# Patient Record
Sex: FEMALE | Race: WHITE | NOT HISPANIC OR LATINO | Employment: OTHER | URBAN - METROPOLITAN AREA
[De-identification: names, ages, dates, MRNs, and addresses within clinical notes are randomized per-mention and may not be internally consistent; named-entity substitution may affect disease eponyms.]

---

## 2017-01-27 ENCOUNTER — TELEPHONE (OUTPATIENT)
Dept: PULMONOLOGY | Facility: HOSPICE | Age: 77
End: 2017-01-27

## 2017-01-27 NOTE — TELEPHONE ENCOUNTER
Pt dropped off her financial assistance forms for Nyla to fill out. Paperwork is on your desk. Last Seen: 11/3/16,   Next Visit: 03/14/17/amrit

## 2017-02-01 NOTE — TELEPHONE ENCOUNTER
Called pt, left VM that Nyla Bhatt has filled out her Paperwork, but that we are unable to send it to [facility] until we get her signature. I explained I would leave her paperwork at the .

## 2017-02-13 ENCOUNTER — TELEPHONE (OUTPATIENT)
Dept: CARDIOLOGY | Facility: MEDICAL CENTER | Age: 77
End: 2017-02-13

## 2017-02-13 DIAGNOSIS — I48.0 PAROXYSMAL ATRIAL FIBRILLATION (HCC): Chronic | ICD-10-CM

## 2017-02-13 DIAGNOSIS — J44.9 CHRONIC OBSTRUCTIVE PULMONARY DISEASE, UNSPECIFIED COPD TYPE (HCC): ICD-10-CM

## 2017-02-14 NOTE — TELEPHONE ENCOUNTER
Spoke with patient who states she is no longer getting Eliquis through the [employer] [facility] as she has to spend so much prior to qualifying.  Samples given with Good [facility] and discount cards for her to check out her options.  She does not want to go back on Warfarin.

## 2017-02-14 NOTE — TELEPHONE ENCOUNTER
----- Message from Donna Coto sent at 2/13/2017  2:32 PM PST -----  Regarding: Patient wants to get samples of Eliquis  MARIE/Nicole    Patient wants to find out if she can get samples of Eliquis and can be reached at [phone].

## 2017-02-15 DIAGNOSIS — J44.9 CHRONIC OBSTRUCTIVE PULMONARY DISEASE, UNSPECIFIED COPD TYPE (HCC): ICD-10-CM

## 2017-02-15 NOTE — TELEPHONE ENCOUNTER
Pt came in said that she called and left a voicemail stating that she need a hand written RX for spiriva to send with the paperwork [facility]. Wants to know if we can mail her it or if she needs to come down and pick it up. Please call Pt at [phone]

## 2017-02-16 RX ORDER — TIOTROPIUM BROMIDE 18 UG/1
CAPSULE ORAL; RESPIRATORY (INHALATION)
Qty: 90 CAP | Refills: 3 | Status: SHIPPED
Start: 2017-02-16 | End: 2018-01-26 | Stop reason: SDUPTHER

## 2017-02-16 RX ORDER — TIOTROPIUM BROMIDE 18 UG/1
18 CAPSULE ORAL; RESPIRATORY (INHALATION) DAILY
Qty: 90 CAP | Refills: 3 | Status: SHIPPED
Start: 2017-02-16 | End: 2017-03-14 | Stop reason: CLARIF

## 2017-02-16 NOTE — TELEPHONE ENCOUNTER
It can be mailed.   Have we ever prescribed this med? Yes.  If yes, what date? 04/28/16    Last OV: 11/3/16    Next OV: 03/14/17    DX: COPD     Medications:   Requested Prescriptions     Pending Prescriptions Disp Refills   • tiotropium (SPIRIVA HANDIHALER) 18 MCG Cap 90 Cap 3     Sig: Inhale 1 Cap by mouth every day.     PLEASE MAIL TO THE PATIENT/ap

## 2017-03-14 ENCOUNTER — OFFICE VISIT (OUTPATIENT)
Dept: PULMONOLOGY | Facility: HOSPICE | Age: 77
End: 2017-03-14
Payer: MEDICARE

## 2017-03-14 VITALS
HEIGHT: 61 IN | DIASTOLIC BLOOD PRESSURE: 80 MMHG | TEMPERATURE: 98.4 F | RESPIRATION RATE: 16 BRPM | WEIGHT: 134 LBS | OXYGEN SATURATION: 93 % | SYSTOLIC BLOOD PRESSURE: 144 MMHG | BODY MASS INDEX: 25.3 KG/M2 | HEART RATE: 91 BPM

## 2017-03-14 DIAGNOSIS — J44.9 CHRONIC OBSTRUCTIVE PULMONARY DISEASE, UNSPECIFIED COPD TYPE (HCC): Chronic | ICD-10-CM

## 2017-03-14 PROCEDURE — 1101F PT FALLS ASSESS-DOCD LE1/YR: CPT | Mod: 8P | Performed by: NURSE PRACTITIONER

## 2017-03-14 PROCEDURE — G8432 DEP SCR NOT DOC, RNG: HCPCS | Performed by: NURSE PRACTITIONER

## 2017-03-14 PROCEDURE — G8484 FLU IMMUNIZE NO ADMIN: HCPCS | Performed by: NURSE PRACTITIONER

## 2017-03-14 PROCEDURE — 1036F TOBACCO NON-USER: CPT | Performed by: NURSE PRACTITIONER

## 2017-03-14 PROCEDURE — 99213 OFFICE O/P EST LOW 20 MIN: CPT | Performed by: NURSE PRACTITIONER

## 2017-03-14 PROCEDURE — 4040F PNEUMOC VAC/ADMIN/RCVD: CPT | Mod: 8P | Performed by: NURSE PRACTITIONER

## 2017-03-14 PROCEDURE — G8420 CALC BMI NORM PARAMETERS: HCPCS | Performed by: NURSE PRACTITIONER

## 2017-03-14 NOTE — PATIENT INSTRUCTIONS
1. Change to Breo 100/25 µg one inhalation daily. Rinse mouth thoroughly after use.  2. Incruse one inhalation daily.  3. Daily exercise encouraged.  4. Pulmonary function tests at next visit.

## 2017-03-14 NOTE — MR AVS SNAPSHOT
"        Rox José Miguel   3/14/2017 10:40 AM   Office Visit   MRN: [id number]    Department:  [facility]   Dept Phone:  [phone]    Description:  Female : 1940   Provider:  FARHAT Spencer           Reason for Visit     COPD 4 Mth follow Up      Allergies as of 3/14/2017     No Known Allergies      You were diagnosed with     Chronic obstructive pulmonary disease, unspecified COPD type (CMS-HCC)   [[id number]]         Vital Signs     Blood Pressure Pulse Temperature Respirations Height Weight    144/80 mmHg 91 36.9 °C (98.4 °F) 16 1.549 m (5' 0.98\") 60.782 kg (134 lb)    Body Mass Index Oxygen Saturation Smoking Status             25.33 kg/m2 93% Former Smoker         Basic Information     Date Of Birth Sex Race Ethnicity Preferred Language    1940 Female White Non- English      Problem List              ICD-10-CM Priority Class Noted - Resolved    Atrial fibrillation (CMS-HCC) (Chronic) I48.91   Unknown - Present    Long term current use of anticoagulant therapy (Chronic) Z79.01   Unknown - Present    COPD (chronic obstructive pulmonary disease) (CMS-HCC) (Chronic) J44.9   Unknown - Present    Cor pulmonale (CMS-HCC) (Chronic) I27.81   Unknown - Present    HTN (hypertension) (Chronic) I10   Unknown - Present    Menopausal and postmenopausal disorder (Chronic) N95.9   Unknown - Present    PVC (premature ventricular contraction) (Chronic) I49.3   Unknown - Present    Hypoxemia R09.02   2016 - Present      Health Maintenance        Date Due Completion Dates    IMM DTaP/Tdap/Td Vaccine (1 - Tdap) 1958 ---    PAP SMEAR 1960 ---    MAMMOGRAM 1979 ---    COLONOSCOPY 1989 ---    IMM ZOSTER VACCINE 1999 ---    BONE DENSITY 2004 ---    IMM PNEUMOCOCCAL 65+ (ADULT) LOW/MEDIUM RISK SERIES (1 of 2 - PCV13) 2004 ---    IMM INFLUENZA (1) 2016 ---            Current Immunizations     No immunizations on file.      Below and/or attached are the " medications your provider expects you to take. Review all of your home medications and newly ordered medications with your provider and/or pharmacist. Follow medication instructions as directed by your provider and/or pharmacist. Please keep your medication list with you and share with your provider. Update the information when medications are discontinued, doses are changed, or new medications (including over-the-counter products) are added; and carry medication information at all times in the event of emergency situations     Allergies:  No Known Allergies          Medications  Valid as of: March 14, 2017 - 11:11 AM    Generic Name Brand Name Tablet Size Instructions for use    Acetaminophen (Tab) TYLENOL 325 MG Take 650 mg by mouth every four hours as needed.        Albuterol Sulfate (Aero Soln) albuterol 108 (90 BASE) MCG/ACT Inhale 2 Puffs by mouth every four hours as needed for Shortness of Breath.        Apixaban (Tab) ELIQUIS 5mg Take 1 Tab by mouth 2 Times a Day.        Ascorbic Acid   Take 1 Tab by mouth every day.          Atenolol (Tab) TENORMIN 50 MG Take 50 mg by mouth every day.        Fluticasone Furoate-Vilanterol (AEROSOL POWDER, BREATH ACTIVATED) Fluticasone Furoate-Vilanterol 100-25 MCG/INH Take 1 Inhalation by mouth every day. Rinse mouth after use.        HydroCHLOROthiazide (Tab) HYDRODIURIL 12.5 MG Take 12.5 mg by mouth every day.        Losartan Potassium (Tab) COZAAR 25 MG Take 25 mg by mouth every day.        Omega-3 Fatty Acids   Take 1 Cap by mouth every day. Pt. Taking 2 times a week        Potassium Chloride (Tab CR) KLOR-CON 10 MEQ Take 10 mEq by mouth every day.          Prenatal Vit-Fe Fumarate-FA   Take  by mouth.        Tiotropium Bromide Monohydrate (Cap) SPIRIVA 18 MCG Use 1 capsule inhaled from handihaler every day.        Umeclidinium Bromide (AEROSOL POWDER, BREATH ACTIVATED) Umeclidinium Bromide 62.5 MCG/INH Inhale 1 Puff by mouth every day.        .                    Medicines prescribed today were sent to:     [id number] - [place] - [address] [place] [address]    Phone: [phone] Fax: [phone]    Open 24 Hours?: No      Medication refill instructions:       If your prescription bottle indicates you have medication refills left, it is not necessary to call your provider’s office. Please contact your pharmacy and they will refill your medication.    If your prescription bottle indicates you do not have any refills left, you may request refills at any time through one of the following ways: The online [employer] system (except Urgent Care), by calling your provider’s office, or by asking your pharmacy to contact your provider’s office with a refill request. Medication refills are processed only during regular business hours and may not be available until the next business day. Your provider may request additional information or to have a follow-up visit with you prior to refilling your medication.   *Please Note: Medication refills are assigned a new Rx number when refilled electronically. Your pharmacy may indicate that no refills were authorized even though a new prescription for the same medication is available at the pharmacy. Please request the medicine by name with the pharmacy before contacting your provider for a refill.        Your To Do List     Future Labs/Procedures Complete By Expires    AMB PULMONARY FUNCTION TEST/LAB  As directed 3/14/2018      Instructions    1. Change to Breo 100/25 µg one inhalation daily. Rinse mouth thoroughly after use.  2. Incruse one inhalation daily.  3. Daily exercise encouraged.  4. Pulmonary function tests at next visit.               [employer] Access Code: [email or web address]  Expires: 4/13/2017 11:11 AM    [employer]  A secure, online tool to manage your health information     [employer]’s [employer]® is a secure, online tool that connects you to your personalized health information from the privacy of your  home -- day or night - making it very easy for you to manage your healthcare. Once the activation process is completed, you can even access your medical information using the [employer] pascale, which is available for free in the Apple Pascale store or Google Play store.     [employer] provides the following levels of access (as shown below):   My Chart Features   [facility] Doctor [facility]  Specialists [facility]  Urgent  Care Non-[facility]  Primary Care  Doctor   Email your healthcare team securely and privately 24/7 X X X    Manage appointments: schedule your next appointment; view details of past/upcoming appointments X      Request prescription refills. X      View recent personal medical records, including lab and immunizations X X X X   View health record, including health history, allergies, medications X X X X   Read reports about your outpatient visits, procedures, consult and ER notes X X X X   See your discharge summary, which is a recap of your hospital and/or ER visit that includes your diagnosis, lab results, and care plan. X X       How to register for [employer]:  1. Go to  [email or web address].  2. Click on the Sign Up Now box, which takes you to the New Member Sign Up page. You will need to provide the following information:  a. Enter your [employer] Access Code exactly as it appears at the top of this page. (You will not need to use this code after you’ve completed the sign-up process. If you do not sign up before the expiration date, you must request a new code.)   b. Enter your date of birth.   c. Enter your home email address.   d. Click Submit, and follow the next screen’s instructions.  3. Create a [employer] ID. This will be your [employer] login ID and cannot be changed, so think of one that is secure and easy to remember.  4. Create a [employer] password. You can change your password at any time.  5. Enter your Password Reset Question and Answer. This can be used at a later time if you forget your password.    6. Enter your e-mail address. This allows you to receive e-mail notifications when new information is available in [employer].  7. Click Sign Up. You can now view your health information.    For assistance activating your [employer] account, call [phone]

## 2017-03-14 NOTE — PROGRESS NOTES
Chief Complaint   Patient presents with   • COPD     4 Mth follow Up         HPI:  This is a 76 y.o. female with a history of chronic obstructive pulmonary disease. Pulmonary function tests from April 2016 indicated FEV1 1.11 L, 62% predicted, FEV1/FVC 64%, and DLCO 111% predicted. She is compliant with Symbicort 160/4.5 µg 2 puffs twice daily and Spiriva daily. The patient reports doing quite well. She has no fevers, chills, sweats, weight loss, or hemoptysis. She has shortness of breath with climbing hills and when bending over. Patient reports that her formulary has changed and Symbicort and Spiriva are going to be $800 a month in total. We will change to Breo and Incruse.    Past Medical History   Diagnosis Date   • Atrial fibrillation (CMS-HCC)    • Long term (current) use of anticoagulants    • COPD (chronic obstructive pulmonary disease) (CMS-HCC)    • Cor pulmonale (CMS-HCC)    • HTN (hypertension)    • Unspecified menopausal and postmenopausal disorder    • PVC (premature ventricular contraction)        Past Surgical History   Procedure Laterality Date   • Cataract extraction with iol     • Cholecystectomy       LAPAROSCOPIC.   • Knee arthroscopy     • Retinal detachment repair         Social History   Substance Use Topics   • Smoking status: Former Smoker -- 1.00 packs/day for 28 years     Types: Cigarettes     Start date: 01/01/1953     Quit date: 01/01/1981   • Smokeless tobacco: Never Used   • Alcohol Use: 3.0 - 6.0 oz/week     5-10 Standard drinks or equivalent per week      Comment: WINE       ROS:   Constitutional: Denies fevers, chills, sweats, fatigue, and weight loss.  Eyes: Denies glasses.  Ears/nose/mouth/throat: Denies injury.  Cardiovascular: Denies chest pain, tightness.  Respiratory: See history of present illness.  GI: Denies heartburn, difficulty swallowing, nausea, and vomiting.  Neurological: Denies frequent headaches, dizziness, weakness.    Vitals:  Filed Vitals:    03/14/17 1008  "  Height: 1.549 m (5' 0.98\")   Weight: 60.782 kg (134 lb)   Weight % change since last entry.: 0 %   BP: 144/80   Pulse: 91   BMI (Calculated): 25.33   Resp: 16   Temp: 36.9 °C (98.4 °F)   O2 sat % room air: 93 %       Allergies:  Review of patient's allergies indicates no known allergies.    Medications:  Current Outpatient Prescriptions   Medication Sig Dispense Refill   • Fluticasone Furoate-Vilanterol (BREO ELLIPTA) 100-25 MCG/INH AEROSOL POWDER, BREATH ACTIVATED Take 1 Inhalation by mouth every day. Rinse mouth after use. 1 Each 5   • Umeclidinium Bromide (INCRUSE ELLIPTA) 62.5 MCG/INH AEROSOL POWDER, BREATH ACTIVATED Inhale 1 Puff by mouth every day. 1 Each 5   • tiotropium (SPIRIVA HANDIHALER) 18 MCG Cap Use 1 capsule inhaled from handihaler every day. 90 Cap 3   • apixaban (ELIQUIS) 5mg Tab Take 1 Tab by mouth 2 Times a Day. 84 Tab 0   • albuterol (VENTOLIN OR PROVENTIL) 108 (90 BASE) MCG/ACT Aero Soln inhalation aerosol Inhale 2 Puffs by mouth every four hours as needed for Shortness of Breath.     • hydrochlorothiazide (HYDRODIURIL) 12.5 MG tablet Take 12.5 mg by mouth every day.     • losartan (COZAAR) 25 MG Tab Take 25 mg by mouth every day.     • atenolol (TENORMIN) 50 MG Tab Take 50 mg by mouth every day.     • acetaminophen (TYLENOL) 325 MG TABS Take 650 mg by mouth every four hours as needed.     • Prenatal Vit-Fe Fumarate-FA (PRENATAL ONE DAILY PO) Take  by mouth.     • Omega-3 Fatty Acids (FISH OIL PO) Take 1 Cap by mouth every day. Pt. Taking 2 times a week     • potassium chloride CR (K-DUR) 10 MEQ tablet Take 10 mEq by mouth every day.       • Ascorbic Acid (VITAMIN C PO) Take 1 Tab by mouth every day.         No current facility-administered medications for this visit.       PHYSICAL EXAM:  Appearance: Well-developed, well-nourished, no acute distress.  Eyes. PERRL.  Hearing: Grossly intact.  Oropharynx: Tongue normal, posterior pharynx without erythema or exudate.  Respiratory effort: No " intercostal retractions or use of accessory muscles.  Lung auscultation: No crackles, wheezing.  Heart auscultation: No murmur, gallop, or rub. Regular rate and rhythm.  Extremities: No cyanosis or edema.  Gait and Station: Normal  Orientation: Oriented to time, place, and person.    Assessment:  1. Chronic obstructive pulmonary disease, unspecified COPD type (CMS-HCC)  AMB PULMONARY FUNCTION TEST/LAB    Fluticasone Furoate-Vilanterol (BREO ELLIPTA) 100-25 MCG/INH AEROSOL POWDER, BREATH ACTIVATED    Umeclidinium Bromide (INCRUSE ELLIPTA) 62.5 MCG/INH AEROSOL POWDER, BREATH ACTIVATED         Plan:  1. Change to Breo 100/25 µg one inhalation daily. Rinse mouth thoroughly after use.  2. Incruse one inhalation daily.  3. Daily exercise encouraged.  4. Pulmonary function tests at next visit.     Return in about 3 months (around 6/14/2017) for With results, With AWA Aguilar.

## 2017-04-25 ENCOUNTER — TELEPHONE (OUTPATIENT)
Dept: VASCULAR LAB | Facility: MEDICAL CENTER | Age: 77
End: 2017-04-25

## 2017-04-25 ENCOUNTER — TELEPHONE (OUTPATIENT)
Dept: CARDIOLOGY | Facility: MEDICAL CENTER | Age: 77
End: 2017-04-25

## 2017-04-25 DIAGNOSIS — I48.0 PAROXYSMAL ATRIAL FIBRILLATION (HCC): Chronic | ICD-10-CM

## 2017-04-25 DIAGNOSIS — I48.91 ATRIAL FIBRILLATION, UNSPECIFIED TYPE (HCC): ICD-10-CM

## 2017-04-25 NOTE — TELEPHONE ENCOUNTER
"----- Message from Nicole Pritchett L.P.N. sent at 4/25/2017  4:36 PM PDT -----  Regarding: FW: eliquis samples for   Contact: [phone]  Spoke with patient who still has not decided what she is going to do in regards to Eliquis.  She will again check with her Insurance regarding the cost.  She does not want to \"give up\" on Eliquis.    ----- Message -----     From: Nicole Pritchett L.P.N.     Sent: 4/25/2017   2:59 PM       To: Nicole Pritchett L.P.N.  Subject: FW: eliquis samples for                   Pt left message to call.   ----- Message -----     From: Brooklyn Gallegos     Sent: 4/25/2017  12:33 PM       To: Nicole Pritchett L.P.N.  Subject: eliquis samples for                           MARIE/nicole  Pt calling about eliquis samples you were going to get for her. Please call pt to arrange for her to , [phone].      "

## 2017-05-11 ENCOUNTER — HOSPITAL ENCOUNTER (OUTPATIENT)
Dept: LAB | Facility: MEDICAL CENTER | Age: 77
End: 2017-05-11
Attending: NURSE PRACTITIONER
Payer: MEDICARE

## 2017-05-11 DIAGNOSIS — I48.91 ATRIAL FIBRILLATION, UNSPECIFIED TYPE (HCC): ICD-10-CM

## 2017-05-11 LAB
ANION GAP SERPL CALC-SCNC: 9 MMOL/L (ref 0–11.9)
BUN SERPL-MCNC: 8 MG/DL (ref 8–22)
CALCIUM SERPL-MCNC: 9.5 MG/DL (ref 8.5–10.5)
CHLORIDE SERPL-SCNC: 96 MMOL/L (ref 96–112)
CO2 SERPL-SCNC: 32 MMOL/L (ref 20–33)
CREAT SERPL-MCNC: 0.9 MG/DL (ref 0.5–1.4)
ERYTHROCYTE [DISTWIDTH] IN BLOOD BY AUTOMATED COUNT: 49.1 FL (ref 35.9–50)
GFR SERPL CREATININE-BSD FRML MDRD: >60 ML/MIN/1.73 M 2
GLUCOSE SERPL-MCNC: 119 MG/DL (ref 65–99)
HCT VFR BLD AUTO: 44.2 % (ref 37–47)
HGB BLD-MCNC: 14.7 G/DL (ref 12–16)
MCH RBC QN AUTO: 34.1 PG (ref 27–33)
MCHC RBC AUTO-ENTMCNC: 33.3 G/DL (ref 33.6–35)
MCV RBC AUTO: 102.6 FL (ref 81.4–97.8)
PLATELET # BLD AUTO: 127 K/UL (ref 164–446)
PMV BLD AUTO: 10.6 FL (ref 9–12.9)
POTASSIUM SERPL-SCNC: 3.2 MMOL/L (ref 3.6–5.5)
RBC # BLD AUTO: 4.31 M/UL (ref 4.2–5.4)
SODIUM SERPL-SCNC: 137 MMOL/L (ref 135–145)
WBC # BLD AUTO: 3.8 K/UL (ref 4.8–10.8)

## 2017-05-11 PROCEDURE — 80048 BASIC METABOLIC PNL TOTAL CA: CPT

## 2017-05-11 PROCEDURE — 36415 COLL VENOUS BLD VENIPUNCTURE: CPT

## 2017-05-11 PROCEDURE — 85027 COMPLETE CBC AUTOMATED: CPT

## 2017-06-30 ENCOUNTER — TELEPHONE (OUTPATIENT)
Dept: CARDIOLOGY | Facility: MEDICAL CENTER | Age: 77
End: 2017-06-30

## 2017-06-30 NOTE — TELEPHONE ENCOUNTER
returning your call  Received: Today       JOHN Fortune       Patient is returning your call. She can be reached at [phone].              Returned patient call. Pt states she has company at this time and is leaving town next week and doesn't want to run out. We discuss patient assistance and the company co-pay card. Will place on in mail for patient today and patient will attempt to fill at pharmacy next week and call if she has any troubles.

## 2017-06-30 NOTE — TELEPHONE ENCOUNTER
out of Eliquis samples  Received: Today       JOHN Fortune/Lin       Patient is out of her Eliquis samples. She would like a call back at [phone].         Returned patient call. No answer, LVM with request for call back

## 2017-07-26 DIAGNOSIS — J44.9 CHRONIC OBSTRUCTIVE PULMONARY DISEASE, UNSPECIFIED COPD TYPE (HCC): ICD-10-CM

## 2017-07-26 RX ORDER — BUDESONIDE AND FORMOTEROL FUMARATE DIHYDRATE 160; 4.5 UG/1; UG/1
2 AEROSOL RESPIRATORY (INHALATION) 2 TIMES DAILY
Qty: 1 INHALER | Refills: 11 | Status: SHIPPED
Start: 2017-07-26 | End: 2017-08-08 | Stop reason: SDUPTHER

## 2017-07-26 RX ORDER — BUDESONIDE AND FORMOTEROL FUMARATE DIHYDRATE 160; 4.5 UG/1; UG/1
2 AEROSOL RESPIRATORY (INHALATION) 2 TIMES DAILY
COMMUNITY
End: 2017-07-26 | Stop reason: SDUPTHER

## 2017-07-26 NOTE — TELEPHONE ENCOUNTER
Pt called requesting an rx for Symbicort 160, states that she has a free 1 yr Symbicort card that she would like to use, states that she would prefer to hand carry the to a pharmacy just incase they don't honor the free 1 yr card. Pt also states that her  just got out of hospital and is unable to  the rx from office.    Last OV: 3/14/17  Next OV: 3 month return- transferred pt to scheduling to make pft appt and next ov.  COPD

## 2017-07-28 ENCOUNTER — HOSPITAL ENCOUNTER (OUTPATIENT)
Dept: LAB | Facility: MEDICAL CENTER | Age: 77
End: 2017-07-28
Attending: FAMILY MEDICINE
Payer: MEDICARE

## 2017-07-28 LAB
ALBUMIN SERPL BCP-MCNC: 3.5 G/DL (ref 3.2–4.9)
ALBUMIN/GLOB SERPL: 1 G/DL
ALP SERPL-CCNC: 88 U/L (ref 30–99)
ALT SERPL-CCNC: 25 U/L (ref 2–50)
ANION GAP SERPL CALC-SCNC: 14 MMOL/L (ref 0–11.9)
AST SERPL-CCNC: 58 U/L (ref 12–45)
BASOPHILS # BLD AUTO: 0.5 % (ref 0–1.8)
BASOPHILS # BLD: 0.03 K/UL (ref 0–0.12)
BILIRUB CONJ SERPL-MCNC: 0.5 MG/DL (ref 0.1–0.5)
BILIRUB INDIRECT SERPL-MCNC: 1.7 MG/DL (ref 0–1)
BILIRUB SERPL-MCNC: 2.2 MG/DL (ref 0.1–1.5)
BUN SERPL-MCNC: 7 MG/DL (ref 8–22)
CALCIUM SERPL-MCNC: 9.6 MG/DL (ref 8.5–10.5)
CHLORIDE SERPL-SCNC: 88 MMOL/L (ref 96–112)
CHOLEST SERPL-MCNC: 189 MG/DL (ref 100–199)
CO2 SERPL-SCNC: 31 MMOL/L (ref 20–33)
CREAT SERPL-MCNC: 0.64 MG/DL (ref 0.5–1.4)
EOSINOPHIL # BLD AUTO: 0.02 K/UL (ref 0–0.51)
EOSINOPHIL NFR BLD: 0.3 % (ref 0–6.9)
ERYTHROCYTE [DISTWIDTH] IN BLOOD BY AUTOMATED COUNT: 56.8 FL (ref 35.9–50)
EST. AVERAGE GLUCOSE BLD GHB EST-MCNC: 97 MG/DL
FERRITIN SERPL-MCNC: 142.4 NG/ML (ref 10–291)
FOLATE SERPL-MCNC: 4.5 NG/ML
GFR SERPL CREATININE-BSD FRML MDRD: >60 ML/MIN/1.73 M 2
GLOBULIN SER CALC-MCNC: 3.5 G/DL (ref 1.9–3.5)
GLUCOSE SERPL-MCNC: 84 MG/DL (ref 65–99)
HBA1C MFR BLD: 5 % (ref 0–5.6)
HCT VFR BLD AUTO: 41.6 % (ref 37–47)
HDLC SERPL-MCNC: 67 MG/DL
HGB BLD-MCNC: 14.2 G/DL (ref 12–16)
IMM GRANULOCYTES # BLD AUTO: 0.02 K/UL (ref 0–0.11)
IMM GRANULOCYTES NFR BLD AUTO: 0.3 % (ref 0–0.9)
IRON SATN MFR SERPL: 30 % (ref 15–55)
IRON SERPL-MCNC: 93 UG/DL (ref 40–170)
LDLC SERPL CALC-MCNC: 107 MG/DL
LYMPHOCYTES # BLD AUTO: 0.76 K/UL (ref 1–4.8)
LYMPHOCYTES NFR BLD: 13.1 % (ref 22–41)
MAGNESIUM SERPL-MCNC: 1.1 MG/DL (ref 1.5–2.5)
MCH RBC QN AUTO: 34.3 PG (ref 27–33)
MCHC RBC AUTO-ENTMCNC: 34.1 G/DL (ref 33.6–35)
MCV RBC AUTO: 100.5 FL (ref 81.4–97.8)
MONOCYTES # BLD AUTO: 0.53 K/UL (ref 0–0.85)
MONOCYTES NFR BLD AUTO: 9.1 % (ref 0–13.4)
NEUTROPHILS # BLD AUTO: 4.46 K/UL (ref 2–7.15)
NEUTROPHILS NFR BLD: 76.7 % (ref 44–72)
NRBC # BLD AUTO: 0 K/UL
NRBC BLD AUTO-RTO: 0 /100 WBC
PLATELET # BLD AUTO: 143 K/UL (ref 164–446)
PMV BLD AUTO: 11.3 FL (ref 9–12.9)
POTASSIUM SERPL-SCNC: 2.8 MMOL/L (ref 3.6–5.5)
PROT SERPL-MCNC: 7 G/DL (ref 6–8.2)
RBC # BLD AUTO: 4.14 M/UL (ref 4.2–5.4)
SODIUM SERPL-SCNC: 133 MMOL/L (ref 135–145)
TIBC SERPL-MCNC: 307 UG/DL (ref 250–450)
TRIGL SERPL-MCNC: 75 MG/DL (ref 0–149)
VIT B12 SERPL-MCNC: 733 PG/ML (ref 211–911)
WBC # BLD AUTO: 5.8 K/UL (ref 4.8–10.8)

## 2017-07-28 PROCEDURE — 80061 LIPID PANEL: CPT

## 2017-07-28 PROCEDURE — 83540 ASSAY OF IRON: CPT

## 2017-07-28 PROCEDURE — 36415 COLL VENOUS BLD VENIPUNCTURE: CPT

## 2017-07-28 PROCEDURE — 85025 COMPLETE CBC W/AUTO DIFF WBC: CPT

## 2017-07-28 PROCEDURE — 82248 BILIRUBIN DIRECT: CPT

## 2017-07-28 PROCEDURE — 82728 ASSAY OF FERRITIN: CPT

## 2017-07-28 PROCEDURE — 83550 IRON BINDING TEST: CPT

## 2017-07-28 PROCEDURE — 80053 COMPREHEN METABOLIC PANEL: CPT

## 2017-07-28 PROCEDURE — 83735 ASSAY OF MAGNESIUM: CPT

## 2017-07-28 PROCEDURE — 82746 ASSAY OF FOLIC ACID SERUM: CPT

## 2017-07-28 PROCEDURE — 83036 HEMOGLOBIN GLYCOSYLATED A1C: CPT

## 2017-07-28 PROCEDURE — 82607 VITAMIN B-12: CPT

## 2017-08-01 ENCOUNTER — HOSPITAL ENCOUNTER (EMERGENCY)
Facility: MEDICAL CENTER | Age: 77
End: 2017-08-01
Attending: EMERGENCY MEDICINE
Payer: MEDICARE

## 2017-08-01 VITALS
SYSTOLIC BLOOD PRESSURE: 159 MMHG | DIASTOLIC BLOOD PRESSURE: 96 MMHG | WEIGHT: 131.39 LBS | HEART RATE: 89 BPM | RESPIRATION RATE: 16 BRPM | OXYGEN SATURATION: 90 % | HEIGHT: 62 IN | TEMPERATURE: 97.6 F | BODY MASS INDEX: 24.18 KG/M2

## 2017-08-01 DIAGNOSIS — E87.1 HYPONATREMIA: ICD-10-CM

## 2017-08-01 DIAGNOSIS — E87.6 HYPOKALEMIA: ICD-10-CM

## 2017-08-01 LAB
ALBUMIN SERPL BCP-MCNC: 3.1 G/DL (ref 3.2–4.9)
ALBUMIN/GLOB SERPL: 1.1 G/DL
ALP SERPL-CCNC: 73 U/L (ref 30–99)
ALT SERPL-CCNC: 24 U/L (ref 2–50)
ANION GAP SERPL CALC-SCNC: 10 MMOL/L (ref 0–11.9)
AST SERPL-CCNC: 56 U/L (ref 12–45)
BASOPHILS # BLD AUTO: 0.8 % (ref 0–1.8)
BASOPHILS # BLD: 0.03 K/UL (ref 0–0.12)
BILIRUB SERPL-MCNC: 2.7 MG/DL (ref 0.1–1.5)
BUN SERPL-MCNC: 12 MG/DL (ref 8–22)
CALCIUM SERPL-MCNC: 9.3 MG/DL (ref 8.5–10.5)
CHLORIDE SERPL-SCNC: 92 MMOL/L (ref 96–112)
CO2 SERPL-SCNC: 31 MMOL/L (ref 20–33)
CREAT SERPL-MCNC: 0.83 MG/DL (ref 0.5–1.4)
EOSINOPHIL # BLD AUTO: 0.06 K/UL (ref 0–0.51)
EOSINOPHIL NFR BLD: 1.6 % (ref 0–6.9)
ERYTHROCYTE [DISTWIDTH] IN BLOOD BY AUTOMATED COUNT: 57.3 FL (ref 35.9–50)
GFR SERPL CREATININE-BSD FRML MDRD: >60 ML/MIN/1.73 M 2
GLOBULIN SER CALC-MCNC: 2.9 G/DL (ref 1.9–3.5)
GLUCOSE SERPL-MCNC: 95 MG/DL (ref 65–99)
HCT VFR BLD AUTO: 41.1 % (ref 37–47)
HGB BLD-MCNC: 14.4 G/DL (ref 12–16)
IMM GRANULOCYTES # BLD AUTO: 0.04 K/UL (ref 0–0.11)
IMM GRANULOCYTES NFR BLD AUTO: 1 % (ref 0–0.9)
INR PPP: 2.02 (ref 0.87–1.13)
LIPASE SERPL-CCNC: 29 U/L (ref 11–82)
LYMPHOCYTES # BLD AUTO: 0.66 K/UL (ref 1–4.8)
LYMPHOCYTES NFR BLD: 17.1 % (ref 22–41)
MCH RBC QN AUTO: 35.3 PG (ref 27–33)
MCHC RBC AUTO-ENTMCNC: 35 G/DL (ref 33.6–35)
MCV RBC AUTO: 100.7 FL (ref 81.4–97.8)
MONOCYTES # BLD AUTO: 0.75 K/UL (ref 0–0.85)
MONOCYTES NFR BLD AUTO: 19.4 % (ref 0–13.4)
NEUTROPHILS # BLD AUTO: 2.32 K/UL (ref 2–7.15)
NEUTROPHILS NFR BLD: 60.1 % (ref 44–72)
NRBC # BLD AUTO: 0 K/UL
NRBC BLD AUTO-RTO: 0 /100 WBC
PLATELET # BLD AUTO: 105 K/UL (ref 164–446)
PMV BLD AUTO: 9.8 FL (ref 9–12.9)
POTASSIUM SERPL-SCNC: 2.8 MMOL/L (ref 3.6–5.5)
PROT SERPL-MCNC: 6 G/DL (ref 6–8.2)
PROTHROMBIN TIME: 23.5 SEC (ref 12–14.6)
RBC # BLD AUTO: 4.08 M/UL (ref 4.2–5.4)
SODIUM SERPL-SCNC: 133 MMOL/L (ref 135–145)
WBC # BLD AUTO: 3.9 K/UL (ref 4.8–10.8)

## 2017-08-01 PROCEDURE — 700105 HCHG RX REV CODE 258: Performed by: EMERGENCY MEDICINE

## 2017-08-01 PROCEDURE — 99284 EMERGENCY DEPT VISIT MOD MDM: CPT

## 2017-08-01 PROCEDURE — 85025 COMPLETE CBC W/AUTO DIFF WBC: CPT

## 2017-08-01 PROCEDURE — 83690 ASSAY OF LIPASE: CPT

## 2017-08-01 PROCEDURE — 700102 HCHG RX REV CODE 250 W/ 637 OVERRIDE(OP): Performed by: EMERGENCY MEDICINE

## 2017-08-01 PROCEDURE — A9270 NON-COVERED ITEM OR SERVICE: HCPCS | Performed by: EMERGENCY MEDICINE

## 2017-08-01 PROCEDURE — 85610 PROTHROMBIN TIME: CPT

## 2017-08-01 PROCEDURE — 80053 COMPREHEN METABOLIC PANEL: CPT

## 2017-08-01 RX ORDER — POTASSIUM CHLORIDE 20 MEQ/1
20 TABLET, EXTENDED RELEASE ORAL 2 TIMES DAILY
Qty: 30 TAB | Refills: 0 | Status: SHIPPED | OUTPATIENT
Start: 2017-08-01 | End: 2017-09-11

## 2017-08-01 RX ORDER — SODIUM CHLORIDE 9 MG/ML
INJECTION, SOLUTION INTRAVENOUS CONTINUOUS
Status: DISCONTINUED | OUTPATIENT
Start: 2017-08-01 | End: 2017-08-01 | Stop reason: HOSPADM

## 2017-08-01 RX ORDER — POTASSIUM CHLORIDE 20 MEQ/1
20 TABLET, EXTENDED RELEASE ORAL 2 TIMES DAILY
Qty: 30 TAB | Refills: 0 | Status: SHIPPED | OUTPATIENT
Start: 2017-08-01 | End: 2017-08-01

## 2017-08-01 RX ORDER — POTASSIUM CHLORIDE 20 MEQ/1
40 TABLET, EXTENDED RELEASE ORAL ONCE
Status: COMPLETED | OUTPATIENT
Start: 2017-08-01 | End: 2017-08-01

## 2017-08-01 RX ADMIN — POTASSIUM CHLORIDE 40 MEQ: 1500 TABLET, EXTENDED RELEASE ORAL at 13:43

## 2017-08-01 RX ADMIN — POTASSIUM CHLORIDE 40 MEQ: 1500 TABLET, EXTENDED RELEASE ORAL at 11:33

## 2017-08-01 RX ADMIN — SODIUM CHLORIDE: 9 INJECTION, SOLUTION INTRAVENOUS at 11:42

## 2017-08-01 ASSESSMENT — PAIN SCALES - GENERAL
PAINLEVEL_OUTOF10: 0

## 2017-08-01 NOTE — ED PROVIDER NOTES
ED Provider Note    CHIEF COMPLAINT  Chief Complaint   Patient presents with   • Abnormal Labs       HPI  Rox Valdez is a 76 y.o. female who presents from home for evaluation of a low potassium and low sodium. That she feels fine. She is asymptomatic. No dizziness no weakness. No vomiting or diarrhea. No headache, no chest pain, no difficulty breathing. No paresthesias. Says she feels great that she saw her family physician 3 days ago they did some lab on her and she was told to come into the ED. Her medications include Symbicort, Spiriva, hydrochlorothiazide, Cozaar, Tenormin.    REVIEW OF SYSTEMS  No headache, no jaw pain, no chest pain, no difficulty breathing.  ALL OTHER SYSTEMS NEGATIVE    ALLERGIES  No Known Allergies    CURRENT MEDICATIONS  Symbicort, Spiriva, Ventolin, Cozaar, atenolol, eliquis, hydrochlorothiazide.    PAST MEDICAL HISTORY  Past Medical History   Diagnosis Date   • Atrial fibrillation (CMS-HCC)    • Long term (current) use of anticoagulants    • COPD (chronic obstructive pulmonary disease) (CMS-HCC)    • Cor pulmonale (CMS-HCC)    • HTN (hypertension)    • Unspecified menopausal and postmenopausal disorder    • PVC (premature ventricular contraction)        SURGICAL HISTORY  Past Surgical History   Procedure Laterality Date   • Cataract extraction with iol     • Cholecystectomy       LAPAROSCOPIC.   • Knee arthroscopy     • Retinal detachment repair         FAMILY HISTORY  Family History   Problem Relation Age of Onset   • Hypertension Mother    • Heart Disease Brother      Atrial Fibrillation.       SOCIAL HISTORY  Social History     Social History   • Marital Status:      Spouse Name: N/A   • Number of Children: N/A   • Years of Education: N/A     Social History Main Topics   • Smoking status: Former Smoker -- 1.00 packs/day for 28 years     Types: Cigarettes     Start date: 01/01/1953     Quit date: 01/01/1981   • Smokeless tobacco: Never Used   • Alcohol Use: 3.0 - 6.0 oz/week  "    5-10 Standard drinks or equivalent per week      Comment: WINE   • Drug Use: Not on file   • Sexual Activity: Not on file     Other Topics Concern   • Not on file     Social History Narrative       PHYSICAL EXAM  GENERAL: Alert female adult  VITAL SIGNS: /96 mmHg  Pulse 85  Temp(Src) 36.4 °C (97.6 °F)  Resp 16  Ht 1.575 m (5' 2\")  Wt 59.6 kg (131 lb 6.3 oz)  BMI 24.03 kg/m2  SpO2 92%  Constitutional: Alert healthy-appearing adult   HENT: Scalp is normal size and nontender. Ears are clear. Nose is clear. Throat is clear with no stridor no drooling no trismus. Teeth are all intact.  Eyes: Pupils equal round and reactive to light, extraocular motor fall. There is no scleral icterus.  Neck: Neck is supple and nontender. There is no meningismus. No adenitis. No thyromegaly.  Lymphatic: No adenopathy.   Cardiovascular: Heart regular rhythm without murmurs or gallops   Thorax & Lungs: No chest wall tenderness. Lungs are clear. Patient has good breath sounds bilateral. No rales, no rhonchi, no wheezes.  Abdomen: Abdomen is soft, nontender, not rigid, no guarding, and no organomegaly. There is no palpable hernia   Skin: Warm, pink, and dry with no erythema and no rash.   Back: Nontender, no midline bony tenderness, no flank tenderness.                                              Extremities: Full range of motion  No tenderness to palpation and no deformities noted. No calf or thigh swelling. No calf or thigh tenderness. No clinical DVT.  Neurologic: Alert & oriented . Cranial nerves are grossly intact as tested. Patient moves all 4 extremities well. Patient has good strong flexion and extension of the ankle joints knee joints hip joints and elbow joints. Sensation is normal and symmetrical in the upper and lower extremities.   Psychiatric: Patient is alert oriented coherent and rational.       COURSE & MEDICAL DECISION MAKING  Patient was sent in by her family physician because she has a low serum sodium " and potassium that is picked up on lab a couple days ago. She is otherwise asymptomatic. No weakness, no paresthesias, no vomiting or diarrhea. She is asymptomatic. Hypokalemia may be related to her hydrochlorothiazide without potassium replacement.    Plan: #1. IV normal saline. Number 2K Dur 40 mEq by mouth #3 for repeat lab. In the ED number for review previous medical records    Review previous medical records: A. fib, COPD, medications include hydrochlorothiazide, Cozaar, Tenormin, Symbicort.    Laboratory and reexamination: INR is 2.0. Calcium was minimally low at 2.8. Lipase 29. Sodium 133. SGOT is 56. She certainly stable for discharge. Been given a 2nd dose of K dur   Results for orders placed or performed during the hospital encounter of 08/01/17   CBC WITH DIFFERENTIAL   Result Value Ref Range    WBC 3.9 (L) 4.8 - 10.8 K/uL    RBC 4.08 (L) 4.20 - 5.40 M/uL    Hemoglobin 14.4 12.0 - 16.0 g/dL    Hematocrit 41.1 37.0 - 47.0 %    .7 (H) 81.4 - 97.8 fL    MCH 35.3 (H) 27.0 - 33.0 pg    MCHC 35.0 33.6 - 35.0 g/dL    RDW 57.3 (H) 35.9 - 50.0 fL    Platelet Count 105 (L) 164 - 446 K/uL    MPV 9.8 9.0 - 12.9 fL    Neutrophils-Polys 60.10 44.00 - 72.00 %    Lymphocytes 17.10 (L) 22.00 - 41.00 %    Monocytes 19.40 (H) 0.00 - 13.40 %    Eosinophils 1.60 0.00 - 6.90 %    Basophils 0.80 0.00 - 1.80 %    Immature Granulocytes 1.00 (H) 0.00 - 0.90 %    Nucleated RBC 0.00 /100 WBC    Neutrophils (Absolute) 2.32 2.00 - 7.15 K/uL    Lymphs (Absolute) 0.66 (L) 1.00 - 4.80 K/uL    Monos (Absolute) 0.75 0.00 - 0.85 K/uL    Eos (Absolute) 0.06 0.00 - 0.51 K/uL    Baso (Absolute) 0.03 0.00 - 0.12 K/uL    Immature Granulocytes (abs) 0.04 0.00 - 0.11 K/uL    NRBC (Absolute) 0.00 K/uL   COMP METABOLIC PANEL   Result Value Ref Range    Sodium 133 (L) 135 - 145 mmol/L    Potassium 2.8 (L) 3.6 - 5.5 mmol/L    Chloride 92 (L) 96 - 112 mmol/L    Co2 31 20 - 33 mmol/L    Anion Gap 10.0 0.0 - 11.9    Glucose 95 65 - 99 mg/dL     Bun 12 8 - 22 mg/dL    Creatinine 0.83 0.50 - 1.40 mg/dL    Calcium 9.3 8.5 - 10.5 mg/dL    AST(SGOT) 56 (H) 12 - 45 U/L    ALT(SGPT) 24 2 - 50 U/L    Alkaline Phosphatase 73 30 - 99 U/L    Total Bilirubin 2.7 (H) 0.1 - 1.5 mg/dL    Albumin 3.1 (L) 3.2 - 4.9 g/dL    Total Protein 6.0 6.0 - 8.2 g/dL    Globulin 2.9 1.9 - 3.5 g/dL    A-G Ratio 1.1 g/dL   LIPASE   Result Value Ref Range    Lipase 29 11 - 82 U/L   PROTHROMBIN TIME   Result Value Ref Range    PT 23.5 (H) 12.0 - 14.6 sec    INR 2.02 (H) 0.87 - 1.13   ESTIMATED GFR   Result Value Ref Range    GFR If African American >60 >60 mL/min/1.73 m 2    GFR If Non African American >60 >60 mL/min/1.73 m 2      Home treatment: #1 prescription in for K Dur. #2 should be given a copy of all of her reports #3 she can follow-up with her family physician in the office. Stable on discharge. She is asymptomatic at this time. Vital signs normal.  FINAL IMPRESSION  1. Hypokalemia mild  2. Hyponatremia mild         Electronically signed by: Gary Gansert, 8/1/2017 /1:30 PM

## 2017-08-01 NOTE — ED NOTES
RN Break note - All discharge instructions given to pt as well as Rx for KCL. Pt verbalized understanding of all discharge instructions. All lines removed prior to discharge. All questions answered.

## 2017-08-01 NOTE — ED AVS SNAPSHOT
8/1/2017    Rox Valdez  [address] [place]  [address]   [place] [address]    Dear Rox:    [facility] wants to ensure your discharge home is safe and you or your loved ones have had all of your questions answered regarding your care after you leave the hospital.    Below is a list of resources and contact information should you have any questions regarding your hospital stay, follow-up instructions, or active medical symptoms.    Questions or Concerns Regarding… Contact   Medical Questions Related to Your Discharge  (7 days a week, 8am-5pm) Contact a Nurse Care Coordinator   [phone]   Medical Questions Not Related to Your Discharge  (24 hours a day / 7 days a week)  Contact the Nurse Health Line   [phone]    Medications or Discharge Instructions Refer to your discharge packet   or contact your [facility] Primary Care Provider   [phone]   Follow-up Appointment(s) Schedule your appointment via [employer]   or contact Scheduling [phone]   Billing Review your statement via [employer]  or contact Billing [phone]   Medical Records Review your records via [employer]   or contact Medical Records [phone]     You may receive a telephone call within two days of discharge. This call is to make certain you understand your discharge instructions and have the opportunity to have any questions answered. You can also easily access your medical information, test results and upcoming appointments via the [employer] free online health management tool. You can learn more and sign up at [email or web address]. For assistance setting up your [employer] account, please call [phone].    Once again, we want to ensure your discharge home is safe and that you have a clear understanding of any next steps in your care. If you have any questions or concerns, please do not hesitate to contact us, we are here for you. Thank you for choosing [facility] for your healthcare needs.    Sincerely,    Your [facility] Healthcare Team

## 2017-08-01 NOTE — ED AVS SNAPSHOT
Home Care Instructions                                                                                                                Rox Valdez   MRN: [id number]    Department:  [facility], Emergency Dept   Date of Visit:  8/1/2017            [facility], Emergency Dept    [address]    [place] [address]    Phone:  [phone]      You were seen by     Gary Gansert, M.D.      Your Diagnosis Was     Hypokalemia     E87.6       These are the medications you received during your hospitalization from 08/01/2017 1015 to 08/01/2017 1347     Date/Time Order Dose Route Action    08/01/2017 1142 NS infusion   Intravenous New Bag    08/01/2017 1133 potassium chloride SA (Kdur) tablet 40 mEq 40 mEq Oral Given    08/01/2017 1343 potassium chloride SA (Kdur) tablet 40 mEq 40 mEq Oral Given      Follow-up Information     1. Follow up with Pastor Lopez M.D.. Schedule an appointment as soon as possible for a visit in 1 day.    Specialty:  Family Medicine    Contact information    [address]  [address]  [place] [address]  [phone]        Medication Information     Review all of your home medications and newly ordered medications with your primary doctor and/or pharmacist as soon as possible. Follow medication instructions as directed by your doctor and/or pharmacist.     Please keep your complete medication list with you and share with your physician. Update the information when medications are discontinued, doses are changed, or new medications (including over-the-counter products) are added; and carry medication information at all times in the event of emergency situations.               Medication List      START taking these medications        Instructions    Morning Afternoon Evening Bedtime    potassium chloride SA 20 MEQ Tbcr   Last time this was given:  40 mEq on 8/1/2017  1:43 PM   Commonly known as:  Kdur        Take 1 Tab by mouth 2 times a day.   Dose:  20 mEq                           ASK your doctor about these medications        Instructions    Morning Afternoon Evening Bedtime    acetaminophen 325 MG Tabs   Commonly known as:  TYLENOL        Take 650 mg by mouth every four hours as needed.   Dose:  650 mg                        albuterol 108 (90 BASE) MCG/ACT Aers inhalation aerosol        Inhale 2 Puffs by mouth every four hours as needed for Shortness of Breath.   Dose:  2 Puff                        apixaban 5mg Tabs   Commonly known as:  ELIQUIS        Doctor's comments:  Pt no longer getting through Pt assistance Program   Take 1 Tab by mouth 2 Times a Day.   Dose:  5 mg                        atenolol 50 MG Tabs   Commonly known as:  TENORMIN        Take 50 mg by mouth every day.   Dose:  50 mg                        budesonide-formoterol 160-4.5 MCG/ACT Aero   Commonly known as:  SYMBICORT        Inhale 2 Puffs by mouth 2 Times a Day.   Dose:  2 Puff                        FISH OIL PO        Take 1 Cap by mouth every day. Pt. Taking 2 times a week   Dose:  1 Cap                        hydrochlorothiazide 12.5 MG tablet   Commonly known as:  HYDRODIURIL        Take 12.5 mg by mouth every day.   Dose:  12.5 mg                        losartan 25 MG Tabs   Commonly known as:  COZAAR        Take 25 mg by mouth every day.   Dose:  25 mg                        potassium chloride ER 10 MEQ tablet   Commonly known as:  KLOR-CON        Take 10 mEq by mouth every day.   Dose:  10 mEq                        PRENATAL ONE DAILY PO        Take  by mouth.                        tiotropium 18 MCG Caps   Commonly known as:  SPIRIVA HANDIHALER        Use 1 capsule inhaled from handihaler every day.                        Umeclidinium Bromide 62.5 MCG/INH Aepb   Commonly known as:  INCRUSE ELLIPTA        Inhale 1 Puff by mouth every day.   Dose:  1 Puff                        VITAMIN C PO        Take 1 Tab by mouth every day.   Dose:  1 Tab                             Where to Get  Your Medications      You can get these medications from any pharmacy     Bring a paper prescription for each of these medications    - potassium chloride SA 20 MEQ Tbcr            Procedures and tests performed during your visit     CBC WITH DIFFERENTIAL    COMP METABOLIC PANEL    ESTIMATED GFR    IV Saline Lock    LIPASE    PROTHROMBIN TIME        Discharge Instructions       Hypokalemia  Hypokalemia means that the amount of potassium in the blood is lower than normal. Potassium is a chemical, called an electrolyte, that helps regulate the amount of fluid in the body. It also stimulates muscle contraction and helps nerves function properly. Most of the body's potassium is inside of cells, and only a very small amount is in the blood. Because the amount in the blood is so small, minor changes can be life-threatening.  CAUSES  · Antibiotics.  · Diarrhea or vomiting.  · Using laxatives too much, which can cause diarrhea.  · Chronic kidney disease.  · Water pills (diuretics).  · Eating disorders (bulimia).  · Low magnesium level.  · Sweating a lot.  SIGNS AND SYMPTOMS  · Weakness.  · Constipation.  · Fatigue.  · Muscle cramps.  · Mental confusion.  · Skipped heartbeats or irregular heartbeat (palpitations).  · Tingling or numbness.  DIAGNOSIS   Your health care provider can diagnose hypokalemia with blood tests. In addition to checking your potassium level, your health care provider may also check other lab tests.  TREATMENT  Hypokalemia can be treated with potassium supplements taken by mouth or adjustments in your current medicines. If your potassium level is very low, you may need to get potassium through a vein (IV) and be monitored in the hospital. A diet high in potassium is also helpful. Foods high in potassium are:  · Nuts, such as peanuts and pistachios.  · Seeds, such as sunflower seeds and pumpkin seeds.  · Peas, lentils, and lima beans.  · Whole grain and bran cereals and breads.  · Fresh fruit and  vegetables, such as apricots, avocado, bananas, cantaloupe, kiwi, oranges, tomatoes, asparagus, and potatoes.  · Orange and tomato juices.  · Red meats.  · Fruit yogurt.  HOME CARE INSTRUCTIONS  · Take all medicines as prescribed by your health care provider.  · Maintain a healthy diet by including nutritious food, such as fruits, vegetables, nuts, whole grains, and lean meats.  · If you are taking a laxative, be sure to follow the directions on the label.  SEEK MEDICAL CARE IF:  · Your weakness gets worse.  · You feel your heart pounding or racing.  · You are vomiting or having diarrhea.  · You are diabetic and having trouble keeping your blood glucose in the normal range.  SEEK IMMEDIATE MEDICAL CARE IF:  · You have chest pain, shortness of breath, or dizziness.  · You are vomiting or having diarrhea for more than 2 days.  · You faint.  MAKE SURE YOU:   · Understand these instructions.  · Will watch your condition.  · Will get help right away if you are not doing well or get worse.     This information is not intended to replace advice given to you by your health care provider. Make sure you discuss any questions you have with your health care provider.     Document Released: 12/18/2006 Document Revised: 01/08/2016 Document Reviewed: 06/20/2014  [employer] Interactive Patient Education ©2016 [employer]    Hyponatremia  Hyponatremia is when the amount of salt (sodium) in your blood is too low. When sodium levels are low, your cells absorb extra water and they swell. The swelling happens throughout the body, but it mostly affects the brain.  CAUSES  This condition may be caused by:  · Heart, kidney, or liver problems.  · Thyroid problems.  · Adrenal gland problems.  · Metabolic conditions, such as syndrome of inappropriate antidiuretic hormone (SIADH).  · Severe vomiting and diarrhea.  · Certain medicines or illegal drugs.  · Dehydration.  · Drinking too much water.  · Eating a diet that is low in sodium.  · Large  burns on your body.  · Sweating.  RISK FACTORS  This condition is more likely to develop in people who:  · Have long-term (chronic) kidney disease.  · Have heart failure.  · Have a medical condition that causes frequent or excessive diarrhea.  · Have metabolic conditions, such as Benzie disease or SIADH.  · Take certain medicines that affect the sodium and fluid balance in the blood. Some of these medicine types include:  ¨ Diuretics.  ¨ NSAIDs.  ¨ Some opioid pain medicines.  ¨ Some antidepressants.  ¨ Some seizure prevention medicines.  SYMPTOMS   Symptoms of this condition include:  · Nausea and vomiting.  · Confusion.  · Lethargy.  · Agitation.  · Headache.  · Seizures.  · Unconsciousness.  · Appetite loss.  · Muscle weakness and cramping.  · Feeling weak or light-headed.  · Having a rapid heart rate.  · Fainting, in severe cases.  DIAGNOSIS  This condition is diagnosed with a medical history and physical exam. You will also have other tests, including:  · Blood tests.  · Urine tests.  TREATMENT  Treatment for this condition depends on the cause. Treatment may include:  · Fluids given through an IV tube that is inserted into one of your veins.  · Medicines to correct the sodium imbalance. If medicines are causing the condition, the medicines will need to be adjusted.  · Limiting water or fluid intake to get the correct sodium balance.  HOME CARE INSTRUCTIONS  · Take medicines only as directed by your health care provider. Many medicines can make this condition worse. Talk with your health care provider about any medicines that you are currently taking.  · Carefully follow a recommended diet as directed by your health care provider.  · Carefully follow instructions from your health care provider about fluid restrictions.  · Keep all follow-up visits as directed by your health care provider. This is important.  · Do not drink alcohol.  SEEK MEDICAL CARE IF:  · You develop worsening nausea, fatigue, headache,  confusion, or weakness.  · Your symptoms go away and then return.  · You have problems following the recommended diet.  SEEK IMMEDIATE MEDICAL CARE IF:  · You have a seizure.  · You faint.  · You have ongoing diarrhea or vomiting.     This information is not intended to replace advice given to you by your health care provider. Make sure you discuss any questions you have with your health care provider.     Document Released: 12/08/2003 Document Revised: 05/03/2016 Document Reviewed: 01/07/2016  Elsevier Interactive Patient Education ©2016 [employer]            Patient Information     Patient Information    Following emergency treatment: all patient requiring follow-up care must return either to a private physician or a clinic if your condition worsens before you are able to obtain further medical attention, please return to the emergency room.     Billing Information    At [facility], we work to make the billing process streamlined for our patients.  Our Representatives are here to answer any questions you may have regarding your hospital bill.  If you have insurance coverage and have supplied your insurance information to us, we will submit a claim to your insurer on your behalf.  Should you have any questions regarding your bill, we can be reached online or by phone as follows:  Online: You are able pay your bills online or live chat with our representatives about any billing questions you may have. We are here to help Monday - Friday from 8:00am to 7:30pm and 9:00am - 12:00pm on Saturdays.  Please visit [email or web address]  for more information.   Phone:  [phone] or [phone]    Please note that your emergency physician, surgeon, pathologist, radiologist, anesthesiologist, and other specialists are not employed by [facility] and will therefore bill separately for their services.  Please contact them directly for any questions concerning their bills at the numbers  below:     Emergency Physician Services:  [phone]  [facility]:  [phone]  [facility]:  [phone]  [facility]:  [phone]    1. Your final bill may vary from the amount quoted upon discharge if all procedures are not complete at that time, or if your doctor has additional procedures of which we are not aware. You will receive an additional bill if you return to the Emergency Department at [facility] for suture removal regardless of the facility of which the sutures were placed.     2. Please arrange for settlement of this account at the emergency registration.    3. All self-pay accounts are due in full at the time of treatment.  If you are unable to meet this obligation then payment is expected within 4-5 days.     4. If you have had radiology studies (CT, X-ray, Ultrasound, MRI), you have received a preliminary result during your emergency department visit. Please contact the radiology department [phone] to receive a copy of your final result. Please discuss the Final result with your primary physician or with the follow up physician provided.     Crisis Hotline:  [place] Crisis Hotline:  9-608-TZYNDMO or 1-164.264.7408  [place] Crisis Hotline:    1-152.247.9592 or 177-269-7835         ED Discharge Follow Up Questions    1. In order to provide you with very good care, we would like to follow up with a phone call in the next few days.  May we have your permission to contact you?     YES /  NO    2. What is the best phone number to call you? (       )_____-__________    3. What is the best time to call you?      Morning  /  Afternoon  /  Evening                   Patient Signature:  ____________________________________________________________    Date:  ____________________________________________________________      Your appointments     Aug 16, 2017  1:00 PM   Pulmonary Function Test with PFT-RM2   [facility]  (--)    [address]  [address]  [place] [address]   [phone]            Aug 16, 2017  2:40 PM   Established Patient Pul with FARHAT Spencer   [facility] (--)    [address]  [address]  [place] [address]   [phone]            Sep 11, 2017  1:00 PM   FOLLOW UP with Kin Shelley M.D.   [facility] (--)    [address], [address]  [place] [address]   [phone]

## 2017-08-01 NOTE — DISCHARGE INSTRUCTIONS
Hypokalemia  Hypokalemia means that the amount of potassium in the blood is lower than normal. Potassium is a chemical, called an electrolyte, that helps regulate the amount of fluid in the body. It also stimulates muscle contraction and helps nerves function properly. Most of the body's potassium is inside of cells, and only a very small amount is in the blood. Because the amount in the blood is so small, minor changes can be life-threatening.  CAUSES  · Antibiotics.  · Diarrhea or vomiting.  · Using laxatives too much, which can cause diarrhea.  · Chronic kidney disease.  · Water pills (diuretics).  · Eating disorders (bulimia).  · Low magnesium level.  · Sweating a lot.  SIGNS AND SYMPTOMS  · Weakness.  · Constipation.  · Fatigue.  · Muscle cramps.  · Mental confusion.  · Skipped heartbeats or irregular heartbeat (palpitations).  · Tingling or numbness.  DIAGNOSIS   Your health care provider can diagnose hypokalemia with blood tests. In addition to checking your potassium level, your health care provider may also check other lab tests.  TREATMENT  Hypokalemia can be treated with potassium supplements taken by mouth or adjustments in your current medicines. If your potassium level is very low, you may need to get potassium through a vein (IV) and be monitored in the hospital. A diet high in potassium is also helpful. Foods high in potassium are:  · Nuts, such as peanuts and pistachios.  · Seeds, such as sunflower seeds and pumpkin seeds.  · Peas, lentils, and lima beans.  · Whole grain and bran cereals and breads.  · Fresh fruit and vegetables, such as apricots, avocado, bananas, cantaloupe, kiwi, oranges, tomatoes, asparagus, and potatoes.  · Orange and tomato juices.  · Red meats.  · Fruit yogurt.  HOME CARE INSTRUCTIONS  · Take all medicines as prescribed by your health care provider.  · Maintain a healthy diet by including nutritious food, such as fruits, vegetables, nuts, whole grains, and lean meats.  · If  you are taking a laxative, be sure to follow the directions on the label.  SEEK MEDICAL CARE IF:  · Your weakness gets worse.  · You feel your heart pounding or racing.  · You are vomiting or having diarrhea.  · You are diabetic and having trouble keeping your blood glucose in the normal range.  SEEK IMMEDIATE MEDICAL CARE IF:  · You have chest pain, shortness of breath, or dizziness.  · You are vomiting or having diarrhea for more than 2 days.  · You faint.  MAKE SURE YOU:   · Understand these instructions.  · Will watch your condition.  · Will get help right away if you are not doing well or get worse.     This information is not intended to replace advice given to you by your health care provider. Make sure you discuss any questions you have with your health care provider.     Document Released: 12/18/2006 Document Revised: 01/08/2016 Document Reviewed: 06/20/2014  [employer] Interactive Patient Education ©2016 [employer]    Hyponatremia  Hyponatremia is when the amount of salt (sodium) in your blood is too low. When sodium levels are low, your cells absorb extra water and they swell. The swelling happens throughout the body, but it mostly affects the brain.  CAUSES  This condition may be caused by:  · Heart, kidney, or liver problems.  · Thyroid problems.  · Adrenal gland problems.  · Metabolic conditions, such as syndrome of inappropriate antidiuretic hormone (SIADH).  · Severe vomiting and diarrhea.  · Certain medicines or illegal drugs.  · Dehydration.  · Drinking too much water.  · Eating a diet that is low in sodium.  · Large burns on your body.  · Sweating.  RISK FACTORS  This condition is more likely to develop in people who:  · Have long-term (chronic) kidney disease.  · Have heart failure.  · Have a medical condition that causes frequent or excessive diarrhea.  · Have metabolic conditions, such as Christiano disease or SIADH.  · Take certain medicines that affect the sodium and fluid balance in the blood.  Some of these medicine types include:  ¨ Diuretics.  ¨ NSAIDs.  ¨ Some opioid pain medicines.  ¨ Some antidepressants.  ¨ Some seizure prevention medicines.  SYMPTOMS   Symptoms of this condition include:  · Nausea and vomiting.  · Confusion.  · Lethargy.  · Agitation.  · Headache.  · Seizures.  · Unconsciousness.  · Appetite loss.  · Muscle weakness and cramping.  · Feeling weak or light-headed.  · Having a rapid heart rate.  · Fainting, in severe cases.  DIAGNOSIS  This condition is diagnosed with a medical history and physical exam. You will also have other tests, including:  · Blood tests.  · Urine tests.  TREATMENT  Treatment for this condition depends on the cause. Treatment may include:  · Fluids given through an IV tube that is inserted into one of your veins.  · Medicines to correct the sodium imbalance. If medicines are causing the condition, the medicines will need to be adjusted.  · Limiting water or fluid intake to get the correct sodium balance.  HOME CARE INSTRUCTIONS  · Take medicines only as directed by your health care provider. Many medicines can make this condition worse. Talk with your health care provider about any medicines that you are currently taking.  · Carefully follow a recommended diet as directed by your health care provider.  · Carefully follow instructions from your health care provider about fluid restrictions.  · Keep all follow-up visits as directed by your health care provider. This is important.  · Do not drink alcohol.  SEEK MEDICAL CARE IF:  · You develop worsening nausea, fatigue, headache, confusion, or weakness.  · Your symptoms go away and then return.  · You have problems following the recommended diet.  SEEK IMMEDIATE MEDICAL CARE IF:  · You have a seizure.  · You faint.  · You have ongoing diarrhea or vomiting.     This information is not intended to replace advice given to you by your health care provider. Make sure you discuss any questions you have with your health  care provider.     Document Released: 12/08/2003 Document Revised: 05/03/2016 Document Reviewed: 01/07/2016  Elsevier Interactive Patient Education ©2016 Elsevier Inc.

## 2017-08-01 NOTE — ED NOTES
PIV placed to R fa, unable to draw blood.  Pt medicated per MAR.  NS infusing.   called for blood draw.

## 2017-08-01 NOTE — ED AVS SNAPSHOT
SmartKem Access Code: [email or web address]  Expires: 8/27/2017  9:30 AM    SmartKem  A secure, online tool to manage your health information     [employer]’s SmartKem® is a secure, online tool that connects you to your personalized health information from the privacy of your home -- day or night - making it very easy for you to manage your healthcare. Once the activation process is completed, you can even access your medical information using the SmartKem pascale, which is available for free in the Apple Pascale store or Google Play store.     SmartKem provides the following levels of access (as shown below):   My Chart Features   [facility] Primary Care Doctor [facility]  Specialists [facility]  Urgent  Care Non-[facility]  Primary Care  Doctor   Email your healthcare team securely and privately 24/7 X X X X   Manage appointments: schedule your next appointment; view details of past/upcoming appointments X      Request prescription refills. X      View recent personal medical records, including lab and immunizations X X X X   View health record, including health history, allergies, medications X X X X   Read reports about your outpatient visits, procedures, consult and ER notes X X X X   See your discharge summary, which is a recap of your hospital and/or ER visit that includes your diagnosis, lab results, and care plan. X X       How to register for SmartKem:  1. Go to  [email or web address].  2. Click on the Sign Up Now box, which takes you to the New Member Sign Up page. You will need to provide the following information:  a. Enter your SmartKem Access Code exactly as it appears at the top of this page. (You will not need to use this code after you’ve completed the sign-up process. If you do not sign up before the expiration date, you must request a new code.)   b. Enter your date of birth.   c. Enter your home email address.   d. Click Submit, and follow the next screen’s instructions.  3. Create a SmartKem ID. This will be your SmartKem  login ID and cannot be changed, so think of one that is secure and easy to remember.  4. Create a [employer] password. You can change your password at any time.  5. Enter your Password Reset Question and Answer. This can be used at a later time if you forget your password.   6. Enter your e-mail address. This allows you to receive e-mail notifications when new information is available in [employer].  7. Click Sign Up. You can now view your health information.    For assistance activating your [employer] account, call [phone]

## 2017-08-08 ENCOUNTER — TELEPHONE (OUTPATIENT)
Dept: PULMONOLOGY | Facility: HOSPICE | Age: 77
End: 2017-08-08

## 2017-08-08 DIAGNOSIS — J44.9 CHRONIC OBSTRUCTIVE PULMONARY DISEASE, UNSPECIFIED COPD TYPE (HCC): ICD-10-CM

## 2017-08-08 RX ORDER — BUDESONIDE AND FORMOTEROL FUMARATE DIHYDRATE 160; 4.5 UG/1; UG/1
2 AEROSOL RESPIRATORY (INHALATION) 2 TIMES DAILY
Qty: 1 INHALER | Refills: 11 | Status: SHIPPED
Start: 2017-08-08 | End: 2017-08-10 | Stop reason: SDUPTHER

## 2017-08-08 NOTE — TELEPHONE ENCOUNTER
Please re print prescription on plain paper so I can mail it to the patient.  Confirmed address with [facility].

## 2017-08-10 RX ORDER — BUDESONIDE AND FORMOTEROL FUMARATE DIHYDRATE 160; 4.5 UG/1; UG/1
2 AEROSOL RESPIRATORY (INHALATION) 2 TIMES DAILY
Qty: 1 INHALER | Refills: 11 | Status: SHIPPED
Start: 2017-08-10 | End: 2017-08-16 | Stop reason: SDUPTHER

## 2017-08-16 ENCOUNTER — OFFICE VISIT (OUTPATIENT)
Dept: PULMONOLOGY | Facility: HOSPICE | Age: 77
End: 2017-08-16
Payer: MEDICARE

## 2017-08-16 ENCOUNTER — NON-PROVIDER VISIT (OUTPATIENT)
Dept: PULMONOLOGY | Facility: HOSPICE | Age: 77
End: 2017-08-16
Payer: MEDICARE

## 2017-08-16 VITALS
HEIGHT: 62 IN | OXYGEN SATURATION: 90 % | HEART RATE: 84 BPM | SYSTOLIC BLOOD PRESSURE: 121 MMHG | BODY MASS INDEX: 24.11 KG/M2 | TEMPERATURE: 98.1 F | WEIGHT: 131 LBS | RESPIRATION RATE: 15 BRPM | DIASTOLIC BLOOD PRESSURE: 70 MMHG

## 2017-08-16 DIAGNOSIS — J44.9 CHRONIC OBSTRUCTIVE PULMONARY DISEASE, UNSPECIFIED COPD TYPE (HCC): Chronic | ICD-10-CM

## 2017-08-16 PROCEDURE — 94726 PLETHYSMOGRAPHY LUNG VOLUMES: CPT | Performed by: INTERNAL MEDICINE

## 2017-08-16 PROCEDURE — 94729 DIFFUSING CAPACITY: CPT | Performed by: INTERNAL MEDICINE

## 2017-08-16 PROCEDURE — 99213 OFFICE O/P EST LOW 20 MIN: CPT | Performed by: NURSE PRACTITIONER

## 2017-08-16 PROCEDURE — 94060 EVALUATION OF WHEEZING: CPT | Performed by: INTERNAL MEDICINE

## 2017-08-16 RX ORDER — BUDESONIDE AND FORMOTEROL FUMARATE DIHYDRATE 160; 4.5 UG/1; UG/1
2 AEROSOL RESPIRATORY (INHALATION) 2 TIMES DAILY
Qty: 1 INHALER | Refills: 11 | Status: SHIPPED | OUTPATIENT
Start: 2017-08-16

## 2017-08-16 ASSESSMENT — PULMONARY FUNCTION TESTS
FEV1_PREDICTED: 1.74
FEV1/FVC_PERCENT_PREDICTED: 74
FVC: 1.84
FEV1/FVC_PERCENT_PREDICTED: 81
FVC_PERCENT_PREDICTED: 78
FVC_PREDICTED: 2.35
FEV1_PERCENT_PREDICTED: 58
FEV1: 1.11
FEV1_PERCENT_CHANGE: -8
FVC: 1.67
FEV1/FVC: 60
FEV1: 1.02
FVC_PERCENT_PREDICTED: 71
FEV1_PERCENT_CHANGE: -8
FEV1/FVC: 61.08
FEV1_PERCENT_PREDICTED: 63
FEV1/FVC_PERCENT_PREDICTED: 82
FEV1/FVC_PERCENT_CHANGE: 100

## 2017-08-16 NOTE — MR AVS SNAPSHOT
"[facility] José Miguel   2017 2:40 PM   Office Visit   MRN: [id number]    Department:  [facility]   Dept Phone:  [phone]    Description:  Female : 1940   Provider:  FARHAT Spencer           Reason for Visit     Results PFT      Allergies as of 2017     No Known Allergies      You were diagnosed with     Chronic obstructive pulmonary disease, unspecified COPD type (CMS-[facility])   [[id number]]         Vital Signs     Blood Pressure Pulse Temperature Respirations Height Weight    121/70 mmHg 84 36.7 °C (98.1 °F) 15 1.575 m (5' 2.01\") 59.421 kg (131 lb)    Body Mass Index Oxygen Saturation Smoking Status             23.95 kg/m2 90% Former Smoker         Basic Information     Date Of Birth Sex Race Ethnicity Preferred Language    1940 Female White Non- English      Your appointments     Aug 16, 2017  2:40 PM   Established Patient Pul with FARHAT Spencer   [facility] (--)    [address]  [address]  [place] [address] [phone]            Sep 11, 2017  1:15 PM   FOLLOW UP with Kin Shelley M.D.   [facility] (--)    [address], [address]  [place] [id number] [phone]              Problem List              ICD-10-CM Priority Class Noted - Resolved    Atrial fibrillation (CMS-HCC) (Chronic) I48.91   Unknown - Present    Long term current use of anticoagulant therapy (Chronic) Z79.01   Unknown - Present    COPD (chronic obstructive pulmonary disease) (CMS-HCC) (Chronic) J44.9   Unknown - Present    Cor pulmonale (CMS-HCC) (Chronic) I27.81   Unknown - Present    HTN (hypertension) (Chronic) I10   Unknown - Present    Menopausal and postmenopausal disorder (Chronic) N95.9   Unknown - Present    PVC (premature ventricular contraction) (Chronic) I49.3   Unknown - Present    Hypoxemia R09.02   2016 - Present      Health Maintenance        Date Due Completion Dates    IMM DTaP/Tdap/Td " Vaccine (1 - Tdap) 12/13/1958 ---    PAP SMEAR 12/13/1960 ---    MAMMOGRAM 12/13/1979 ---    COLONOSCOPY 12/13/1989 ---    IMM ZOSTER VACCINE 12/13/1999 ---    BONE DENSITY 12/13/2004 ---    IMM PNEUMOCOCCAL 65+ (ADULT) LOW/MEDIUM RISK SERIES (1 of 2 - PCV13) 12/13/2004 ---    IMM INFLUENZA (1) 9/1/2017 ---            Current Immunizations     13-VALENT PCV PREVNAR 10/26/2016    Influenza TIV (IM) 10/26/2016      Below and/or attached are the medications your provider expects you to take. Review all of your home medications and newly ordered medications with your provider and/or pharmacist. Follow medication instructions as directed by your provider and/or pharmacist. Please keep your medication list with you and share with your provider. Update the information when medications are discontinued, doses are changed, or new medications (including over-the-counter products) are added; and carry medication information at all times in the event of emergency situations     Allergies:  No Known Allergies          Medications  Valid as of: August 16, 2017 -  1:54 PM    Generic Name Brand Name Tablet Size Instructions for use    Acetaminophen (Tab) TYLENOL 325 MG Take 650 mg by mouth every four hours as needed.        Albuterol Sulfate (Aero Soln) albuterol 108 (90 BASE) MCG/ACT Inhale 2 Puffs by mouth every four hours as needed for Shortness of Breath.        Apixaban (Tab) ELIQUIS 5mg Take 1 Tab by mouth 2 Times a Day.        Ascorbic Acid   Take 1 Tab by mouth every day.          Atenolol (Tab) TENORMIN 50 MG Take 50 mg by mouth every day.        Budesonide-Formoterol Fumarate (Aerosol) SYMBICORT 160-4.5 MCG/ACT Inhale 2 Puffs by mouth 2 Times a Day.        HydroCHLOROthiazide (Tab) HYDRODIURIL 12.5 MG Take 12.5 mg by mouth every day.        Losartan Potassium (Tab) COZAAR 25 MG Take 25 mg by mouth every day.        Omega-3 Fatty Acids   Take 1 Cap by mouth every day. Pt. Taking 2 times a week        Potassium Chloride  (Tab CR) KLOR-CON 10 MEQ Take 10 mEq by mouth every day.          Potassium Chloride Sharona CR (Tab CR) Kdur 20 MEQ Take 1 Tab by mouth 2 times a day.        Prenatal Vit-Fe Fumarate-FA   Take  by mouth.        Tiotropium Bromide Monohydrate (Cap) SPIRIVA 18 MCG Use 1 capsule inhaled from handihaler every day.        Umeclidinium Bromide (AEROSOL POWDER, BREATH ACTIVATED) Umeclidinium Bromide 62.5 MCG/INH Inhale 1 Puff by mouth every day.        .                 Medicines prescribed today were sent to:     [id number] - [place], [place] - [address] [place] [address]    Phone: [phone] Fax: [phone]    Open 24 Hours?: No      Medication refill instructions:       If your prescription bottle indicates you have medication refills left, it is not necessary to call your provider’s office. Please contact your pharmacy and they will refill your medication.    If your prescription bottle indicates you do not have any refills left, you may request refills at any time through one of the following ways: The online Neverfail system (except Urgent Care), by calling your provider’s office, or by asking your pharmacy to contact your provider’s office with a refill request. Medication refills are processed only during regular business hours and may not be available until the next business day. Your provider may request additional information or to have a follow-up visit with you prior to refilling your medication.   *Please Note: Medication refills are assigned a new Rx number when refilled electronically. Your pharmacy may indicate that no refills were authorized even though a new prescription for the same medication is available at the pharmacy. Please request the medicine by name with the pharmacy before contacting your provider for a refill.        Instructions    1. continue Symbicort 160/4.5 µg 2 inhalations twice daily and Spiriva daily.            Neverfail Access Code: [email or web address]  Expires:  8/27/2017  9:30 AM    [employer]  A secure, online tool to manage your health information     [employer] BTCJam® is a secure, online tool that connects you to your personalized health information from the privacy of your home -- day or night - making it very easy for you to manage your healthcare. Once the activation process is completed, you can even access your medical information using the BTCJam pascale, which is available for free in the Apple Pascale store or Google Play store.     BTCJam provides the following levels of access (as shown below):   My Chart Features   [facility] Primary Care Doctor [facility]  Specialists [facility]  Urgent  Care Non-[facility]  Primary Care  Doctor   Email your healthcare team securely and privately 24/7 X X X    Manage appointments: schedule your next appointment; view details of past/upcoming appointments X      Request prescription refills. X      View recent personal medical records, including lab and immunizations X X X X   View health record, including health history, allergies, medications X X X X   Read reports about your outpatient visits, procedures, consult and ER notes X X X X   See your discharge summary, which is a recap of your hospital and/or ER visit that includes your diagnosis, lab results, and care plan. X X       How to register for BTCJam:  1. Go to  [email or web address].  2. Click on the Sign Up Now box, which takes you to the New Member Sign Up page. You will need to provide the following information:  a. Enter your BTCJam Access Code exactly as it appears at the top of this page. (You will not need to use this code after you’ve completed the sign-up process. If you do not sign up before the expiration date, you must request a new code.)   b. Enter your date of birth.   c. Enter your home email address.   d. Click Submit, and follow the next screen’s instructions.  3. Create a BTCJam ID. This will be your BTCJam login ID and cannot be changed, so think of one  that is secure and easy to remember.  4. Create a [employer] password. You can change your password at any time.  5. Enter your Password Reset Question and Answer. This can be used at a later time if you forget your password.   6. Enter your e-mail address. This allows you to receive e-mail notifications when new information is available in [employer].  7. Click Sign Up. You can now view your health information.    For assistance activating your [employer] account, call [phone]

## 2017-08-16 NOTE — PROGRESS NOTES
"Chief Complaint   Patient presents with   • Results     PFT         HPI:  This is a 76 y.o. female with a history of chronic obstructive pulmonary disease. Pulmonary function tests from April 2016 indicated FEV1 1.11 L, 62% predicted, FEV1/FVC 64%, and DLCO 111% predicted. She is compliant with Symbicort 160/4.5 µg 2 puffs twice daily and Spiriva daily. Patient reports doing quite well. She has no wheezing, fever, chills, sweats, or hemoptysis. She has shortness of breath occasionally with exertion. She has no other complaints.    Past Medical History   Diagnosis Date   • Atrial fibrillation (CMS-[facility])    • Long term (current) use of anticoagulants    • COPD (chronic obstructive pulmonary disease) (CMS-[facility])    • Cor pulmonale (CMS-[facility])    • HTN (hypertension)    • Unspecified menopausal and postmenopausal disorder    • PVC (premature ventricular contraction)        Past Surgical History   Procedure Laterality Date   • Cataract extraction with iol     • Cholecystectomy       LAPAROSCOPIC.   • Knee arthroscopy     • Retinal detachment repair         Social History   Substance Use Topics   • Smoking status: Former Smoker -- 1.00 packs/day for 28 years     Types: Cigarettes     Start date: 01/01/1953     Quit date: 01/01/1981   • Smokeless tobacco: Never Used   • Alcohol Use: 3.0 - 6.0 oz/week     5-10 Standard drinks or equivalent per week      Comment: WINE       ROS:   Constitutional: Denies fevers, chills, sweats, fatigue, and weight loss.  Eyes: Denies glasses.  Ears/nose/mouth/throat: Denies injury.  Cardiovascular: Denies chest pain, tightness.  Respiratory: See history of present illness.  GI: Denies heartburn, difficulty swallowing, nausea, and vomiting.  Neurological: Denies frequent headaches, dizziness, weakness.    Vitals:  Filed Vitals:    08/16/17 1324   Height: 1.575 m (5' 2.01\")   Weight: 59.421 kg (131 lb)   Weight % change since last entry.: 0 %   BP: 121/70   Pulse: 84   BMI (Calculated): 23.95 "   Resp: 15   Temp: 36.7 °C (98.1 °F)   O2 sat % room air: 90 %       Allergies:  Review of patient's allergies indicates no known allergies.    Medications:  Current Outpatient Prescriptions   Medication Sig Dispense Refill   • budesonide-formoterol (SYMBICORT) 160-4.5 MCG/ACT Aerosol Inhale 2 Puffs by mouth 2 Times a Day. 1 Inhaler 11   • potassium chloride SA (KDUR) 20 MEQ Tab CR Take 1 Tab by mouth 2 times a day. 30 Tab 0   • apixaban (ELIQUIS) 5mg Tab Take 1 Tab by mouth 2 Times a Day. 56 Tab 0   • Umeclidinium Bromide (INCRUSE ELLIPTA) 62.5 MCG/INH AEROSOL POWDER, BREATH ACTIVATED Inhale 1 Puff by mouth every day. 1 Each 5   • tiotropium (SPIRIVA HANDIHALER) 18 MCG Cap Use 1 capsule inhaled from handihaler every day. 90 Cap 3   • hydrochlorothiazide (HYDRODIURIL) 12.5 MG tablet Take 12.5 mg by mouth every day.     • losartan (COZAAR) 25 MG Tab Take 25 mg by mouth every day.     • atenolol (TENORMIN) 50 MG Tab Take 50 mg by mouth every day.     • acetaminophen (TYLENOL) 325 MG TABS Take 650 mg by mouth every four hours as needed.     • Prenatal Vit-Fe Fumarate-FA (PRENATAL ONE DAILY PO) Take  by mouth.     • Omega-3 Fatty Acids (FISH OIL PO) Take 1 Cap by mouth every day. Pt. Taking 2 times a week     • potassium chloride CR (K-DUR) 10 MEQ tablet Take 10 mEq by mouth every day.       • Ascorbic Acid (VITAMIN C PO) Take 1 Tab by mouth every day.       • albuterol (VENTOLIN OR PROVENTIL) 108 (90 BASE) MCG/ACT Aero Soln inhalation aerosol Inhale 2 Puffs by mouth every four hours as needed for Shortness of Breath.       No current facility-administered medications for this visit.       PHYSICAL EXAM:  Appearance: Well-developed, well-nourished, no acute distress.  Eyes. PERRL.  Hearing: Grossly intact.  Oropharynx: Tongue normal, posterior pharynx without erythema or exudate.  Respiratory effort: No intercostal retractions or use of accessory muscles.  Lung auscultation: No crackles, wheezing.  Heart auscultation:  No murmur, gallop, or rub. Regular rate and rhythm.  Extremities: No cyanosis or edema.  Gait and Station: Normal  Orientation: Oriented to time, place, and person.    Assessment:  1. Chronic obstructive pulmonary disease, unspecified COPD type (CMS-HCC)  budesonide-formoterol (SYMBICORT) 160-4.5 MCG/ACT Aerosol         Plan:  1. Continue Symbicort 160/4.5 µg 2 inhalations twice daily and Spiriva daily.     Return in about 6 months (around 2/16/2018) for With AWA Aguilar.

## 2017-08-16 NOTE — PROCEDURES
Good patient effort & cooperation.  The results of this test meet the ATS/ERS standards for acceptability and repeatability.  A bronchodilator of Ventolin HFA- 2puffs via spacer were administered.  The DLCO was uncorrected for Hgb.    SPIROMETRY:  1. FVC was 1.84 L, 78 % of predicted  2. FEV1 was 1.11 L, 63 % of predicted   3. FEV1/FVC ratio was 61 %  4. There was no significant response to bronchodilators   5. Flow volume loop scooped up consistent with obstruction     LUNG VOLUMES:  1. TLC was 108 % of predicted   2. RV was  142 % of predicted     DIFFUSION CAPACITY:  1.Defusion capacity was  95 % of predicted       IMPRESSION:  Patient has moderate obstructive ventilatory defect. These findings are consistent with the patient listed diagnosis of COPD. Clinical correlation is required.

## 2017-08-16 NOTE — MR AVS SNAPSHOT
Rox José Miguel   2017 1:00 PM   Non-Provider Visit   MRN: [id number]    Department:  [facility]   Dept Phone:  [phone]    Description:  Female : 1940   Provider:  Bonita Lockhart M.D.           Reason for Visit     COPD           Allergies as of 2017     No Known Allergies      You were diagnosed with     Chronic obstructive pulmonary disease, unspecified COPD type (CMS-[facility])   [[id number]]         Vital Signs     Smoking Status                   Former Smoker           Basic Information     Date Of Birth Sex Race Ethnicity Preferred Language    1940 Female White Non- English      Your appointments     Aug 16, 2017  2:40 PM   Established Patient Pul with FARHAT Spencer   [facility] (--)    [address]  [address]  [place] [id number] [phone]            Sep 11, 2017  1:15 PM   FOLLOW UP with Kin Shelley M.D.   [facility] (--)    [address], [address]  [place] [id number] [phone]              Problem List              ICD-10-CM Priority Class Noted - Resolved    Atrial fibrillation (CMS-HCC) (Chronic) I48.91   Unknown - Present    Long term current use of anticoagulant therapy (Chronic) Z79.01   Unknown - Present    COPD (chronic obstructive pulmonary disease) (CMS-HCC) (Chronic) J44.9   Unknown - Present    Cor pulmonale (CMS-[facility]) (Chronic) I27.81   Unknown - Present    HTN (hypertension) (Chronic) I10   Unknown - Present    Menopausal and postmenopausal disorder (Chronic) N95.9   Unknown - Present    PVC (premature ventricular contraction) (Chronic) I49.3   Unknown - Present    Hypoxemia R09.02   2016 - Present      Health Maintenance        Date Due Completion Dates    IMM DTaP/Tdap/Td Vaccine (1 - Tdap) 1958 ---    PAP SMEAR 1960 ---    MAMMOGRAM 1979 ---    COLONOSCOPY 1989 ---    IMM ZOSTER VACCINE 1999 ---    BONE DENSITY 2004  ---    IMM PNEUMOCOCCAL 65+ (ADULT) LOW/MEDIUM RISK SERIES (1 of 2 - PCV13) 12/13/2004 ---    IMM INFLUENZA (1) 9/1/2017 ---            Current Immunizations     13-VALENT PCV PREVNAR 10/26/2016    Influenza TIV (IM) 10/26/2016      Below and/or attached are the medications your provider expects you to take. Review all of your home medications and newly ordered medications with your provider and/or pharmacist. Follow medication instructions as directed by your provider and/or pharmacist. Please keep your medication list with you and share with your provider. Update the information when medications are discontinued, doses are changed, or new medications (including over-the-counter products) are added; and carry medication information at all times in the event of emergency situations     Allergies:  No Known Allergies          Medications  Valid as of: August 16, 2017 -  1:32 PM    Generic Name Brand Name Tablet Size Instructions for use    Acetaminophen (Tab) TYLENOL 325 MG Take 650 mg by mouth every four hours as needed.        Albuterol Sulfate (Aero Soln) albuterol 108 (90 BASE) MCG/ACT Inhale 2 Puffs by mouth every four hours as needed for Shortness of Breath.        Apixaban (Tab) ELIQUIS 5mg Take 1 Tab by mouth 2 Times a Day.        Ascorbic Acid   Take 1 Tab by mouth every day.          Atenolol (Tab) TENORMIN 50 MG Take 50 mg by mouth every day.        Budesonide-Formoterol Fumarate (Aerosol) SYMBICORT 160-4.5 MCG/ACT Inhale 2 Puffs by mouth 2 Times a Day.        HydroCHLOROthiazide (Tab) HYDRODIURIL 12.5 MG Take 12.5 mg by mouth every day.        Losartan Potassium (Tab) COZAAR 25 MG Take 25 mg by mouth every day.        Omega-3 Fatty Acids   Take 1 Cap by mouth every day. Pt. Taking 2 times a week        Potassium Chloride (Tab CR) KLOR-CON 10 MEQ Take 10 mEq by mouth every day.          Potassium Chloride Sharona CR (Tab CR) Kdur 20 MEQ Take 1 Tab by mouth 2 times a day.        Prenatal Vit-Fe Fumarate-FA    Take  by mouth.        Tiotropium Bromide Monohydrate (Cap) SPIRIVA 18 MCG Use 1 capsule inhaled from handihaler every day.        Umeclidinium Bromide (AEROSOL POWDER, BREATH ACTIVATED) Umeclidinium Bromide 62.5 MCG/INH Inhale 1 Puff by mouth every day.        .                 Medicines prescribed today were sent to:     [id number] - [place] - [address] [place] [address]    Phone: [phone] Fax: [phone]    Open 24 Hours?: No      Medication refill instructions:       If your prescription bottle indicates you have medication refills left, it is not necessary to call your provider’s office. Please contact your pharmacy and they will refill your medication.    If your prescription bottle indicates you do not have any refills left, you may request refills at any time through one of the following ways: The online [email or web address] system (except Urgent Care), by calling your provider’s office, or by asking your pharmacy to contact your provider’s office with a refill request. Medication refills are processed only during regular business hours and may not be available until the next business day. Your provider may request additional information or to have a follow-up visit with you prior to refilling your medication.   *Please Note: Medication refills are assigned a new Rx number when refilled electronically. Your pharmacy may indicate that no refills were authorized even though a new prescription for the same medication is available at the pharmacy. Please request the medicine by name with the pharmacy before contacting your provider for a refill.           [email or web address] Access Code: [email or web address]  Expires: 8/27/2017  9:30 AM    [email or web address]  A secure, online tool to manage your health information     [employer]’s [email or web address]® is a secure, online tool that connects you to your personalized health information from the privacy of your home -- day or night - making it very easy for you to manage your  healthcare. Once the activation process is completed, you can even access your medical information using the Ulmart pascale, which is available for free in the Apple Pascale store or Google Play store.     Ulmart provides the following levels of access (as shown below):   My Chart Features   [facility] Doctor [facility]  Specialists [facility]  Urgent  Care Non-[facility]  Primary Care  Doctor   Email your healthcare team securely and privately 24/7 X X X    Manage appointments: schedule your next appointment; view details of past/upcoming appointments X      Request prescription refills. X      View recent personal medical records, including lab and immunizations X X X X   View health record, including health history, allergies, medications X X X X   Read reports about your outpatient visits, procedures, consult and ER notes X X X X   See your discharge summary, which is a recap of your hospital and/or ER visit that includes your diagnosis, lab results, and care plan. X X       How to register for Ulmart:  1. Go to  [email or web address].  2. Click on the Sign Up Now box, which takes you to the New Member Sign Up page. You will need to provide the following information:  a. Enter your Ulmart Access Code exactly as it appears at the top of this page. (You will not need to use this code after you’ve completed the sign-up process. If you do not sign up before the expiration date, you must request a new code.)   b. Enter your date of birth.   c. Enter your home email address.   d. Click Submit, and follow the next screen’s instructions.  3. Create a Ulmart ID. This will be your Ulmart login ID and cannot be changed, so think of one that is secure and easy to remember.  4. Create a Ulmart password. You can change your password at any time.  5. Enter your Password Reset Question and Answer. This can be used at a later time if you forget your password.   6. Enter your e-mail address. This allows you to receive e-mail  notifications when new information is available in [employer].  7. Click Sign Up. You can now view your health information.    For assistance activating your [employer] account, call [phone]

## 2017-08-18 ENCOUNTER — HOSPITAL ENCOUNTER (OUTPATIENT)
Dept: LAB | Facility: MEDICAL CENTER | Age: 77
End: 2017-08-18
Attending: FAMILY MEDICINE
Payer: MEDICARE

## 2017-08-18 LAB
ALBUMIN SERPL BCP-MCNC: 3.1 G/DL (ref 3.2–4.9)
ALBUMIN/GLOB SERPL: 1 G/DL
ALP SERPL-CCNC: 80 U/L (ref 30–99)
ALT SERPL-CCNC: 19 U/L (ref 2–50)
ANION GAP SERPL CALC-SCNC: 10 MMOL/L (ref 0–11.9)
AST SERPL-CCNC: 55 U/L (ref 12–45)
BILIRUB SERPL-MCNC: 1.6 MG/DL (ref 0.1–1.5)
BUN SERPL-MCNC: 8 MG/DL (ref 8–22)
CALCIUM SERPL-MCNC: 9.4 MG/DL (ref 8.5–10.5)
CHLORIDE SERPL-SCNC: 99 MMOL/L (ref 96–112)
CO2 SERPL-SCNC: 26 MMOL/L (ref 20–33)
CREAT SERPL-MCNC: 0.66 MG/DL (ref 0.5–1.4)
GFR SERPL CREATININE-BSD FRML MDRD: >60 ML/MIN/1.73 M 2
GLOBULIN SER CALC-MCNC: 3 G/DL (ref 1.9–3.5)
GLUCOSE SERPL-MCNC: 105 MG/DL (ref 65–99)
POTASSIUM SERPL-SCNC: 3.2 MMOL/L (ref 3.6–5.5)
PROT SERPL-MCNC: 6.1 G/DL (ref 6–8.2)
SODIUM SERPL-SCNC: 135 MMOL/L (ref 135–145)

## 2017-08-18 PROCEDURE — 36415 COLL VENOUS BLD VENIPUNCTURE: CPT

## 2017-08-18 PROCEDURE — 80053 COMPREHEN METABOLIC PANEL: CPT

## 2017-08-22 DIAGNOSIS — I48.0 PAROXYSMAL ATRIAL FIBRILLATION (HCC): Chronic | ICD-10-CM

## 2017-09-11 ENCOUNTER — OFFICE VISIT (OUTPATIENT)
Dept: CARDIOLOGY | Facility: MEDICAL CENTER | Age: 77
End: 2017-09-11
Payer: MEDICARE

## 2017-09-11 VITALS
DIASTOLIC BLOOD PRESSURE: 74 MMHG | SYSTOLIC BLOOD PRESSURE: 124 MMHG | HEART RATE: 98 BPM | OXYGEN SATURATION: 93 % | HEIGHT: 62 IN | WEIGHT: 131 LBS | BODY MASS INDEX: 24.11 KG/M2

## 2017-09-11 DIAGNOSIS — I27.81 COR PULMONALE (HCC): Chronic | ICD-10-CM

## 2017-09-11 DIAGNOSIS — J44.9 CHRONIC OBSTRUCTIVE PULMONARY DISEASE, UNSPECIFIED COPD TYPE (HCC): Chronic | ICD-10-CM

## 2017-09-11 DIAGNOSIS — I10 ESSENTIAL HYPERTENSION: Chronic | ICD-10-CM

## 2017-09-11 DIAGNOSIS — I48.0 PAROXYSMAL ATRIAL FIBRILLATION (HCC): Chronic | ICD-10-CM

## 2017-09-11 PROCEDURE — 99214 OFFICE O/P EST MOD 30 MIN: CPT | Performed by: INTERNAL MEDICINE

## 2017-09-11 ASSESSMENT — ENCOUNTER SYMPTOMS
DIZZINESS: 0
WEAKNESS: 0
HEMOPTYSIS: 0
PALPITATIONS: 0
WHEEZING: 0
CONSTITUTIONAL NEGATIVE: 1
EYES NEGATIVE: 1
CHILLS: 0
MUSCULOSKELETAL NEGATIVE: 1
FEVER: 0
SHORTNESS OF BREATH: 0
BRUISES/BLEEDS EASILY: 0
SORE THROAT: 0
PND: 0
CARDIOVASCULAR NEGATIVE: 1
RESPIRATORY NEGATIVE: 1
ORTHOPNEA: 0
STRIDOR: 0
NEUROLOGICAL NEGATIVE: 1
LOSS OF CONSCIOUSNESS: 0
COUGH: 0
SPUTUM PRODUCTION: 0
CLAUDICATION: 0
GASTROINTESTINAL NEGATIVE: 1

## 2017-09-11 NOTE — LETTER
[facility]   [address]  [place] [address]  Phone: [phone]  Fax: [phone]              Rox Valdez  1940    Encounter Date: 9/11/2017    Kin Shelley M.D.          PROGRESS NOTE:  Subjective:   Rox Valdez is a 76 y.o. female who presents today as a follow-up for  Her chronic diastolic heart failure and cor pulmonale. Since she was last seen she stopped the Lasix. She's also been having issues with low potassium. She was seen in the ER for potassium of 2.8 receive IV therapy. She is also on oral replacement and thinks that her labs were rechecked but doesn't know the results of these. She thinks she had this done about a week to 2 weeks ago.    Past Medical History:   Diagnosis Date   • Atrial fibrillation (CMS-HCC)    • COPD (chronic obstructive pulmonary disease) (CMS-HCC)    • Cor pulmonale (CMS-HCC)    • HTN (hypertension)    • Long term (current) use of anticoagulants    • PVC (premature ventricular contraction)    • Unspecified menopausal and postmenopausal disorder      Past Surgical History:   Procedure Laterality Date   • CATARACT EXTRACTION WITH IOL     • CHOLECYSTECTOMY      LAPAROSCOPIC.   • KNEE ARTHROSCOPY     • RETINAL DETACHMENT REPAIR       Family History   Problem Relation Age of Onset   • Hypertension Mother    • Heart Disease Brother      Atrial Fibrillation.     History   Smoking Status   • Former Smoker   • Packs/day: 1.00   • Years: 28.00   • Types: Cigarettes   • Start date: 1/1/1953   • Quit date: 1/1/1981   Smokeless Tobacco   • Never Used     No Known Allergies  Outpatient Encounter Prescriptions as of 9/11/2017   Medication Sig Dispense Refill   • apixaban (ELIQUIS) 5mg Tab Take 1 Tab by mouth 2 Times a Day. 180 Tab 0   • budesonide-formoterol (SYMBICORT) 160-4.5 MCG/ACT Aerosol Inhale 2 Puffs by mouth 2 Times a Day. 1 Inhaler 11   • Umeclidinium Bromide (INCRUSE ELLIPTA) 62.5 MCG/INH AEROSOL POWDER,  BREATH ACTIVATED Inhale 1 Puff by mouth every day. 1 Each 5   • tiotropium (SPIRIVA HANDIHALER) 18 MCG Cap Use 1 capsule inhaled from handihaler every day. 90 Cap 3   • hydrochlorothiazide (HYDRODIURIL) 12.5 MG tablet Take 12.5 mg by mouth every day.     • losartan (COZAAR) 25 MG Tab Take 25 mg by mouth every day.     • atenolol (TENORMIN) 50 MG Tab Take 50 mg by mouth every day.     • acetaminophen (TYLENOL) 325 MG TABS Take 650 mg by mouth every four hours as needed.     • Omega-3 Fatty Acids (FISH OIL PO) Take 1 Cap by mouth every day. Pt. Taking 2 times a week     • potassium chloride CR (K-DUR) 10 MEQ tablet Take 10 mEq by mouth every day.       • Ascorbic Acid (VITAMIN C PO) Take 1 Tab by mouth every day.       • [DISCONTINUED] potassium chloride SA (KDUR) 20 MEQ Tab CR Take 1 Tab by mouth 2 times a day. (Patient not taking: Reported on 9/11/2017) 30 Tab 0   • [DISCONTINUED] albuterol (VENTOLIN OR PROVENTIL) 108 (90 BASE) MCG/ACT Aero Soln inhalation aerosol Inhale 2 Puffs by mouth every four hours as needed for Shortness of Breath.     • [DISCONTINUED] Prenatal Vit-Fe Fumarate-FA (PRENATAL ONE DAILY PO) Take  by mouth.       No facility-administered encounter medications on file as of 9/11/2017.      Review of Systems   Constitutional: Negative.  Negative for chills, fever and malaise/fatigue.   HENT: Negative.  Negative for sore throat.    Eyes: Negative.    Respiratory: Negative.  Negative for cough, hemoptysis, sputum production, shortness of breath, wheezing and stridor.    Cardiovascular: Negative.  Negative for chest pain, palpitations, orthopnea, claudication, leg swelling and PND.   Gastrointestinal: Negative.    Genitourinary: Negative.    Musculoskeletal: Negative.    Skin: Negative.    Neurological: Negative.  Negative for dizziness, loss of consciousness and weakness.   Endo/Heme/Allergies: Negative.  Does not bruise/bleed easily.   All other systems reviewed and are negative.       Objective:    "  /74   Pulse 98   Ht 1.575 m (5' 2\")   Wt 59.4 kg (131 lb)   SpO2 93%   BMI 23.96 kg/m²      Physical Exam   Constitutional: She is oriented to person, place, and time. She appears well-developed and well-nourished. No distress.   HENT:   Head: Normocephalic.   Mouth/Throat: Oropharynx is clear and moist.   Eyes: EOM are normal. Pupils are equal, round, and reactive to light. Right eye exhibits no discharge. Left eye exhibits no discharge. No scleral icterus.   Neck: Normal range of motion. Neck supple. No JVD present. No tracheal deviation present.   Cardiovascular: Normal rate, regular rhythm, S1 normal, S2 normal, normal heart sounds, intact distal pulses and normal pulses.  Exam reveals no gallop, no S3, no S4 and no friction rub.    No murmur heard.   No systolic murmur is present    No diastolic murmur is present   Pulses:       Carotid pulses are 2+ on the right side, and 2+ on the left side.       Radial pulses are 2+ on the right side, and 2+ on the left side.        Dorsalis pedis pulses are 2+ on the right side, and 2+ on the left side.        Posterior tibial pulses are 2+ on the right side, and 2+ on the left side.   Pulmonary/Chest: Effort normal and breath sounds normal. No respiratory distress. She has no wheezes. She has no rales.   Abdominal: Soft. Bowel sounds are normal. She exhibits no distension and no mass. There is no tenderness. There is no rebound and no guarding.   Musculoskeletal: She exhibits no edema.   Neurological: She is alert and oriented to person, place, and time. No cranial nerve deficit.   Skin: Skin is warm and dry. She is not diaphoretic. No pallor.   Psychiatric: She has a normal mood and affect. Her behavior is normal. Judgment and thought content normal.   Nursing note and vitals reviewed.      Assessment:     1. Paroxysmal atrial fibrillation (CMS-HCC)     2. Chronic obstructive pulmonary disease, unspecified COPD type (CMS-HCC)     3. Cor pulmonale (CMS-HCC)   "   4. Essential hypertension         Medical Decision Making:  Today's Assessment / Status / Plan:     76-year-old female with cor pulmonale and diastolic dysfunction and permanent atrial fibrillation. No changes to her medical therapy today. I will see her back in one year. If she cannot get her labs from her primary care I did offer to put an order in the computer but she will let me know.    Thank for you allowing me to take part in your patient's care, please call should you have any questions or would like to discuss this patient.      Pastor Lopez M.D.  [address] [place] [address] [address]  [place] [address]  VIA Facsimile: [phone]

## 2017-09-15 ENCOUNTER — TELEPHONE (OUTPATIENT)
Dept: CARDIOLOGY | Facility: MEDICAL CENTER | Age: 77
End: 2017-09-15

## 2017-09-15 NOTE — TELEPHONE ENCOUNTER
potassium   Received: Today   Message Contents   Brooklyn Awad R.N.   Phone Number: [phone]             MARIE/shyla     Pt calling to let RO know she had CMP done by order of Dr Lopez on 8/18. RO wanted to know this.  She would like him to review the lab results.  She also wants MARIE to know she is taking a 90 day script of potassium 20 MEQ tablet, 1 per day, as prescribed by Dr Lopez on 8/18.     Please call if questions, [phone].

## 2017-10-18 ENCOUNTER — ANTICOAGULATION MONITORING (OUTPATIENT)
Dept: VASCULAR LAB | Facility: MEDICAL CENTER | Age: 77
End: 2017-10-18

## 2017-10-18 DIAGNOSIS — I48.0 PAROXYSMAL ATRIAL FIBRILLATION (HCC): ICD-10-CM

## 2017-10-18 NOTE — PROGRESS NOTES
Left voicemail message for [facility] f/u  Drug: Eliquis 5mg  Indication: atrial fibrillation    ClCr: >60ml/min  Hgb: 14.4    Continue same dose and recheck labs in 6 months.  Tatum Avalos, PharmD

## 2017-11-13 ENCOUNTER — HOSPITAL ENCOUNTER (OUTPATIENT)
Dept: LAB | Facility: MEDICAL CENTER | Age: 77
End: 2017-11-13
Attending: FAMILY MEDICINE
Payer: MEDICARE

## 2017-11-13 LAB
ALBUMIN SERPL BCP-MCNC: 2.9 G/DL (ref 3.2–4.9)
ALBUMIN/GLOB SERPL: 0.9 G/DL
ALP SERPL-CCNC: 67 U/L (ref 30–99)
ALT SERPL-CCNC: 21 U/L (ref 2–50)
ANION GAP SERPL CALC-SCNC: 11 MMOL/L (ref 0–11.9)
AST SERPL-CCNC: 56 U/L (ref 12–45)
BASOPHILS # BLD AUTO: 0.5 % (ref 0–1.8)
BASOPHILS # BLD: 0.02 K/UL (ref 0–0.12)
BILIRUB SERPL-MCNC: 2 MG/DL (ref 0.1–1.5)
BUN SERPL-MCNC: 11 MG/DL (ref 8–22)
CALCIUM SERPL-MCNC: 9.2 MG/DL (ref 8.5–10.5)
CHLORIDE SERPL-SCNC: 91 MMOL/L (ref 96–112)
CO2 SERPL-SCNC: 27 MMOL/L (ref 20–33)
CREAT SERPL-MCNC: 0.73 MG/DL (ref 0.5–1.4)
EOSINOPHIL # BLD AUTO: 0.14 K/UL (ref 0–0.51)
EOSINOPHIL NFR BLD: 3.5 % (ref 0–6.9)
ERYTHROCYTE [DISTWIDTH] IN BLOOD BY AUTOMATED COUNT: 46.7 FL (ref 35.9–50)
GFR SERPL CREATININE-BSD FRML MDRD: >60 ML/MIN/1.73 M 2
GLOBULIN SER CALC-MCNC: 3.1 G/DL (ref 1.9–3.5)
GLUCOSE SERPL-MCNC: 163 MG/DL (ref 65–99)
HCT VFR BLD AUTO: 24.7 % (ref 37–47)
HGB BLD-MCNC: 7.8 G/DL (ref 12–16)
IMM GRANULOCYTES # BLD AUTO: 0.03 K/UL (ref 0–0.11)
IMM GRANULOCYTES NFR BLD AUTO: 0.8 % (ref 0–0.9)
LYMPHOCYTES # BLD AUTO: 0.54 K/UL (ref 1–4.8)
LYMPHOCYTES NFR BLD: 13.7 % (ref 22–41)
MCH RBC QN AUTO: 29.3 PG (ref 27–33)
MCHC RBC AUTO-ENTMCNC: 31.6 G/DL (ref 33.6–35)
MCV RBC AUTO: 92.9 FL (ref 81.4–97.8)
MONOCYTES # BLD AUTO: 0.99 K/UL (ref 0–0.85)
MONOCYTES NFR BLD AUTO: 25.1 % (ref 0–13.4)
NEUTROPHILS # BLD AUTO: 2.23 K/UL (ref 2–7.15)
NEUTROPHILS NFR BLD: 56.4 % (ref 44–72)
NRBC # BLD AUTO: 0 K/UL
NRBC BLD AUTO-RTO: 0 /100 WBC
PLATELET # BLD AUTO: 178 K/UL (ref 164–446)
PMV BLD AUTO: 10.3 FL (ref 9–12.9)
POTASSIUM SERPL-SCNC: 3 MMOL/L (ref 3.6–5.5)
PROT SERPL-MCNC: 6 G/DL (ref 6–8.2)
RBC # BLD AUTO: 2.66 M/UL (ref 4.2–5.4)
SODIUM SERPL-SCNC: 129 MMOL/L (ref 135–145)
WBC # BLD AUTO: 4 K/UL (ref 4.8–10.8)

## 2017-11-13 PROCEDURE — 36415 COLL VENOUS BLD VENIPUNCTURE: CPT

## 2017-11-13 PROCEDURE — 85025 COMPLETE CBC W/AUTO DIFF WBC: CPT

## 2017-11-13 PROCEDURE — 82785 ASSAY OF IGE: CPT

## 2017-11-13 PROCEDURE — 80053 COMPREHEN METABOLIC PANEL: CPT

## 2017-11-14 LAB — IGE SERPL-ACNC: 1202 KU/L

## 2017-11-17 ENCOUNTER — RESOLUTE PROFESSIONAL BILLING HOSPITAL PROF FEE (OUTPATIENT)
Dept: HOSPITALIST | Facility: MEDICAL CENTER | Age: 77
End: 2017-11-17
Payer: MEDICARE

## 2017-11-17 ENCOUNTER — HOSPITAL ENCOUNTER (INPATIENT)
Facility: MEDICAL CENTER | Age: 77
LOS: 1 days | DRG: 812 | End: 2017-11-18
Attending: EMERGENCY MEDICINE | Admitting: INTERNAL MEDICINE
Payer: MEDICARE

## 2017-11-17 DIAGNOSIS — K92.2 GASTROINTESTINAL HEMORRHAGE, UNSPECIFIED GASTROINTESTINAL HEMORRHAGE TYPE: ICD-10-CM

## 2017-11-17 DIAGNOSIS — D64.9 ANEMIA, UNSPECIFIED TYPE: ICD-10-CM

## 2017-11-17 PROBLEM — D68.32 HEMORRHAGIC DISORDER DUE TO EXTRINSIC CIRCULATING ANTICOAGULANTS (HCC): Status: ACTIVE | Noted: 2017-11-17

## 2017-11-17 PROBLEM — R19.5 OCCULT GI BLEEDING: Status: ACTIVE | Noted: 2017-11-17

## 2017-11-17 LAB
ABO GROUP BLD: NORMAL
ABO GROUP BLD: NORMAL
ALBUMIN SERPL BCP-MCNC: 2.8 G/DL (ref 3.2–4.9)
ALBUMIN/GLOB SERPL: 0.9 G/DL
ALP SERPL-CCNC: 68 U/L (ref 30–99)
ALT SERPL-CCNC: 22 U/L (ref 2–50)
ANION GAP SERPL CALC-SCNC: 7 MMOL/L (ref 0–11.9)
APTT PPP: 37.4 SEC (ref 24.7–36)
AST SERPL-CCNC: 62 U/L (ref 12–45)
BARCODED ABORH UBTYP: 5100
BARCODED ABORH UBTYP: 9500
BARCODED PRD CODE UBPRD: NORMAL
BARCODED PRD CODE UBPRD: NORMAL
BARCODED UNIT NUM UBUNT: NORMAL
BARCODED UNIT NUM UBUNT: NORMAL
BASOPHILS # BLD AUTO: 1 % (ref 0–1.8)
BASOPHILS # BLD: 0.04 K/UL (ref 0–0.12)
BILIRUB SERPL-MCNC: 1.2 MG/DL (ref 0.1–1.5)
BLD GP AB SCN SERPL QL: NORMAL
BUN SERPL-MCNC: 9 MG/DL (ref 8–22)
CALCIUM SERPL-MCNC: 8.9 MG/DL (ref 8.5–10.5)
CHLORIDE SERPL-SCNC: 90 MMOL/L (ref 96–112)
CO2 SERPL-SCNC: 27 MMOL/L (ref 20–33)
COMPONENT R 8504R: NORMAL
COMPONENT R 8504R: NORMAL
CREAT SERPL-MCNC: 0.67 MG/DL (ref 0.5–1.4)
EOSINOPHIL # BLD AUTO: 0.1 K/UL (ref 0–0.51)
EOSINOPHIL NFR BLD: 2.6 % (ref 0–6.9)
ERYTHROCYTE [DISTWIDTH] IN BLOOD BY AUTOMATED COUNT: 44.7 FL (ref 35.9–50)
GFR SERPL CREATININE-BSD FRML MDRD: >60 ML/MIN/1.73 M 2
GLOBULIN SER CALC-MCNC: 3 G/DL (ref 1.9–3.5)
GLUCOSE SERPL-MCNC: 96 MG/DL (ref 65–99)
HCT VFR BLD AUTO: 23.4 % (ref 37–47)
HGB BLD-MCNC: 7.7 G/DL (ref 12–16)
IMM GRANULOCYTES # BLD AUTO: 0.02 K/UL (ref 0–0.11)
IMM GRANULOCYTES NFR BLD AUTO: 0.5 % (ref 0–0.9)
INR PPP: 1.88 (ref 0.87–1.13)
LYMPHOCYTES # BLD AUTO: 0.87 K/UL (ref 1–4.8)
LYMPHOCYTES NFR BLD: 22.3 % (ref 22–41)
MCH RBC QN AUTO: 29.2 PG (ref 27–33)
MCHC RBC AUTO-ENTMCNC: 32.9 G/DL (ref 33.6–35)
MCV RBC AUTO: 88.6 FL (ref 81.4–97.8)
MONOCYTES # BLD AUTO: 0.93 K/UL (ref 0–0.85)
MONOCYTES NFR BLD AUTO: 23.8 % (ref 0–13.4)
NEUTROPHILS # BLD AUTO: 1.94 K/UL (ref 2–7.15)
NEUTROPHILS NFR BLD: 49.8 % (ref 44–72)
NRBC # BLD AUTO: 0 K/UL
NRBC BLD AUTO-RTO: 0 /100 WBC
PLATELET # BLD AUTO: 190 K/UL (ref 164–446)
PMV BLD AUTO: 9.6 FL (ref 9–12.9)
POTASSIUM SERPL-SCNC: 3.6 MMOL/L (ref 3.6–5.5)
PRODUCT TYPE UPROD: NORMAL
PRODUCT TYPE UPROD: NORMAL
PROT SERPL-MCNC: 5.8 G/DL (ref 6–8.2)
PROTHROMBIN TIME: 21.3 SEC (ref 12–14.6)
RBC # BLD AUTO: 2.64 M/UL (ref 4.2–5.4)
RH BLD: NORMAL
SODIUM SERPL-SCNC: 124 MMOL/L (ref 135–145)
UNIT STATUS USTAT: NORMAL
UNIT STATUS USTAT: NORMAL
WBC # BLD AUTO: 3.9 K/UL (ref 4.8–10.8)

## 2017-11-17 PROCEDURE — 86850 RBC ANTIBODY SCREEN: CPT

## 2017-11-17 PROCEDURE — 99285 EMERGENCY DEPT VISIT HI MDM: CPT

## 2017-11-17 PROCEDURE — 86900 BLOOD TYPING SEROLOGIC ABO: CPT

## 2017-11-17 PROCEDURE — 700102 HCHG RX REV CODE 250 W/ 637 OVERRIDE(OP): Performed by: INTERNAL MEDICINE

## 2017-11-17 PROCEDURE — 86923 COMPATIBILITY TEST ELECTRIC: CPT | Mod: 91

## 2017-11-17 PROCEDURE — 99223 1ST HOSP IP/OBS HIGH 75: CPT | Performed by: INTERNAL MEDICINE

## 2017-11-17 PROCEDURE — 85610 PROTHROMBIN TIME: CPT

## 2017-11-17 PROCEDURE — P9016 RBC LEUKOCYTES REDUCED: HCPCS | Mod: 91

## 2017-11-17 PROCEDURE — 86901 BLOOD TYPING SEROLOGIC RH(D): CPT

## 2017-11-17 PROCEDURE — 85025 COMPLETE CBC W/AUTO DIFF WBC: CPT

## 2017-11-17 PROCEDURE — 30233N1 TRANSFUSION OF NONAUTOLOGOUS RED BLOOD CELLS INTO PERIPHERAL VEIN, PERCUTANEOUS APPROACH: ICD-10-PCS | Performed by: EMERGENCY MEDICINE

## 2017-11-17 PROCEDURE — 36430 TRANSFUSION BLD/BLD COMPNT: CPT

## 2017-11-17 PROCEDURE — A9270 NON-COVERED ITEM OR SERVICE: HCPCS | Performed by: INTERNAL MEDICINE

## 2017-11-17 PROCEDURE — 80053 COMPREHEN METABOLIC PANEL: CPT

## 2017-11-17 PROCEDURE — 770020 HCHG ROOM/CARE - TELE (206)

## 2017-11-17 PROCEDURE — 85730 THROMBOPLASTIN TIME PARTIAL: CPT

## 2017-11-17 PROCEDURE — 36415 COLL VENOUS BLD VENIPUNCTURE: CPT

## 2017-11-17 RX ORDER — ATENOLOL 50 MG/1
50 TABLET ORAL EVERY MORNING
Status: DISCONTINUED | OUTPATIENT
Start: 2017-11-18 | End: 2017-11-18 | Stop reason: HOSPADM

## 2017-11-17 RX ORDER — ONDANSETRON 4 MG/1
4 TABLET, ORALLY DISINTEGRATING ORAL EVERY 4 HOURS PRN
Status: DISCONTINUED | OUTPATIENT
Start: 2017-11-17 | End: 2017-11-18 | Stop reason: HOSPADM

## 2017-11-17 RX ORDER — ZOLPIDEM TARTRATE 5 MG/1
5 TABLET ORAL
Status: DISCONTINUED | OUTPATIENT
Start: 2017-11-17 | End: 2017-11-18 | Stop reason: HOSPADM

## 2017-11-17 RX ORDER — ACETAMINOPHEN 500 MG
1000 TABLET ORAL EVERY MORNING
COMMUNITY
End: 2018-04-22

## 2017-11-17 RX ORDER — ACETAMINOPHEN 325 MG/1
650 TABLET ORAL EVERY 6 HOURS PRN
Status: DISCONTINUED | OUTPATIENT
Start: 2017-11-17 | End: 2017-11-18 | Stop reason: HOSPADM

## 2017-11-17 RX ORDER — POLYETHYLENE GLYCOL 3350 17 G/17G
1 POWDER, FOR SOLUTION ORAL
Status: DISCONTINUED | OUTPATIENT
Start: 2017-11-17 | End: 2017-11-17

## 2017-11-17 RX ORDER — LOSARTAN POTASSIUM 25 MG/1
25 TABLET ORAL EVERY MORNING
Status: DISCONTINUED | OUTPATIENT
Start: 2017-11-18 | End: 2017-11-18 | Stop reason: HOSPADM

## 2017-11-17 RX ORDER — ACETAMINOPHEN 500 MG
1000 TABLET ORAL EVERY MORNING
Status: DISCONTINUED | OUTPATIENT
Start: 2017-11-18 | End: 2017-11-18 | Stop reason: HOSPADM

## 2017-11-17 RX ORDER — POTASSIUM CHLORIDE 750 MG/1
10 TABLET, FILM COATED, EXTENDED RELEASE ORAL EVERY MORNING
Status: DISCONTINUED | OUTPATIENT
Start: 2017-11-18 | End: 2017-11-18 | Stop reason: HOSPADM

## 2017-11-17 RX ORDER — TIOTROPIUM BROMIDE 18 UG/1
1 CAPSULE ORAL; RESPIRATORY (INHALATION) DAILY
Status: DISCONTINUED | OUTPATIENT
Start: 2017-11-18 | End: 2017-11-18 | Stop reason: HOSPADM

## 2017-11-17 RX ORDER — HYDROCHLOROTHIAZIDE 25 MG/1
12.5 TABLET ORAL EVERY MORNING
Status: DISCONTINUED | OUTPATIENT
Start: 2017-11-18 | End: 2017-11-18

## 2017-11-17 RX ORDER — ONDANSETRON 2 MG/ML
4 INJECTION INTRAMUSCULAR; INTRAVENOUS EVERY 4 HOURS PRN
Status: DISCONTINUED | OUTPATIENT
Start: 2017-11-17 | End: 2017-11-18 | Stop reason: HOSPADM

## 2017-11-17 RX ORDER — BUDESONIDE AND FORMOTEROL FUMARATE DIHYDRATE 160; 4.5 UG/1; UG/1
2 AEROSOL RESPIRATORY (INHALATION) 2 TIMES DAILY
Status: DISCONTINUED | OUTPATIENT
Start: 2017-11-17 | End: 2017-11-18 | Stop reason: HOSPADM

## 2017-11-17 RX ORDER — AMOXICILLIN 250 MG
2 CAPSULE ORAL 2 TIMES DAILY
Status: DISCONTINUED | OUTPATIENT
Start: 2017-11-17 | End: 2017-11-17

## 2017-11-17 RX ORDER — PHENOL 1.4 %
10 AEROSOL, SPRAY (ML) MUCOUS MEMBRANE
Status: DISCONTINUED | OUTPATIENT
Start: 2017-11-17 | End: 2017-11-17

## 2017-11-17 RX ORDER — BISACODYL 10 MG
10 SUPPOSITORY, RECTAL RECTAL
Status: DISCONTINUED | OUTPATIENT
Start: 2017-11-17 | End: 2017-11-17

## 2017-11-17 RX ORDER — OMEPRAZOLE 20 MG/1
20 CAPSULE, DELAYED RELEASE ORAL EVERY 12 HOURS
Status: DISCONTINUED | OUTPATIENT
Start: 2017-11-17 | End: 2017-11-18 | Stop reason: HOSPADM

## 2017-11-17 RX ADMIN — BUDESONIDE AND FORMOTEROL FUMARATE DIHYDRATE 2 PUFF: 160; 4.5 AEROSOL RESPIRATORY (INHALATION) at 23:58

## 2017-11-17 ASSESSMENT — PAIN SCALES - GENERAL: PAINLEVEL_OUTOF10: 0

## 2017-11-17 ASSESSMENT — COPD QUESTIONNAIRES
DO YOU EVER COUGH UP ANY MUCUS OR PHLEGM?: NO/ONLY WITH OCCASIONAL COLDS OR INFECTIONS
DURING THE PAST 4 WEEKS HOW MUCH DID YOU FEEL SHORT OF BREATH: SOME OF THE TIME
HAVE YOU SMOKED AT LEAST 100 CIGARETTES IN YOUR ENTIRE LIFE: YES
COPD SCREENING SCORE: 6

## 2017-11-17 ASSESSMENT — LIFESTYLE VARIABLES
TOTAL SCORE: 0
TOTAL SCORE: 0
CONSUMPTION TOTAL: POSITIVE
HOW MANY TIMES IN THE PAST YEAR HAVE YOU HAD 5 OR MORE DRINKS IN A DAY: 0
AVERAGE NUMBER OF DAYS PER WEEK YOU HAVE A DRINK CONTAINING ALCOHOL: 7
EVER FELT BAD OR GUILTY ABOUT YOUR DRINKING: NO
EVER_SMOKED: YES
EVER_SMOKED: YES
EVER HAD A DRINK FIRST THING IN THE MORNING TO STEADY YOUR NERVES TO GET RID OF A HANGOVER: NO
ALCOHOL_USE: YES
HAVE YOU EVER FELT YOU SHOULD CUT DOWN ON YOUR DRINKING: NO
ON A TYPICAL DAY WHEN YOU DRINK ALCOHOL HOW MANY DRINKS DO YOU HAVE: 2
HAVE PEOPLE ANNOYED YOU BY CRITICIZING YOUR DRINKING: NO
TOTAL SCORE: 0

## 2017-11-17 ASSESSMENT — PATIENT HEALTH QUESTIONNAIRE - PHQ9
SUM OF ALL RESPONSES TO PHQ QUESTIONS 1-9: 0
2. FEELING DOWN, DEPRESSED, IRRITABLE, OR HOPELESS: NOT AT ALL
SUM OF ALL RESPONSES TO PHQ9 QUESTIONS 1 AND 2: 0
1. LITTLE INTEREST OR PLEASURE IN DOING THINGS: NOT AT ALL

## 2017-11-17 NOTE — ED PROVIDER NOTES
ED Provider Note    Scribed for Dionicio Jenkins M.D. by Elyse Zavala. 11/17/2017  3:37 PM    Primary care provider: Pastor Lopez M.D.  Means of arrival: walk in   History obtained from: patient   History limited by: none       CHIEF COMPLAINT  Chief Complaint   Patient presents with   • Sent by MD     Sent by PCP for tranfusion   Anemia    HPI  Rox Valdez is a 76 y.o. female who presents to the Emergency Department for evaluation of abnormal labs including a hemoglobin of 7.8 from labs drawn 4 days ago with her primary care office. Patient complains of constant associated fatigue for the past week but she denies any weakness or black or bloody stool.  Patient reports no exacerbating or alleviating factors. She states she is normally active with her grandchildren. The patient is on Elliquis for her history of atrial fibrillation. Patient reports no other symptoms. She denies fever, chills, headache, lightheadedness, syncope, weight loss, palpitations, chest pain, nausea, vomiting, diarrhea, pain with urination and cough. Patient admits to having her last colonoscopy 30 years ago which indicated external hemorrhoids only.         REVIEW OF SYSTEMS  Pertinent positives include fatigue.   Pertinent negatives include no weakness, hematochezia, melena,  fever, chills, headache, lightheadedness, syncope, weight loss, palpitations, chest pain, nausea, vomiting, diarrhea, dysuria and cough.    All other systems reviewed and negative. C.       PAST MEDICAL HISTORY   has a past medical history of Atrial fibrillation (CMS-[facility]); COPD (chronic obstructive pulmonary disease) (CMS-[facility]); Cor pulmonale (CMS-[facility]); HTN (hypertension); Long term (current) use of anticoagulants; PVC (premature ventricular contraction); and Unspecified menopausal and postmenopausal disorder.      SURGICAL HISTORY   has a past surgical history that includes cataract extraction with iol; cholecystectomy; knee arthroscopy; and retinal  "detachment repair.      SOCIAL HISTORY  Social History   Substance Use Topics   • Smoking status: Former Smoker     Packs/day: 1.00     Years: 28.00     Types: Cigarettes     Start date: 1/1/1953     Quit date: 1/1/1981   • Smokeless tobacco: Never Used   • Alcohol use 3.0 - 6.0 oz/week     5 - 10 Standard drinks or equivalent per week      Comment: WINE      History   Drug use: Unknown       FAMILY HISTORY  Family History   Problem Relation Age of Onset   • Hypertension Mother    • Heart Disease Brother      Atrial Fibrillation.       CURRENT MEDICATIONS  Home Medications     Reviewed by Dorothy Prescott (Pharmacy Tech) on 11/17/17 at 1855  Med List Status: Complete   Medication Last Dose Status   acetaminophen (TYLENOL) 500 MG Tab 11/17/2017 Active   apixaban (ELIQUIS) 5mg Tab 11/17/2017 Active   atenolol (TENORMIN) 50 MG Tab 11/17/2017 Active   budesonide-formoterol (SYMBICORT) 160-4.5 MCG/ACT Aerosol 11/17/2017 Active   Cholecalciferol (VITAMIN D PO) 11/17/2017 Active   coenzyme Q-10 30 MG capsule 11/17/2017 Active   hydrochlorothiazide (HYDRODIURIL) 12.5 MG tablet 11/17/2017 Active   losartan (COZAAR) 25 MG Tab 11/17/2017 Active   MELATONIN PO 11/16/2017 Active   Omega-3 Fatty Acids (FISH OIL PO) 11/16/2017 Active   potassium chloride CR (K-DUR) 10 MEQ tablet 11/17/2017 Active   tiotropium (SPIRIVA HANDIHALER) 18 MCG Cap 11/17/2017 Active                ALLERGIES  No Known Allergies        PHYSICAL EXAM  VITAL SIGNS: /40   Pulse 80   Temp 36.2 °C (97.2 °F)   Resp 16   Ht 1.575 m (5' 2\")   Wt 62.3 kg (137 lb 5.6 oz)   SpO2 97%   BMI 25.12 kg/m²   Vital signs reviewed.  Constitutional:  Appears well-developed and well-nourished. No distress.   Head: Normocephalic.   Mouth/Throat: Oropharynx is clear and moist.   Eyes: EOM are normal. Pupils are equal, round, and reactive to light.   Cardiovascular: Normal rate, regular rhythm   Pulmonary/Chest: Effort normal and breath sounds normal. "   Abdominal: Soft. There is no tenderness.   Rectal: Scant stool that that is trace heme positive.   Musculoskeletal: Exhibits no edema.   Lymphadenopathy: No cervical adenopathy.   Neurological: Patient is alert and oriented to person, place, and time. CNs II - XII intact. DTRs intact. Normal sensation and strength.  Skin: Skin is warm and dry.   Psychiatric: Patient has a normal mood and affect. Behavior is normal.         LABS  Results for orders placed or performed during the hospital encounter of 11/17/17   CBC WITH DIFFERENTIAL   Result Value Ref Range    WBC 3.9 (L) 4.8 - 10.8 K/uL    RBC 2.64 (L) 4.20 - 5.40 M/uL    Hemoglobin 7.7 (L) 12.0 - 16.0 g/dL    Hematocrit 23.4 (L) 37.0 - 47.0 %    MCV 88.6 81.4 - 97.8 fL    MCH 29.2 27.0 - 33.0 pg    MCHC 32.9 (L) 33.6 - 35.0 g/dL    RDW 44.7 35.9 - 50.0 fL    Platelet Count 190 164 - 446 K/uL    MPV 9.6 9.0 - 12.9 fL    Neutrophils-Polys 49.80 44.00 - 72.00 %    Lymphocytes 22.30 22.00 - 41.00 %    Monocytes 23.80 (H) 0.00 - 13.40 %    Eosinophils 2.60 0.00 - 6.90 %    Basophils 1.00 0.00 - 1.80 %    Immature Granulocytes 0.50 0.00 - 0.90 %    Nucleated RBC 0.00 /100 WBC    Neutrophils (Absolute) 1.94 (L) 2.00 - 7.15 K/uL    Lymphs (Absolute) 0.87 (L) 1.00 - 4.80 K/uL    Monos (Absolute) 0.93 (H) 0.00 - 0.85 K/uL    Eos (Absolute) 0.10 0.00 - 0.51 K/uL    Baso (Absolute) 0.04 0.00 - 0.12 K/uL    Immature Granulocytes (abs) 0.02 0.00 - 0.11 K/uL    NRBC (Absolute) 0.00 K/uL   COMP METABOLIC PANEL   Result Value Ref Range    Sodium 124 (L) 135 - 145 mmol/L    Potassium 3.6 3.6 - 5.5 mmol/L    Chloride 90 (L) 96 - 112 mmol/L    Co2 27 20 - 33 mmol/L    Anion Gap 7.0 0.0 - 11.9    Glucose 96 65 - 99 mg/dL    Bun 9 8 - 22 mg/dL    Creatinine 0.67 0.50 - 1.40 mg/dL    Calcium 8.9 8.5 - 10.5 mg/dL    AST(SGOT) 62 (H) 12 - 45 U/L    ALT(SGPT) 22 2 - 50 U/L    Alkaline Phosphatase 68 30 - 99 U/L    Total Bilirubin 1.2 0.1 - 1.5 mg/dL    Albumin 2.8 (L) 3.2 - 4.9 g/dL     Total Protein 5.8 (L) 6.0 - 8.2 g/dL    Globulin 3.0 1.9 - 3.5 g/dL    A-G Ratio 0.9 g/dL   PROTHROMBIN TIME (INR)   Result Value Ref Range    PT 21.3 (H) 12.0 - 14.6 sec    INR 1.88 (H) 0.87 - 1.13   APTT   Result Value Ref Range    APTT 37.4 (H) 24.7 - 36.0 sec   COD (ADULT)   Result Value Ref Range    ABO Grouping Only O     Rh Grouping Only POS     Antibody Screen-Cod NEG     Component R       R3                  Red Blood Cells3    [id number]   transfused   11/17/17   17:52      Product Type Red Blood Cells LR Pheresis     Dispense Status Transfused     Unit Number (Barcoded) [id number]     Product Code (Barcoded) [id number]     Blood Type (Barcoded) 5100    ESTIMATED GFR   Result Value Ref Range    GFR If African American >60 >60 mL/min/1.73 m 2    GFR If Non African American >60 >60 mL/min/1.73 m 2   ABO CONFIRMATION   Result Value Ref Range    Second ABO Typing With Cod O    All labs reviewed by me.        COURSE & MEDICAL DECISION MAKING  Pertinent Labs & Imaging studies reviewed. (See chart for details) The patient's [facility] and past medical  records were reviewed    3:37 PM - Patient seen and examined at bedside. Ordered CBC, CMP, PTT, APTT to evaluate her symptoms.     4:50 PM Obtained and reviewed past medical records which indicated the patient was seen on 8/1/17 for hypokalemia. At that time her labs negative for anemia.     5:00 PM Performed rectal exam which indicated trace heme.     I Dionicio Jenkins M.D. certify that I have explained to the patient or the patient’s legal representative, the following:  (i)     Explained the Blood Transfusion procedure to be undertaken;  (ii)    Explained alternative treatments and their general nature and risks; and  (iii)   Explained the risks, and extent of risk, to the Blood Transfusion procedure.  Risks included, but are not limited to the following:  mild allergic reactions, hemolytic reaction, and possible exposure to infectious agents such as  hepatitis, cytomegalovirus, infectious mononucleosis, and acquired immune deficiency syndrome (AIDS)  I have explained all of the above and have answered all patient questions arising regarding the procedure to be taken, and I believe that the patient understands what I have explained.  Date 11/17/2017  Time of Consent 4:18 PM   This form is electronically signed by Dionicio Jenkins M.D.    5:56 PM Consult with [facility] family who agree to admit the patient.     7:10 PM Patient reevaluated at bedside. She is resting comfortably and is requesting to go home. After long discussion about the risks and benefits associated with admission the patient and her  agreed to admission to the hospitalist.     7:22 PM Paged hospitalist.     7:30 PM Consult with Dr. Howell, hospitalist, who agrees to admit the patient.       DISPOSITION:  Patient will be admitted to Dr. Howell in guarded condition.      FINAL IMPRESSION  1. Anemia, unspecified type    2. Gastrointestinal hemorrhage, unspecified gastrointestinal hemorrhage type         I, Elyse Zavala (Darren), am scribing for, and in the presence of, Dionicio Jenkins M.D..  Electronically signed by: Elyse Zavala (Darren), 11/17/2017  I, Dionicio Jenkins M.D. personally performed the services described in this documentation, as scribed by Elyse Zavala in my presence, and it is both accurate and complete.    The note accurately reflects work and decisions made by me.  Dionicio Jenkins  11/17/2017  8:55 PM

## 2017-11-17 NOTE — ED NOTES
".  Chief Complaint   Patient presents with   • Sent by MD     Sent by PCP for tranfusion     ./53   Pulse 89   Temp 36.2 °C (97.2 °F)   Resp 16   Ht 1.575 m (5' 2\")   Wt 62.3 kg (137 lb 5.6 oz)   SpO2 95%   BMI 25.12 kg/m²     Ambulatory to triage with family member, sent by PCP for \"low blood count\", Hgb 7.8, Hct 24.7, patient reports being \"tired all the time\" otherwise asymptomatic, educated on triage process, placed in lobby, told to inform staff of any changes in condition.    "

## 2017-11-18 ENCOUNTER — PATIENT OUTREACH (OUTPATIENT)
Dept: HEALTH INFORMATION MANAGEMENT | Facility: OTHER | Age: 77
End: 2017-11-18

## 2017-11-18 VITALS
OXYGEN SATURATION: 91 % | BODY MASS INDEX: 25.56 KG/M2 | SYSTOLIC BLOOD PRESSURE: 115 MMHG | RESPIRATION RATE: 18 BRPM | WEIGHT: 138.89 LBS | HEART RATE: 88 BPM | HEIGHT: 62 IN | TEMPERATURE: 97.6 F | DIASTOLIC BLOOD PRESSURE: 97 MMHG

## 2017-11-18 LAB
ANION GAP SERPL CALC-SCNC: 8 MMOL/L (ref 0–11.9)
BASOPHILS # BLD AUTO: 0.5 % (ref 0–1.8)
BASOPHILS # BLD: 0.02 K/UL (ref 0–0.12)
BUN SERPL-MCNC: 9 MG/DL (ref 8–22)
CALCIUM SERPL-MCNC: 8.3 MG/DL (ref 8.5–10.5)
CHLORIDE SERPL-SCNC: 95 MMOL/L (ref 96–112)
CO2 SERPL-SCNC: 26 MMOL/L (ref 20–33)
CREAT SERPL-MCNC: 0.69 MG/DL (ref 0.5–1.4)
EOSINOPHIL # BLD AUTO: 0.1 K/UL (ref 0–0.51)
EOSINOPHIL NFR BLD: 2.6 % (ref 0–6.9)
ERYTHROCYTE [DISTWIDTH] IN BLOOD BY AUTOMATED COUNT: 47.7 FL (ref 35.9–50)
GFR SERPL CREATININE-BSD FRML MDRD: >60 ML/MIN/1.73 M 2
GLUCOSE SERPL-MCNC: 93 MG/DL (ref 65–99)
HCT VFR BLD AUTO: 27.9 % (ref 37–47)
HGB BLD-MCNC: 9 G/DL (ref 12–16)
IMM GRANULOCYTES # BLD AUTO: 0.01 K/UL (ref 0–0.11)
IMM GRANULOCYTES NFR BLD AUTO: 0.3 % (ref 0–0.9)
INR PPP: 1.62 (ref 0.87–1.13)
LYMPHOCYTES # BLD AUTO: 0.72 K/UL (ref 1–4.8)
LYMPHOCYTES NFR BLD: 19 % (ref 22–41)
MCH RBC QN AUTO: 28.2 PG (ref 27–33)
MCHC RBC AUTO-ENTMCNC: 32.3 G/DL (ref 33.6–35)
MCV RBC AUTO: 87.5 FL (ref 81.4–97.8)
MONOCYTES # BLD AUTO: 0.74 K/UL (ref 0–0.85)
MONOCYTES NFR BLD AUTO: 19.6 % (ref 0–13.4)
NEUTROPHILS # BLD AUTO: 2.19 K/UL (ref 2–7.15)
NEUTROPHILS NFR BLD: 58 % (ref 44–72)
NRBC # BLD AUTO: 0 K/UL
NRBC BLD AUTO-RTO: 0 /100 WBC
PLATELET # BLD AUTO: 149 K/UL (ref 164–446)
PMV BLD AUTO: 9.3 FL (ref 9–12.9)
POTASSIUM SERPL-SCNC: 3.3 MMOL/L (ref 3.6–5.5)
PROTHROMBIN TIME: 18.9 SEC (ref 12–14.6)
RBC # BLD AUTO: 3.19 M/UL (ref 4.2–5.4)
SODIUM SERPL-SCNC: 129 MMOL/L (ref 135–145)
WBC # BLD AUTO: 3.8 K/UL (ref 4.8–10.8)

## 2017-11-18 PROCEDURE — A9270 NON-COVERED ITEM OR SERVICE: HCPCS | Performed by: INTERNAL MEDICINE

## 2017-11-18 PROCEDURE — 90732 PPSV23 VACC 2 YRS+ SUBQ/IM: CPT | Performed by: INTERNAL MEDICINE

## 2017-11-18 PROCEDURE — 99239 HOSP IP/OBS DSCHRG MGMT >30: CPT | Performed by: HOSPITALIST

## 2017-11-18 PROCEDURE — 90471 IMMUNIZATION ADMIN: CPT

## 2017-11-18 PROCEDURE — 700102 HCHG RX REV CODE 250 W/ 637 OVERRIDE(OP): Performed by: INTERNAL MEDICINE

## 2017-11-18 PROCEDURE — 3E0234Z INTRODUCTION OF SERUM, TOXOID AND VACCINE INTO MUSCLE, PERCUTANEOUS APPROACH: ICD-10-PCS | Performed by: INTERNAL MEDICINE

## 2017-11-18 PROCEDURE — 85610 PROTHROMBIN TIME: CPT

## 2017-11-18 PROCEDURE — 85025 COMPLETE CBC W/AUTO DIFF WBC: CPT

## 2017-11-18 PROCEDURE — 36415 COLL VENOUS BLD VENIPUNCTURE: CPT

## 2017-11-18 PROCEDURE — 700111 HCHG RX REV CODE 636 W/ 250 OVERRIDE (IP): Performed by: INTERNAL MEDICINE

## 2017-11-18 PROCEDURE — 80048 BASIC METABOLIC PNL TOTAL CA: CPT

## 2017-11-18 RX ORDER — SODIUM CHLORIDE 1 G/1
1 TABLET ORAL
Status: DISCONTINUED | OUTPATIENT
Start: 2017-11-18 | End: 2017-11-18 | Stop reason: HOSPADM

## 2017-11-18 RX ADMIN — ATENOLOL 50 MG: 50 TABLET ORAL at 08:37

## 2017-11-18 RX ADMIN — TIOTROPIUM BROMIDE 1 CAPSULE: 18 CAPSULE ORAL; RESPIRATORY (INHALATION) at 08:36

## 2017-11-18 RX ADMIN — Medication 30 MG: at 08:37

## 2017-11-18 RX ADMIN — BUDESONIDE AND FORMOTEROL FUMARATE DIHYDRATE 2 PUFF: 160; 4.5 AEROSOL RESPIRATORY (INHALATION) at 08:35

## 2017-11-18 RX ADMIN — VITAMIN D, TAB 1000IU (100/BT) 1000 UNITS: 25 TAB at 08:37

## 2017-11-18 RX ADMIN — ACETAMINOPHEN 1000 MG: 500 TABLET ORAL at 08:37

## 2017-11-18 RX ADMIN — HYDROCHLOROTHIAZIDE 12.5 MG: 25 TABLET ORAL at 08:37

## 2017-11-18 RX ADMIN — PNEUMOCOCCAL VACCINE POLYVALENT 25 MCG
25; 25; 25; 25; 25; 25; 25; 25; 25; 25; 25; 25; 25; 25; 25; 25; 25; 25; 25; 25; 25; 25; 25 INJECTION, SOLUTION INTRAMUSCULAR; SUBCUTANEOUS at 06:36

## 2017-11-18 RX ADMIN — OMEPRAZOLE 20 MG: 20 CAPSULE, DELAYED RELEASE ORAL at 08:37

## 2017-11-18 RX ADMIN — LOSARTAN POTASSIUM 25 MG: 25 TABLET, FILM COATED ORAL at 08:37

## 2017-11-18 RX ADMIN — POTASSIUM CHLORIDE 10 MEQ: 750 TABLET, FILM COATED, EXTENDED RELEASE ORAL at 08:37

## 2017-11-18 RX ADMIN — ZOLPIDEM TARTRATE 5 MG: 5 TABLET, FILM COATED ORAL at 00:22

## 2017-11-18 ASSESSMENT — PAIN SCALES - GENERAL
PAINLEVEL_OUTOF10: 0

## 2017-11-18 ASSESSMENT — ENCOUNTER SYMPTOMS
NAUSEA: 0
DIARRHEA: 0
DIZZINESS: 0
HEARTBURN: 0
ABDOMINAL PAIN: 0
SHORTNESS OF BREATH: 1
PALPITATIONS: 0
BLOOD IN STOOL: 1
VOMITING: 0

## 2017-11-18 ASSESSMENT — LIFESTYLE VARIABLES: EVER_SMOKED: YES

## 2017-11-18 NOTE — ED NOTES
RBC infusing now, dual RN sign- off obtained. Verified blood transfusion consent signed and placed in chart.

## 2017-11-18 NOTE — PROGRESS NOTES
POC updated with MD and patient. Patient able to DC. Dr. Bañuelos's office will call her Monday to establish new patient appointment and outpatient colonoscopy and EGD. Patient's  to arrive to pick her up.

## 2017-11-18 NOTE — PROGRESS NOTES
Report received from nightshift RN. Patient resting in bed, ambulated to bathroom with standby assistance. Mild bleeding from hemorrhoids per patient. Denies dizziness, pain, or shortness of breath. Updated patient to POC, no needs at this time.

## 2017-11-18 NOTE — PROGRESS NOTES
Discharge instructions reviewed with patient and . IV removed, catheter tip intact. Tele box discontinued. Patient instructed to follow up with PCP and GI, [facility] will call patient Monday 11/20. All questions answered at this time. Patient taken to private vehicle in wheelchair with hospital staff.

## 2017-11-18 NOTE — ED NOTES
Pt requesting something to eat and drink. Dr Jenkins notified and verified pt okay to eat. Pt given box meal and water at bedside.

## 2017-11-18 NOTE — CARE PLAN
Problem: Knowledge Deficit  Goal: Knowledge of disease process/condition, treatment plan, diagnostic tests, and medications will improve  Outcome: PROGRESSING AS EXPECTED  Discuss importance of monitoring hemoglobin, reporting S/S of shortness of breath, lightheadedness, etc/     Problem: Discharge Barriers/Planning  Goal: Patient's continuum of care needs will be met    Intervention: Assess potential discharge barriers on admission and throughout hospital stay  Assess discharge planning needs, ensure patient has safe means for arriving home

## 2017-11-18 NOTE — ASSESSMENT & PLAN NOTE
Patient's hemoglobin was not at a level which required transfusion upon presentation- however ER physician did order blood work at the request of the primary care physician and it was already being given at the time I saw the patient  She does not appear to be acutely bleeding however, we will get a follow-up CBC in the morning  GI will be consulted- the question is the safety of continuing her on her anticoagulant at this time  For the time being will be held

## 2017-11-18 NOTE — DISCHARGE INSTRUCTIONS
Discharge Instructions    Discharged to home by car with relative. Discharged via wheelchair, hospital escort: Yes.  Special equipment needed: Not Applicable    Be sure to schedule a follow-up appointment with your primary care doctor or any specialists as instructed.     Discharge Plan:   Diet Plan: Discussed (cardiac)  Activity Level: Discussed (as tolerated)  Confirmed Follow up Appointment: Patient to Call and Schedule Appointment (with PCP and GI)  Confirmed Symptoms Management: Discussed  Medication Reconciliation Updated: Yes  Pneumococcal Vaccine Given - only chart on this line when given: Given (See MAR)  Influenza Vaccine Indication: Patient Refuses    I understand that a diet low in cholesterol, fat, and sodium is recommended for good health. Unless I have been given specific instructions below for another diet, I accept this instruction as my diet prescription.   Other diet: Cardiac    Special Instructions: None    · Is patient discharged on Warfarin / Coumadin?   No     · Is patient Post Blood Transfusion?      Yes  POST BLOOD TRANSFUSION INFORMATION (ADULT)    The purpose of blood transfusion is to correct anemia, low levels of blood clotting factors or to correct acute blood loss.   Blood transfusion is very safe but occasionally unexpected adverse reactions do occur. Most adverse reactions occur during transfusion, within one to two days following transfusion or one to two weeks following transfusion. Some adverse reactions can occur one to six months after transfusion and some even years later.             If any of the symptoms listed below happen to you during your transfusion,                                 please notify your nurse immediately.   · Fever or Chills  · Flushing of the Face  · Hives, rash or itching  · Difficulty in breathing or shortness of breath  · Pain or oozing of blood from the IV needle site  · Low back pain  · Nausea or vomiting  · Weakness or fainting  · Chest  pain  · Blood in the urine  · Decreased frequency of urination    The above symptoms may also occur within 24 to 48 after transfusion; if so, notify your physician.     · Yellowing of the skin    This can happen one to six months after transfusion; if so, notify your physician      Gastrointestinal Bleeding  Gastrointestinal (GI) bleeding means there is bleeding somewhere along the digestive tract, between the mouth and anus.  CAUSES   There are many different problems that can cause GI bleeding. Possible causes include:  · Esophagitis. This is inflammation, irritation, or swelling of the esophagus.  · Hemorrhoids. These are veins that are full of blood (engorged) in the rectum. They cause pain, inflammation, and may bleed.  · Anal fissures. These are areas of painful tearing which may bleed. They are often caused by passing hard stool.  · Diverticulosis. These are pouches that form on the colon over time, with age, and may bleed significantly.  · Diverticulitis. This is inflammation in areas with diverticulosis. It can cause pain, fever, and bloody stools, although bleeding is rare.  · Polyps and cancer. Colon cancer often starts out as precancerous polyps.  · Gastritis and ulcers. Bleeding from the upper gastrointestinal tract (near the stomach) may travel through the intestines and produce black, sometimes tarry, often bad smelling stools. In certain cases, if the bleeding is fast enough, the stools may not be black, but red. This condition may be life-threatening.  SYMPTOMS   · Vomiting bright red blood or material that looks like coffee grounds.  · Bloody, black, or tarry stools.  DIAGNOSIS   Your caregiver may diagnose your condition by taking your history and performing a physical exam. More tests may be needed, including:  · X-rays and other imaging tests.  · Esophagogastroduodenoscopy (EGD). This test uses a flexible, lighted tube to look at your esophagus, stomach, and small intestine.  · Colonoscopy. This  test uses a flexible, lighted tube to look at your colon.  TREATMENT   Treatment depends on the cause of your bleeding.   · For bleeding from the esophagus, stomach, small intestine, or colon, the caregiver doing your EGD or colonoscopy may be able to stop the bleeding as part of the procedure.  · Inflammation or infection of the colon can be treated with medicines.  · Many rectal problems can be treated with creams, suppositories, or warm baths.  · Surgery is sometimes needed.  · Blood transfusions are sometimes needed if you have lost a lot of blood.  If bleeding is slow, you may be allowed to go home. If there is a lot of bleeding, you will need to stay in the hospital for observation.  HOME CARE INSTRUCTIONS   · Take any medicines exactly as prescribed.  · Keep your stools soft by eating foods that are high in fiber. These foods include whole grains, legumes, fruits, and vegetables. Prunes (1 to 3 a day) work well for many people.  · Drink enough fluids to keep your urine clear or pale yellow.  SEEK IMMEDIATE MEDICAL CARE IF:   · Your bleeding increases.  · You feel lightheaded, weak, or you faint.  · You have severe cramps in your back or abdomen.  · You pass large blood clots in your stool.  · Your problems are getting worse.  MAKE SURE YOU:   · Understand these instructions.  · Will watch your condition.  · Will get help right away if you are not doing well or get worse.     This information is not intended to replace advice given to you by your health care provider. Make sure you discuss any questions you have with your health care provider.     Document Released: 12/15/2001 Document Revised: 12/04/2013 Document Reviewed: 11/26/2012  [employer] Interactive Patient Education ©2016 [employer]    Anemia, Nonspecific  Anemia is a condition in which the concentration of red blood cells or hemoglobin in the blood is below normal. Hemoglobin is a substance in red blood cells that carries oxygen to the tissues of  the body. Anemia results in not enough oxygen reaching these tissues.   CAUSES   Common causes of anemia include:   · Excessive bleeding. Bleeding may be internal or external. This includes excessive bleeding from periods (in women) or from the intestine.    · Poor nutrition.    · Chronic kidney, thyroid, and liver disease.   · Bone marrow disorders that decrease red blood cell production.  · Cancer and treatments for cancer.  · HIV, AIDS, and their treatments.  · Spleen problems that increase red blood cell destruction.  · Blood disorders.  · Excess destruction of red blood cells due to infection, medicines, and autoimmune disorders.  SIGNS AND SYMPTOMS   · Minor weakness.    · Dizziness.    · Headache.  · Palpitations.    · Shortness of breath, especially with exercise.    · Paleness.  · Cold sensitivity.  · Indigestion.  · Nausea.  · Difficulty sleeping.  · Difficulty concentrating.  Symptoms may occur suddenly or they may develop slowly.   DIAGNOSIS   Additional blood tests are often needed. These help your health care provider determine the best treatment. Your health care provider will check your stool for blood and look for other causes of blood loss.   TREATMENT   Treatment varies depending on the cause of the anemia. Treatment can include:   · Supplements of iron, vitamin B12, or folic acid.    · Hormone medicines.    · A blood transfusion. This may be needed if blood loss is severe.    · Hospitalization. This may be needed if there is significant continual blood loss.    · Dietary changes.  · Spleen removal.  HOME CARE INSTRUCTIONS  Keep all follow-up appointments. It often takes many weeks to correct anemia, and having your health care provider check on your condition and your response to treatment is very important.  SEEK IMMEDIATE MEDICAL CARE IF:   · You develop extreme weakness, shortness of breath, or chest pain.    · You become dizzy or have trouble concentrating.  · You develop heavy vaginal  bleeding.    · You develop a rash.    · You have bloody or black, tarry stools.    · You faint.    · You vomit up blood.    · You vomit repeatedly.    · You have abdominal pain.  · You have a fever or persistent symptoms for more than 2-3 days.    · You have a fever and your symptoms suddenly get worse.    · You are dehydrated.    MAKE SURE YOU:  · Understand these instructions.  · Will watch your condition.  · Will get help right away if you are not doing well or get worse.     This information is not intended to replace advice given to you by your health care provider. Make sure you discuss any questions you have with your health care provider.     Document Released: 01/25/2006 Document Revised: 08/20/2014 Document Reviewed: 06/13/2014  [employer] Interactive Patient Education ©2016 [employer]        Depression / Suicide Risk    As you are discharged from this [facility] facility, it is important to learn how to keep safe from harming yourself.    Recognize the warning signs:  · Abrupt changes in personality, positive or negative- including increase in energy   · Giving away possessions  · Change in eating patterns- significant weight changes-  positive or negative  · Change in sleeping patterns- unable to sleep or sleeping all the time   · Unwillingness or inability to communicate  · Depression  · Unusual sadness, discouragement and loneliness  · Talk of wanting to die  · Neglect of personal appearance   · Rebelliousness- reckless behavior  · Withdrawal from people/activities they love  · Confusion- inability to concentrate     If you or a loved one observes any of these behaviors or has concerns about self-harm, here's what you can do:  · Talk about it- your feelings and reasons for harming yourself  · Remove any means that you might use to hurt yourself (examples: pills, rope, extension cords, firearm)  · Get professional help from the community (Mental Health, Substance Abuse, psychological  counseling)  · Do not be alone:Call your Safe Contact- someone whom you trust who will be there for you.  · Call your local CRISIS HOTLINE 932-0505 or 651-103-3656  · Call your local Children's Mobile Crisis Response Team [place] (786) 332-9883 or www.[employer]  · Call the toll free National Suicide Prevention Hotlines   · National Suicide Prevention Lifeline 049-267-MTKV (5688)  · National Hope Line Network 800-SUICIDE (490-3601)

## 2017-11-18 NOTE — H&P
Hospital Medicine History and Physical    Date of Service  11/17/2017    Chief Complaint  Chief Complaint   Patient presents with   • Sent by MD     Sent by PCP for tranfusion       History of Presenting Illness  76 y.o. female who Called her primary care physician on Monday because she was feeling itchy on the upper half of her body, & wanted lab work done- this was drawn on Monday. They called her today to say she was anemic and told her to come to the hospital for transfusion . She's noted some mild fatigue but otherwise is asymptomatic . She denies chest pain shortness of breath or dizziness . She says she gets occasional bright red blood in her stool which she attributes to external hemorrhoids but it's not a significant amount. One unit of packed red cells was ordered by the ER physician and is transfusing at the time I'm seeing the patient . Her hemoglobin however was 7.8 on 13th, and 7.7 today. She is chronically anticoagulated on eliquis for atrial fibrillation. She denies any heartburn or dyspepsia.   Primary Care Physician  Pastor Lopez M.D.    Consultants  Call GI in the AM.    Code Status  Full    Review of Systems  Review of Systems   Constitutional: Positive for malaise/fatigue.   Respiratory: Positive for shortness of breath (chronic and unchanged secondary to COPD).    Cardiovascular: Negative for chest pain and palpitations.   Gastrointestinal: Positive for blood in stool (occasional which she attributes to hemorrhoids but it's not a significant amount). Negative for abdominal pain, diarrhea, heartburn, melena, nausea and vomiting.   Genitourinary: Negative for dysuria.   Skin: Positive for itching.   Neurological: Negative for dizziness.        Past Medical History  Past Medical History:   Diagnosis Date   • Atrial fibrillation (CMS-[facility])    • COPD (chronic obstructive pulmonary disease) (CMS-[facility])    • Cor pulmonale (CMS-[facility])    • HTN (hypertension)    • Long term (current) use of  anticoagulants    • PVC (premature ventricular contraction)    • Unspecified menopausal and postmenopausal disorder        Surgical History  Past Surgical History:   Procedure Laterality Date   • CATARACT EXTRACTION WITH IOL     • CHOLECYSTECTOMY      LAPAROSCOPIC.   • KNEE ARTHROSCOPY     • RETINAL DETACHMENT REPAIR         Medications  No current facility-administered medications on file prior to encounter.      Current Outpatient Prescriptions on File Prior to Encounter   Medication Sig Dispense Refill   • apixaban (ELIQUIS) 5mg Tab Take 1 Tab by mouth 2 Times a Day. 180 Tab 0   • budesonide-formoterol (SYMBICORT) 160-4.5 MCG/ACT Aerosol Inhale 2 Puffs by mouth 2 Times a Day. 1 Inhaler 11   • tiotropium (SPIRIVA HANDIHALER) 18 MCG Cap Use 1 capsule inhaled from handihaler every day. 90 Cap 3   • hydrochlorothiazide (HYDRODIURIL) 12.5 MG tablet Take 12.5 mg by mouth every morning.     • losartan (COZAAR) 25 MG Tab Take 25 mg by mouth every morning.     • atenolol (TENORMIN) 50 MG Tab Take 50 mg by mouth every morning.     • Omega-3 Fatty Acids (FISH OIL PO) Take 1 Cap by mouth See Admin Instructions. Pt takes twice per week -  and      • potassium chloride CR (K-DUR) 10 MEQ tablet Take 10 mEq by mouth every morning.         Family History  Family History   Problem Relation Age of Onset   • Hypertension Mother    • Heart Disease Brother      Atrial Fibrillation.       Social History  Social History   Substance Use Topics   • Smoking status: Former Smoker     Packs/day: 1.00     Years: 28.00     Types: Cigarettes     Start date: 1953     Quit date: 1981   • Smokeless tobacco: Never Used   • Alcohol use 3.0 - 6.0 oz/week     5 - 10 Standard drinks or equivalent per week      Comment: WINE       Allergies  No Known Allergies     Physical Exam  Laboratory   Hemodynamics  Temp (24hrs), Av.7 °C (98 °F), Min:36.2 °C (97.2 °F), Max:37 °C (98.6 °F)   Temperature: 36.6 °C (97.9 °F)  Pulse  Avg:  90.3  Min: 80  Max: 112 Heart Rate (Monitored): 82  Blood Pressure : 142/80, NIBP: 127/66      Respiratory      Respiration: 16, Pulse Oximetry: 96 %, O2 Daily Delivery Respiratory : Room Air with O2 Available     Work Of Breathing / Effort: Mild  RUL Breath Sounds: Clear, RML Breath Sounds: Clear, RLL Breath Sounds: Diminished, ERICK Breath Sounds: Clear, LLL Breath Sounds: Diminished    Physical Exam   Constitutional: She is oriented to person, place, and time. She appears well-developed and well-nourished. No distress.   HENT:   Head: Normocephalic and atraumatic.   Mouth/Throat: Oropharynx is clear and moist.   Eyes: EOM are normal. Pupils are equal, round, and reactive to light. Right eye exhibits no discharge.   Pale conjunctivae   Neck: Neck supple.   Cardiovascular: Normal rate.    Murmur heard.  Irregularity irregular   Pulmonary/Chest: No respiratory distress. She has no wheezes. She has no rales.   Decreased breath sounds but clear   Abdominal: Soft. Bowel sounds are normal. She exhibits no distension. There is no tenderness.   Musculoskeletal: Normal range of motion. She exhibits no edema or tenderness.   Neurological: She is alert and oriented to person, place, and time. No cranial nerve deficit.   Skin: No rash noted. She is not diaphoretic. There is pallor (mild).   Nursing note and vitals reviewed.      Recent Labs      11/17/17   1624   WBC  3.9*   RBC  2.64*   HEMOGLOBIN  7.7*   HEMATOCRIT  23.4*   MCV  88.6   MCH  29.2   MCHC  32.9*   RDW  44.7   PLATELETCT  190   MPV  9.6     Recent Labs      11/17/17   1624   SODIUM  124*   POTASSIUM  3.6   CHLORIDE  90*   CO2  27   GLUCOSE  96   BUN  9   CREATININE  0.67   CALCIUM  8.9     Recent Labs      11/17/17   1624   ALTSGPT  22   ASTSGOT  62*   ALKPHOSPHAT  68   TBILIRUBIN  1.2   GLUCOSE  96     Recent Labs      11/17/17   1624   APTT  37.4*   INR  1.88*             No results found for: TROPONINI  Urinalysis:  No results found for: SPECGRAVITY,  GLUCOSEUR, KETONES, NITRITE, WBCURINE, RBCURINE, BACTERIA, EPITHELCELL     Imaging  none   Assessment/Plan     I anticipate this patient will require at least two midnights for appropriate medical management, necessitating inpatient admission.    Hemorrhagic disorder due to extrinsic circulating anticoagulants (CMS-[facility])   Assessment & Plan    Hold eliquis        Occult GI bleeding- (present on admission)   Assessment & Plan    Patient's hemoglobin was not at a level which required transfusion upon presentation- however ER physician did order blood work at the request of the primary care physician and it was already being given at the time I saw the patient  She does not appear to be acutely bleeding however, we will get a follow-up CBC in the morning  GI will be consulted- the question is the safety of continuing her on her anticoagulant at this time  For the time being will be held        COPD (chronic obstructive pulmonary disease) (CMS-[facility])- (present on admission)   Assessment & Plan    No exacerbation        Atrial fibrillation (CMS-HCC)- (present on admission)   Assessment & Plan    Rate controlled            VTE prophylaxis: SCD.

## 2017-11-18 NOTE — PROGRESS NOTES
Pts hemoglobin was 7.7, in ED orders stated less than 8 transfuse, but no current active orders, per MD no transfusion needed at this time.   When pt went to bathroom, there was blood on tissue, per pt it is from long standing hemorrhoids and is a common occurrence and nothing to be alerted about, updated MD about this as well.

## 2017-11-18 NOTE — ED NOTES
Pt assisted up to bedside commode.   RBC remain infusing at this time. No transfusion reaction noted at this time, VSS.

## 2017-11-18 NOTE — PROGRESS NOTES
2 RN skin check complete.   Pts has patch of dry skin on left shin, otherwise skin is C/D/I. No signs of skin breakdown noted.

## 2017-11-18 NOTE — PROGRESS NOTES
Pt up to floor from ED, VSS and assessment complete with no signs of distress, no sob or chest pain, pt and  updated on POC. PT oriented to unit, educated on how to call for assistance. Bed in lowest position, call light within reach, hourly rounding in place, treaded socks on. No signs of transfusion reaction noted.

## 2017-11-19 NOTE — DISCHARGE SUMMARY
CHIEF COMPLAINT ON ADMISSION  Chief Complaint   Patient presents with   • Sent by MD     Sent by PCP for tranfusion       CODE STATUS  Prior    HPI & HOSPITAL COURSE  This is a 76 y.o. female here with anemia. She was found to have a hemoglobin of 7.7 at her primary care physician's office. She was getting laboratory workup for itching of unknown cause. She has not had any rashes skin outbreaks or liver issues that would cause the itching. She stated that she has felt slightly fatigued. She has not had any melena and no abdominal pain and occasionally has some red blood on the toilet paper or in the toilet which she says has been happening for many years due to external hemorrhoids. She has been on Eliquis for anticoagulation for about one year due to her atrial fibrillation that is chronic for her. 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 The patient received 2 units of packed red blood cells and her hemoglobin is up to 9. I discussed the situation with Dr. aDny Bañuelos, and we determined that it would be most appropriate for the patient to be discharged and follow-up as an outpatient being off the Eliquis for at least a week prior to any intervention. The patient has never had a stroke and thus is safe to be off anticoagulation for the time being while workup is being accomplished.     Therefore she has recovered from her condition in a much faster than expected fashion only needing 1 midnight for inpatient treatment.    Therefore, she is discharged in good and stable condition with close outpatient follow-up.    SPECIFIC OUTPATIENT FOLLOW-UP  [facility] will call the patient on the next business day. She will be scheduled for outpatient elective EGD and colonoscopy.  Follow-up with cardiology, patient will need to be risk stratified and placed back on anticoagulation or at least aspirin at their discretion.  She is followed by Dr. Kin Shelley.    DISCHARGE PROBLEM LIST  Active Problems:    Atrial fibrillation (CMS-HCC) (Chronic) POA: Yes    COPD (chronic obstructive pulmonary disease) (CMS-HCC) (Chronic) POA: Yes    Occult GI bleeding POA: Yes    Hemorrhagic disorder due to extrinsic circulating anticoagulants (CMS-HCC) POA: Unknown  Resolved Problems:    * No resolved hospital problems. *      FOLLOW UP  No future appointments.  [facility]  [address]  [facility] [address] [phone]  In 2 days  For colonoscopy and     Pastor Lopez M.D.  [address] [facility] [address]  [address]  [facility] [address]  [phone]    In 1 week  follow up of symptoms      MEDICATIONS ON DISCHARGE   José Miguel Roxtaras Azul   Home Medication Instructions ANDRESSA:[id number]    Printed on:11/19/17 0745   Medication Information                      acetaminophen (TYLENOL) 500 MG Tab  Take 1,000 mg by mouth every morning.             atenolol (TENORMIN) 50 MG Tab  Take 50 mg by mouth every morning.             budesonide-formoterol (SYMBICORT) 160-4.5 MCG/ACT Aerosol  Inhale 2 Puffs by mouth 2 Times a Day.             Cholecalciferol (VITAMIN D PO)  Take 1 Tab by mouth every morning.             coenzyme Q-10 30 MG capsule  Take 30 mg by mouth every morning.             losartan (COZAAR) 25 MG Tab  Take 25 mg by mouth every morning.             MELATONIN PO  Take 1 Tab by mouth every bedtime.             Omega-3 Fatty Acids (FISH OIL PO)  Take 1 Cap by mouth See Admin Instructions. Pt takes twice per week - Mondays and Thursdays             tiotropium (SPIRIVA HANDIHALER) 18 MCG Cap  Use 1 capsule inhaled from handihaler every day.                 DIET  No orders of the defined types were placed in this encounter.      ACTIVITY  As tolerated.  Weight bearing as tolerated      CONSULTATIONS  Gastroenterology.    PROCEDURES  None.    LABORATORY  Lab Results   Component Value Date/Time    SODIUM 129 (L) 11/18/2017 08:32 AM     POTASSIUM 3.3 (L) 11/18/2017 08:32 AM    CHLORIDE 95 (L) 11/18/2017 08:32 AM    CO2 26 11/18/2017 08:32 AM    GLUCOSE 93 11/18/2017 08:32 AM    BUN 9 11/18/2017 08:32 AM    CREATININE 0.69 11/18/2017 08:32 AM        Lab Results   Component Value Date/Time    WBC 3.8 (L) 11/18/2017 08:32 AM    HEMOGLOBIN 9.0 (L) 11/18/2017 08:32 AM    HEMATOCRIT 27.9 (L) 11/18/2017 08:32 AM    PLATELETCT 149 (L) 11/18/2017 08:32 AM        Total time of the discharge process exceeds 45 minutes

## 2017-11-20 ENCOUNTER — PATIENT OUTREACH (OUTPATIENT)
Dept: HEALTH INFORMATION MANAGEMENT | Facility: OTHER | Age: 77
End: 2017-11-20

## 2017-11-20 ENCOUNTER — TELEPHONE (OUTPATIENT)
Dept: CARDIOLOGY | Facility: MEDICAL CENTER | Age: 77
End: 2017-11-20

## 2017-11-20 NOTE — TELEPHONE ENCOUNTER
patient was in hospital   Received: Today   Message Contents   JOHN Fortune/Lin       Patient said she was just discharged from the hospital, she had to have 2 transfusions and she was taken off of her Eliquis and put on baby aspirin. She can be reached at [phone].      MD advised.  Will need to FU with patient on when to safely resume Eliquis.

## 2017-11-27 ENCOUNTER — TELEPHONE (OUTPATIENT)
Dept: CARDIOLOGY | Facility: MEDICAL CENTER | Age: 77
End: 2017-11-27

## 2017-11-27 ENCOUNTER — OFFICE VISIT (OUTPATIENT)
Dept: CARDIOLOGY | Facility: MEDICAL CENTER | Age: 77
End: 2017-11-27
Payer: MEDICARE

## 2017-11-27 VITALS
WEIGHT: 139 LBS | BODY MASS INDEX: 25.58 KG/M2 | SYSTOLIC BLOOD PRESSURE: 116 MMHG | HEIGHT: 62 IN | OXYGEN SATURATION: 90 % | HEART RATE: 94 BPM | DIASTOLIC BLOOD PRESSURE: 68 MMHG

## 2017-11-27 DIAGNOSIS — I49.3 PVC (PREMATURE VENTRICULAR CONTRACTION): Chronic | ICD-10-CM

## 2017-11-27 DIAGNOSIS — I27.81 COR PULMONALE (HCC): Chronic | ICD-10-CM

## 2017-11-27 DIAGNOSIS — J44.9 CHRONIC OBSTRUCTIVE PULMONARY DISEASE, UNSPECIFIED COPD TYPE (HCC): Chronic | ICD-10-CM

## 2017-11-27 DIAGNOSIS — I48.0 PAROXYSMAL ATRIAL FIBRILLATION (HCC): Chronic | ICD-10-CM

## 2017-11-27 DIAGNOSIS — I10 ESSENTIAL HYPERTENSION: Chronic | ICD-10-CM

## 2017-11-27 PROCEDURE — 99215 OFFICE O/P EST HI 40 MIN: CPT | Performed by: INTERNAL MEDICINE

## 2017-11-27 RX ORDER — SPIRONOLACTONE 25 MG/1
25 TABLET ORAL DAILY
Qty: 30 TAB | Refills: 11 | Status: ON HOLD | OUTPATIENT
Start: 2017-11-27 | End: 2018-04-30

## 2017-11-27 ASSESSMENT — ENCOUNTER SYMPTOMS
STRIDOR: 0
NEUROLOGICAL NEGATIVE: 1
PALPITATIONS: 0
SHORTNESS OF BREATH: 1
GASTROINTESTINAL NEGATIVE: 1
DIZZINESS: 0
CHILLS: 0
ORTHOPNEA: 0
COUGH: 0
EYES NEGATIVE: 1
PND: 0
FEVER: 0
HEMOPTYSIS: 0
CLAUDICATION: 0
MUSCULOSKELETAL NEGATIVE: 1
SPUTUM PRODUCTION: 0
BRUISES/BLEEDS EASILY: 0
LOSS OF CONSCIOUSNESS: 0
WHEEZING: 0
SORE THROAT: 0
WEAKNESS: 0

## 2017-11-27 NOTE — LETTER
[facility]   [address] [place] [address] [address]  [place] [address]  Phone: [phone]  Fax: [phone]              Rox Valdez  1940    Encounter Date: 11/27/2017    Kin Shelley M.D.          PROGRESS NOTE:  Subjective:   Rox Valdez is a 76 y.o. female who presents today is a hospital follow-up for her hemoglobin of 7.7. She has past medicalhistory of atrial fibrillation and was on an oral anticoagulant and was sent to the hospital by her primary care physician for symptomatically with anemia.during the visit she received a 2 unit transfusion along with fluids to help fluid resuscitate her. She presents today with lower extremity edema which is been worsening and not resolving. She was also on low-dose hydrochlorothiazide which was stopped during her hospitalization.  She is complaining of exertional shortness of breath and lower extremity edema.  She is scheduled for an initial consultation with GI in the next couple of weeks.    Past Medical History:   Diagnosis Date   • Atrial fibrillation (CMS-[facility])    • COPD (chronic obstructive pulmonary disease) (CMS-[facility])    • Cor pulmonale (CMS-[facility])    • HTN (hypertension)    • Long term (current) use of anticoagulants    • PVC (premature ventricular contraction)    • Unspecified menopausal and postmenopausal disorder      Past Surgical History:   Procedure Laterality Date   • CATARACT EXTRACTION WITH IOL     • CHOLECYSTECTOMY      LAPAROSCOPIC.   • KNEE ARTHROSCOPY     • RETINAL DETACHMENT REPAIR       Family History   Problem Relation Age of Onset   • Hypertension Mother    • Heart Disease Brother      Atrial Fibrillation.     History   Smoking Status   • Former Smoker   • Packs/day: 1.00   • Years: 28.00   • Types: Cigarettes   • Start date: 1/1/1953   • Quit date: 1/1/1981   Smokeless Tobacco   • Never Used     No Known Allergies  Outpatient Encounter Prescriptions as of 11/27/2017   Medication  "Sig Dispense Refill   • spironolactone (ALDACTONE) 25 MG Tab Take 1 Tab by mouth every day. 30 Tab 11   • acetaminophen (TYLENOL) 500 MG Tab Take 1,000 mg by mouth every morning.     • Cholecalciferol (VITAMIN D PO) Take 1 Tab by mouth every morning.     • MELATONIN PO Take 1 Tab by mouth every bedtime.     • coenzyme Q-10 30 MG capsule Take 30 mg by mouth every morning.     • budesonide-formoterol (SYMBICORT) 160-4.5 MCG/ACT Aerosol Inhale 2 Puffs by mouth 2 Times a Day. 1 Inhaler 11   • tiotropium (SPIRIVA HANDIHALER) 18 MCG Cap Use 1 capsule inhaled from handihaler every day. 90 Cap 3   • losartan (COZAAR) 25 MG Tab Take 25 mg by mouth every morning.     • atenolol (TENORMIN) 50 MG Tab Take 50 mg by mouth every morning.     • Omega-3 Fatty Acids (FISH OIL PO) Take 1 Cap by mouth See Admin Instructions. Pt takes twice per week - Mondays and Thursdays       No facility-administered encounter medications on file as of 11/27/2017.      Review of Systems   Constitutional: Positive for malaise/fatigue. Negative for chills and fever.   HENT: Negative.  Negative for sore throat.    Eyes: Negative.    Respiratory: Positive for shortness of breath. Negative for cough, hemoptysis, sputum production, wheezing and stridor.    Cardiovascular: Positive for leg swelling. Negative for chest pain, palpitations, orthopnea, claudication and PND.   Gastrointestinal: Negative.    Genitourinary: Negative.    Musculoskeletal: Negative.    Skin: Negative.    Neurological: Negative.  Negative for dizziness, loss of consciousness and weakness.   Endo/Heme/Allergies: Negative.  Does not bruise/bleed easily.   All other systems reviewed and are negative.       Objective:   /68   Pulse 94   Ht 1.575 m (5' 2\")   Wt 63 kg (139 lb)   SpO2 90%   BMI 25.42 kg/m²      Physical Exam   Constitutional: She is oriented to person, place, and time. She appears well-developed and well-nourished. No distress.   HENT:   Head: Normocephalic.   "   Mouth/Throat: Oropharynx is clear and moist.   Eyes: EOM are normal. Pupils are equal, round, and reactive to light. Right eye exhibits no discharge. Left eye exhibits no discharge. No scleral icterus.   Neck: Normal range of motion. Neck supple. No JVD present. No tracheal deviation present.   Cardiovascular: Normal rate, regular rhythm, S1 normal, S2 normal, normal heart sounds, intact distal pulses and normal pulses.  Exam reveals no gallop, no S3, no S4 and no friction rub.    No murmur heard.   No systolic murmur is present    No diastolic murmur is present   Pulses:       Carotid pulses are 2+ on the right side, and 2+ on the left side.       Radial pulses are 2+ on the right side, and 2+ on the left side.        Dorsalis pedis pulses are 2+ on the right side, and 2+ on the left side.        Posterior tibial pulses are 2+ on the right side, and 2+ on the left side.   Pulmonary/Chest: Effort normal and breath sounds normal. No respiratory distress. She has no wheezes. She has no rales.   Abdominal: Soft. Bowel sounds are normal. She exhibits no distension and no mass. There is no tenderness. There is no rebound and no guarding.   Musculoskeletal: She exhibits no edema.   Neurological: She is alert and oriented to person, place, and time. No cranial nerve deficit.   Skin: Skin is warm and dry. She is not diaphoretic. No pallor.   Psychiatric: She has a normal mood and affect. Her behavior is normal. Judgment and thought content normal.   Nursing note and vitals reviewed.      Assessment:     1. Paroxysmal atrial fibrillation (CMS-[facility])     2. Chronic obstructive pulmonary disease, unspecified COPD type (CMS-[facility])  BASIC METABOLIC PANEL   3. Cor pulmonale (CMS-HCC)  spironolactone (ALDACTONE) 25 MG Tab    BASIC METABOLIC PANEL   4. Essential hypertension  spironolactone (ALDACTONE) 25 MG Tab    BASIC METABOLIC PANEL   5. PVC (premature ventricular contraction)  spironolactone (ALDACTONE) 25 MG Tab       Medical  Decision Making:  Today's Assessment / Status / Plan:     76-year-old female with some dramatic shortness of breath and lower extremity edema in the setting of cor pulmonale fluid resuscitation and blood transfusions. I will go ahead and start her on a low-dose of spironolactone at 25 mg once per day. We will check labs in one week. I would like to see her back in approximately 4 days to make sure that she is diuresing appropriately. In the meantime we will hold her off of her oral anticoagulant and await a consultation with GI.    Thank for you allowing me to take part in your patient's care, please call should you have any questions or would like to discuss this patient.      Pastor Lopez M.D.  [address] [place] [address] [address]  [address]  [place] [address]  VIA Facsimile: [phone]

## 2017-11-27 NOTE — TELEPHONE ENCOUNTER
swelling & breathing difficulty   Received: Today   Message Contents   Brooklyn Awad R.N.   Phone Number: [phone]             RO/shyla     Pt calling to report swelling in legs & feet for about a week and she is considering going to E/R as she is having breathing trouble, pt has AFIB & COPD. Pt is out of breath just walking from couch to bathroom.     Pt has been taken off Eliquis and hydrochlorothiazide recently.     Please call pt at [phone]      Pt called. RO has opening at 10:45, offered to patient. Pt pleased and would like to be seen. Pt states she can make that.

## 2017-11-27 NOTE — PROGRESS NOTES
Subjective:   Rox Valdez is a 76 y.o. female who presents today is a hospital follow-up for her hemoglobin of 7.7. She has past medicalhistory of atrial fibrillation and was on an oral anticoagulant and was sent to the hospital by her primary care physician for symptomatically with anemia.during the visit she received a 2 unit transfusion along with fluids to help fluid resuscitate her. She presents today with lower extremity edema which is been worsening and not resolving. She was also on low-dose hydrochlorothiazide which was stopped during her hospitalization.  She is complaining of exertional shortness of breath and lower extremity edema.  She is scheduled for an initial consultation with GI in the next couple of weeks.    Past Medical History:   Diagnosis Date   • Atrial fibrillation (CMS-[facility])    • COPD (chronic obstructive pulmonary disease) (CMS-[facility])    • Cor pulmonale (CMS-[facility])    • HTN (hypertension)    • Long term (current) use of anticoagulants    • PVC (premature ventricular contraction)    • Unspecified menopausal and postmenopausal disorder      Past Surgical History:   Procedure Laterality Date   • CATARACT EXTRACTION WITH IOL     • CHOLECYSTECTOMY      LAPAROSCOPIC.   • KNEE ARTHROSCOPY     • RETINAL DETACHMENT REPAIR       Family History   Problem Relation Age of Onset   • Hypertension Mother    • Heart Disease Brother      Atrial Fibrillation.     History   Smoking Status   • Former Smoker   • Packs/day: 1.00   • Years: 28.00   • Types: Cigarettes   • Start date: 1/1/1953   • Quit date: 1/1/1981   Smokeless Tobacco   • Never Used     No Known Allergies  Outpatient Encounter Prescriptions as of 11/27/2017   Medication Sig Dispense Refill   • spironolactone (ALDACTONE) 25 MG Tab Take 1 Tab by mouth every day. 30 Tab 11   • acetaminophen (TYLENOL) 500 MG Tab Take 1,000 mg by mouth every morning.     • Cholecalciferol (VITAMIN D PO) Take 1 Tab by mouth every morning.     • MELATONIN PO Take 1  "Tab by mouth every bedtime.     • coenzyme Q-10 30 MG capsule Take 30 mg by mouth every morning.     • budesonide-formoterol (SYMBICORT) 160-4.5 MCG/ACT Aerosol Inhale 2 Puffs by mouth 2 Times a Day. 1 Inhaler 11   • tiotropium (SPIRIVA HANDIHALER) 18 MCG Cap Use 1 capsule inhaled from handihaler every day. 90 Cap 3   • losartan (COZAAR) 25 MG Tab Take 25 mg by mouth every morning.     • atenolol (TENORMIN) 50 MG Tab Take 50 mg by mouth every morning.     • Omega-3 Fatty Acids (FISH OIL PO) Take 1 Cap by mouth See Admin Instructions. Pt takes twice per week - Mondays and Thursdays       No facility-administered encounter medications on file as of 11/27/2017.      Review of Systems   Constitutional: Positive for malaise/fatigue. Negative for chills and fever.   HENT: Negative.  Negative for sore throat.    Eyes: Negative.    Respiratory: Positive for shortness of breath. Negative for cough, hemoptysis, sputum production, wheezing and stridor.    Cardiovascular: Positive for leg swelling. Negative for chest pain, palpitations, orthopnea, claudication and PND.   Gastrointestinal: Negative.    Genitourinary: Negative.    Musculoskeletal: Negative.    Skin: Negative.    Neurological: Negative.  Negative for dizziness, loss of consciousness and weakness.   Endo/Heme/Allergies: Negative.  Does not bruise/bleed easily.   All other systems reviewed and are negative.       Objective:   /68   Pulse 94   Ht 1.575 m (5' 2\")   Wt 63 kg (139 lb)   SpO2 90%   BMI 25.42 kg/m²     Physical Exam   Constitutional: She is oriented to person, place, and time. She appears well-developed and well-nourished. No distress.   HENT:   Head: Normocephalic.   Mouth/Throat: Oropharynx is clear and moist.   Eyes: EOM are normal. Pupils are equal, round, and reactive to light. Right eye exhibits no discharge. Left eye exhibits no discharge. No scleral icterus.   Neck: Normal range of motion. Neck supple. No JVD present. No tracheal " deviation present.   Cardiovascular: Normal rate, regular rhythm, S1 normal, S2 normal, normal heart sounds, intact distal pulses and normal pulses.  Exam reveals no gallop, no S3, no S4 and no friction rub.    No murmur heard.   No systolic murmur is present    No diastolic murmur is present   Pulses:       Carotid pulses are 2+ on the right side, and 2+ on the left side.       Radial pulses are 2+ on the right side, and 2+ on the left side.        Dorsalis pedis pulses are 2+ on the right side, and 2+ on the left side.        Posterior tibial pulses are 2+ on the right side, and 2+ on the left side.   Pulmonary/Chest: Effort normal and breath sounds normal. No respiratory distress. She has no wheezes. She has no rales.   Abdominal: Soft. Bowel sounds are normal. She exhibits no distension and no mass. There is no tenderness. There is no rebound and no guarding.   Musculoskeletal: She exhibits no edema.   Neurological: She is alert and oriented to person, place, and time. No cranial nerve deficit.   Skin: Skin is warm and dry. She is not diaphoretic. No pallor.   Psychiatric: She has a normal mood and affect. Her behavior is normal. Judgment and thought content normal.   Nursing note and vitals reviewed.      Assessment:     1. Paroxysmal atrial fibrillation (CMS-HCC)     2. Chronic obstructive pulmonary disease, unspecified COPD type (CMS-HCC)  BASIC METABOLIC PANEL   3. Cor pulmonale (CMS-HCC)  spironolactone (ALDACTONE) 25 MG Tab    BASIC METABOLIC PANEL   4. Essential hypertension  spironolactone (ALDACTONE) 25 MG Tab    BASIC METABOLIC PANEL   5. PVC (premature ventricular contraction)  spironolactone (ALDACTONE) 25 MG Tab       Medical Decision Making:  Today's Assessment / Status / Plan:     76-year-old female with some dramatic shortness of breath and lower extremity edema in the setting of cor pulmonale fluid resuscitation and blood transfusions. I will go ahead and start her on a low-dose of  spironolactone at 25 mg once per day. We will check labs in one week. I would like to see her back in approximately 4 days to make sure that she is diuresing appropriately. In the meantime we will hold her off of her oral anticoagulant and await a consultation with GI.    Thank for you allowing me to take part in your patient's care, please call should you have any questions or would like to discuss this patient.

## 2017-12-01 ENCOUNTER — OFFICE VISIT (OUTPATIENT)
Dept: CARDIOLOGY | Facility: MEDICAL CENTER | Age: 77
End: 2017-12-01
Payer: MEDICARE

## 2017-12-01 VITALS
BODY MASS INDEX: 26.02 KG/M2 | HEIGHT: 62 IN | SYSTOLIC BLOOD PRESSURE: 120 MMHG | HEART RATE: 100 BPM | DIASTOLIC BLOOD PRESSURE: 72 MMHG | OXYGEN SATURATION: 92 % | WEIGHT: 141.4 LBS

## 2017-12-01 DIAGNOSIS — I48.0 PAROXYSMAL ATRIAL FIBRILLATION (HCC): ICD-10-CM

## 2017-12-01 DIAGNOSIS — I27.81 COR PULMONALE (HCC): ICD-10-CM

## 2017-12-01 DIAGNOSIS — J44.9 CHRONIC OBSTRUCTIVE PULMONARY DISEASE, UNSPECIFIED COPD TYPE (HCC): ICD-10-CM

## 2017-12-01 DIAGNOSIS — I10 ESSENTIAL HYPERTENSION: ICD-10-CM

## 2017-12-01 PROCEDURE — 99214 OFFICE O/P EST MOD 30 MIN: CPT | Performed by: NURSE PRACTITIONER

## 2017-12-01 ASSESSMENT — ENCOUNTER SYMPTOMS
MYALGIAS: 0
CLAUDICATION: 0
PALPITATIONS: 0
PND: 0
ABDOMINAL PAIN: 0
DIZZINESS: 1
COUGH: 0
SHORTNESS OF BREATH: 1
FEVER: 0
ORTHOPNEA: 0

## 2017-12-01 NOTE — LETTER
[facility]   [address], [address]  [place] [address]  Phone: [phone]  Fax: [phone]              Rox Valdez  1940    Encounter Date: 12/1/2017    PATY Perez          PROGRESS NOTE:  Subjective:   Rox Valdez is a 76 y.o. female who presents today for follow up.    Patient of Dr. Shelley. Pt was last seen 11/27/17. Pt has atrial fibrillation, anemia and cor pulmonale. Pt was started on Spironolactone 25 mg daily to help with her LE edema and SOB. Patient reports that her lower extremity swelling has improved over 4 days, but states it still comes and goes, and is better if she elevates her LE. Patient continues to report shortness breath with any activities of daily living and with exertion. She is Ok at rest.    Pt has appointment with GI next week.      Past Medical History:   Diagnosis Date   • Atrial fibrillation (CMS-[facility])    • COPD (chronic obstructive pulmonary disease) (CMS-[facility])    • Cor pulmonale (CMS-[facility])    • HTN (hypertension)    • Long term (current) use of anticoagulants    • PVC (premature ventricular contraction)    • Unspecified menopausal and postmenopausal disorder      Past Surgical History:   Procedure Laterality Date   • CATARACT EXTRACTION WITH IOL     • CHOLECYSTECTOMY      LAPAROSCOPIC.   • KNEE ARTHROSCOPY     • RETINAL DETACHMENT REPAIR       Family History   Problem Relation Age of Onset   • Hypertension Mother    • Heart Disease Brother      Atrial Fibrillation.     History   Smoking Status   • Former Smoker   • Packs/day: 1.00   • Years: 28.00   • Types: Cigarettes   • Start date: 1/1/1953   • Quit date: 1/1/1981   Smokeless Tobacco   • Never Used     No Known Allergies  Outpatient Encounter Prescriptions as of 12/1/2017   Medication Sig Dispense Refill   • spironolactone (ALDACTONE) 25 MG Tab Take 1 Tab by mouth every day. 30 Tab 11   • acetaminophen (TYLENOL) 500 MG Tab Take 1,000 mg by mouth  "every morning.     • Cholecalciferol (VITAMIN D PO) Take 1 Tab by mouth every morning.     • MELATONIN PO Take 1 Tab by mouth every bedtime.     • coenzyme Q-10 30 MG capsule Take 30 mg by mouth every morning.     • budesonide-formoterol (SYMBICORT) 160-4.5 MCG/ACT Aerosol Inhale 2 Puffs by mouth 2 Times a Day. 1 Inhaler 11   • tiotropium (SPIRIVA HANDIHALER) 18 MCG Cap Use 1 capsule inhaled from handihaler every day. 90 Cap 3   • losartan (COZAAR) 25 MG Tab Take 25 mg by mouth every morning.     • atenolol (TENORMIN) 50 MG Tab Take 50 mg by mouth every morning.     • Omega-3 Fatty Acids (FISH OIL PO) Take 1 Cap by mouth See Admin Instructions. Pt takes twice per week - Mondays and Thursdays       No facility-administered encounter medications on file as of 12/1/2017.      Review of Systems   Constitutional: Positive for malaise/fatigue. Negative for fever.   Respiratory: Positive for shortness of breath. Negative for cough.    Cardiovascular: Positive for leg swelling. Negative for chest pain, palpitations, orthopnea, claudication and PND.   Gastrointestinal: Negative for abdominal pain.   Musculoskeletal: Negative for myalgias.   Neurological: Positive for dizziness (Occ).   All other systems reviewed and are negative.       Objective:   /72   Pulse 100   Ht 1.575 m (5' 2\")   Wt 64.1 kg (141 lb 6.4 oz)   SpO2 92%   BMI 25.86 kg/m²      Physical Exam   Constitutional: She is oriented to person, place, and time. She appears well-developed and well-nourished.   HENT:   Head: Normocephalic and atraumatic.   Eyes: EOM are normal.   Neck: Normal range of motion. Neck supple. No JVD present.   Cardiovascular: Normal rate, regular rhythm, normal heart sounds and intact distal pulses.    No murmur heard.  Pulmonary/Chest: Effort normal and breath sounds normal. No respiratory distress. She has no wheezes. She has no rales.   Diminished breath sounds.    Abdominal: Soft. Bowel sounds are normal.   "   Musculoskeletal: Normal range of motion. She exhibits edema (1-2+ Bilateral LE edema from mid calf to feet. R>L).   Neurological: She is alert and oriented to person, place, and time.   Skin: Skin is warm and dry.   Psychiatric: She has a normal mood and affect.   Nursing note and vitals reviewed.    Lab Results   Component Value Date/Time    CHOLSTRLTOT 189 07/28/2017 09:35 AM     (H) 07/28/2017 09:35 AM    HDL 67 07/28/2017 09:35 AM    TRIGLYCERIDE 75 07/28/2017 09:35 AM       Lab Results   Component Value Date/Time    SODIUM 129 (L) 11/18/2017 08:32 AM    POTASSIUM 3.3 (L) 11/18/2017 08:32 AM    CHLORIDE 95 (L) 11/18/2017 08:32 AM    CO2 26 11/18/2017 08:32 AM    GLUCOSE 93 11/18/2017 08:32 AM    BUN 9 11/18/2017 08:32 AM    CREATININE 0.69 11/18/2017 08:32 AM     Lab Results   Component Value Date/Time    ALKPHOSPHAT 68 11/17/2017 04:24 PM    ASTSGOT 62 (H) 11/17/2017 04:24 PM    ALTSGPT 22 11/17/2017 04:24 PM    TBILIRUBIN 1.2 11/17/2017 04:24 PM        Assessment:     1. Chronic obstructive pulmonary disease, unspecified COPD type (CMS-HCC)     2. Cor pulmonale (CMS-[facility])     3. Paroxysmal atrial fibrillation (CMS-[facility])     4. Essential hypertension       Medical Decision Making:  Today's Assessment / Status / Plan:   1. Cor Pulmonale: Pt continues to have some LE edema  -Continue Spironolactone 25 mg daily   -Encourage continued leg elevation and compression stocking use.  -Get BMP on Monday  -FU with Pulmonology    2. A Fib:  -FU with GI for EGD, Colonoscopy for anemia  -Hold AC  -Continue atenolol 50 mg daily    3. HTN: stable  -Continue atenolol 50 mg daily  -Continue losartan 25 mg daily    FU in clinic in 1 week. Sooner if needed.    Patient verbalizes understanding and agrees with the plan of care.     Collaborating MD: MD Pastor Lindquist M.D.  [address]  [address]  [place] [id number]  VIA Facsimile: [phone]

## 2017-12-01 NOTE — PROGRESS NOTES
Subjective:   Rox Valdez is a 76 y.o. female who presents today for follow up.    Patient of Dr. Shelley. Pt was last seen 11/27/17. Pt has atrial fibrillation, anemia and cor pulmonale. Pt was started on Spironolactone 25 mg daily to help with her LE edema and SOB. Patient reports that her lower extremity swelling has improved over 4 days, but states it still comes and goes, and is better if she elevates her LE. Patient continues to report shortness breath with any activities of daily living and with exertion. She is Ok at rest.    Pt has appointment with GI next week.      Past Medical History:   Diagnosis Date   • Atrial fibrillation (CMS-HCC)    • COPD (chronic obstructive pulmonary disease) (CMS-HCC)    • Cor pulmonale (CMS-HCC)    • HTN (hypertension)    • Long term (current) use of anticoagulants    • PVC (premature ventricular contraction)    • Unspecified menopausal and postmenopausal disorder      Past Surgical History:   Procedure Laterality Date   • CATARACT EXTRACTION WITH IOL     • CHOLECYSTECTOMY      LAPAROSCOPIC.   • KNEE ARTHROSCOPY     • RETINAL DETACHMENT REPAIR       Family History   Problem Relation Age of Onset   • Hypertension Mother    • Heart Disease Brother      Atrial Fibrillation.     History   Smoking Status   • Former Smoker   • Packs/day: 1.00   • Years: 28.00   • Types: Cigarettes   • Start date: 1/1/1953   • Quit date: 1/1/1981   Smokeless Tobacco   • Never Used     No Known Allergies  Outpatient Encounter Prescriptions as of 12/1/2017   Medication Sig Dispense Refill   • spironolactone (ALDACTONE) 25 MG Tab Take 1 Tab by mouth every day. 30 Tab 11   • acetaminophen (TYLENOL) 500 MG Tab Take 1,000 mg by mouth every morning.     • Cholecalciferol (VITAMIN D PO) Take 1 Tab by mouth every morning.     • MELATONIN PO Take 1 Tab by mouth every bedtime.     • coenzyme Q-10 30 MG capsule Take 30 mg by mouth every morning.     • budesonide-formoterol (SYMBICORT) 160-4.5 MCG/ACT  "Aerosol Inhale 2 Puffs by mouth 2 Times a Day. 1 Inhaler 11   • tiotropium (SPIRIVA HANDIHALER) 18 MCG Cap Use 1 capsule inhaled from handihaler every day. 90 Cap 3   • losartan (COZAAR) 25 MG Tab Take 25 mg by mouth every morning.     • atenolol (TENORMIN) 50 MG Tab Take 50 mg by mouth every morning.     • Omega-3 Fatty Acids (FISH OIL PO) Take 1 Cap by mouth See Admin Instructions. Pt takes twice per week - Mondays and Thursdays       No facility-administered encounter medications on file as of 12/1/2017.      Review of Systems   Constitutional: Positive for malaise/fatigue. Negative for fever.   Respiratory: Positive for shortness of breath. Negative for cough.    Cardiovascular: Positive for leg swelling. Negative for chest pain, palpitations, orthopnea, claudication and PND.   Gastrointestinal: Negative for abdominal pain.   Musculoskeletal: Negative for myalgias.   Neurological: Positive for dizziness (Occ).   All other systems reviewed and are negative.       Objective:   /72   Pulse 100   Ht 1.575 m (5' 2\")   Wt 64.1 kg (141 lb 6.4 oz)   SpO2 92%   BMI 25.86 kg/m²     Physical Exam   Constitutional: She is oriented to person, place, and time. She appears well-developed and well-nourished.   HENT:   Head: Normocephalic and atraumatic.   Eyes: EOM are normal.   Neck: Normal range of motion. Neck supple. No JVD present.   Cardiovascular: Normal rate, regular rhythm, normal heart sounds and intact distal pulses.    No murmur heard.  Pulmonary/Chest: Effort normal and breath sounds normal. No respiratory distress. She has no wheezes. She has no rales.   Diminished breath sounds.    Abdominal: Soft. Bowel sounds are normal.   Musculoskeletal: Normal range of motion. She exhibits edema (1-2+ Bilateral LE edema from mid calf to feet. R>L).   Neurological: She is alert and oriented to person, place, and time.   Skin: Skin is warm and dry.   Psychiatric: She has a normal mood and affect.   Nursing note and " vitals reviewed.    Lab Results   Component Value Date/Time    CHOLSTRLTOT 189 07/28/2017 09:35 AM     (H) 07/28/2017 09:35 AM    HDL 67 07/28/2017 09:35 AM    TRIGLYCERIDE 75 07/28/2017 09:35 AM       Lab Results   Component Value Date/Time    SODIUM 129 (L) 11/18/2017 08:32 AM    POTASSIUM 3.3 (L) 11/18/2017 08:32 AM    CHLORIDE 95 (L) 11/18/2017 08:32 AM    CO2 26 11/18/2017 08:32 AM    GLUCOSE 93 11/18/2017 08:32 AM    BUN 9 11/18/2017 08:32 AM    CREATININE 0.69 11/18/2017 08:32 AM     Lab Results   Component Value Date/Time    ALKPHOSPHAT 68 11/17/2017 04:24 PM    ASTSGOT 62 (H) 11/17/2017 04:24 PM    ALTSGPT 22 11/17/2017 04:24 PM    TBILIRUBIN 1.2 11/17/2017 04:24 PM        Assessment:     1. Chronic obstructive pulmonary disease, unspecified COPD type (CMS-[facility])     2. Cor pulmonale (CMS-[facility])     3. Paroxysmal atrial fibrillation (CMS-[facility])     4. Essential hypertension       Medical Decision Making:  Today's Assessment / Status / Plan:   1. Cor Pulmonale: Pt continues to have some LE edema  -Continue Spironolactone 25 mg daily   -Encourage continued leg elevation and compression stocking use.  -Get BMP on Monday  -FU with Pulmonology    2. A Fib:  -FU with GI for EGD, Colonoscopy for anemia  -Hold AC  -Continue atenolol 50 mg daily    3. HTN: stable  -Continue atenolol 50 mg daily  -Continue losartan 25 mg daily    FU in clinic in 1 week. Sooner if needed.    Patient verbalizes understanding and agrees with the plan of care.     Collaborating MD: Kin Shelley MD

## 2017-12-04 ENCOUNTER — HOSPITAL ENCOUNTER (OUTPATIENT)
Dept: LAB | Facility: MEDICAL CENTER | Age: 77
End: 2017-12-04
Attending: INTERNAL MEDICINE
Payer: MEDICARE

## 2017-12-04 DIAGNOSIS — I27.81 COR PULMONALE (HCC): Chronic | ICD-10-CM

## 2017-12-04 DIAGNOSIS — J44.9 CHRONIC OBSTRUCTIVE PULMONARY DISEASE, UNSPECIFIED COPD TYPE (HCC): Chronic | ICD-10-CM

## 2017-12-04 DIAGNOSIS — I10 ESSENTIAL HYPERTENSION: Chronic | ICD-10-CM

## 2017-12-04 LAB
ANION GAP SERPL CALC-SCNC: 6 MMOL/L (ref 0–11.9)
BUN SERPL-MCNC: 12 MG/DL (ref 8–22)
CALCIUM SERPL-MCNC: 9.3 MG/DL (ref 8.5–10.5)
CHLORIDE SERPL-SCNC: 99 MMOL/L (ref 96–112)
CO2 SERPL-SCNC: 28 MMOL/L (ref 20–33)
CREAT SERPL-MCNC: 0.85 MG/DL (ref 0.5–1.4)
GFR SERPL CREATININE-BSD FRML MDRD: >60 ML/MIN/1.73 M 2
GLUCOSE SERPL-MCNC: 142 MG/DL (ref 65–99)
POTASSIUM SERPL-SCNC: 4 MMOL/L (ref 3.6–5.5)
SODIUM SERPL-SCNC: 133 MMOL/L (ref 135–145)

## 2017-12-04 PROCEDURE — 36415 COLL VENOUS BLD VENIPUNCTURE: CPT

## 2017-12-04 PROCEDURE — 80048 BASIC METABOLIC PNL TOTAL CA: CPT

## 2017-12-06 ENCOUNTER — TELEPHONE (OUTPATIENT)
Dept: VASCULAR LAB | Facility: MEDICAL CENTER | Age: 77
End: 2017-12-06

## 2017-12-11 ENCOUNTER — OFFICE VISIT (OUTPATIENT)
Dept: CARDIOLOGY | Facility: MEDICAL CENTER | Age: 77
End: 2017-12-11
Payer: MEDICARE

## 2017-12-11 VITALS
BODY MASS INDEX: 25.62 KG/M2 | DIASTOLIC BLOOD PRESSURE: 72 MMHG | HEIGHT: 62 IN | WEIGHT: 139.2 LBS | SYSTOLIC BLOOD PRESSURE: 120 MMHG | HEART RATE: 96 BPM | OXYGEN SATURATION: 93 %

## 2017-12-11 DIAGNOSIS — I48.0 PAROXYSMAL ATRIAL FIBRILLATION (HCC): ICD-10-CM

## 2017-12-11 DIAGNOSIS — I27.81 COR PULMONALE (HCC): ICD-10-CM

## 2017-12-11 DIAGNOSIS — J44.9 CHRONIC OBSTRUCTIVE PULMONARY DISEASE, UNSPECIFIED COPD TYPE (HCC): ICD-10-CM

## 2017-12-11 DIAGNOSIS — I48.0 PAROXYSMAL ATRIAL FIBRILLATION (HCC): Chronic | ICD-10-CM

## 2017-12-11 DIAGNOSIS — Z79.01 LONG TERM CURRENT USE OF ANTICOAGULANT THERAPY: ICD-10-CM

## 2017-12-11 DIAGNOSIS — I10 ESSENTIAL HYPERTENSION: ICD-10-CM

## 2017-12-11 PROCEDURE — 99214 OFFICE O/P EST MOD 30 MIN: CPT | Performed by: NURSE PRACTITIONER

## 2017-12-11 ASSESSMENT — ENCOUNTER SYMPTOMS
COUGH: 0
ORTHOPNEA: 0
PALPITATIONS: 0
DIZZINESS: 0
FEVER: 0
CLAUDICATION: 0
PND: 0
SHORTNESS OF BREATH: 1
MYALGIAS: 0
ABDOMINAL PAIN: 0

## 2017-12-11 NOTE — PROGRESS NOTES
Subjective:   Rox Valdez is a 76 y.o. female who presents today for follow up.    Patient of Dr. Shelley. Pt was last seen 12/1/17. Pt has atrial fibrillation, anemia and cor pulmonale. Pt continues on Spironolactone 25 mg daily to help with her LE edema and SOB. Patient reports that her lower extremity swelling has improved and Her SOB is better. Patient continues to report shortness breath with exertion which is back to her baseline. Patient denies chest pain, shortness of breath at rest and with ADLs, palpitations, dizziness/lightheadedness, orthopnea, PND. Pt is walking around and does not need to use a wheelchair anymore.    Pt has her EGD and Colonoscopy scheduled on 12/27/17. Pt remains off anticoagulation until after procedure.       Past Medical History:   Diagnosis Date   • Atrial fibrillation (CMS-[facility])    • COPD (chronic obstructive pulmonary disease) (CMS-[facility])    • Cor pulmonale (CMS-HCC)    • HTN (hypertension)    • Long term (current) use of anticoagulants    • PVC (premature ventricular contraction)    • Unspecified menopausal and postmenopausal disorder      Past Surgical History:   Procedure Laterality Date   • CATARACT EXTRACTION WITH IOL     • CHOLECYSTECTOMY      LAPAROSCOPIC.   • KNEE ARTHROSCOPY     • RETINAL DETACHMENT REPAIR       Family History   Problem Relation Age of Onset   • Hypertension Mother    • Heart Disease Brother      Atrial Fibrillation.     History   Smoking Status   • Former Smoker   • Packs/day: 1.00   • Years: 28.00   • Types: Cigarettes   • Start date: 1/1/1953   • Quit date: 1/1/1981   Smokeless Tobacco   • Never Used     No Known Allergies  Outpatient Encounter Prescriptions as of 12/11/2017   Medication Sig Dispense Refill   • aspirin EC (ECOTRIN) 81 MG Tablet Delayed Response Take 81 mg by mouth every day.     • spironolactone (ALDACTONE) 25 MG Tab Take 1 Tab by mouth every day. 30 Tab 11   • acetaminophen (TYLENOL) 500 MG Tab Take 1,000 mg by mouth every  "morning.     • Cholecalciferol (VITAMIN D PO) Take 1 Tab by mouth every morning.     • MELATONIN PO Take 1 Tab by mouth every bedtime.     • coenzyme Q-10 30 MG capsule Take 30 mg by mouth every morning.     • budesonide-formoterol (SYMBICORT) 160-4.5 MCG/ACT Aerosol Inhale 2 Puffs by mouth 2 Times a Day. 1 Inhaler 11   • tiotropium (SPIRIVA HANDIHALER) 18 MCG Cap Use 1 capsule inhaled from handihaler every day. 90 Cap 3   • losartan (COZAAR) 25 MG Tab Take 25 mg by mouth every morning.     • atenolol (TENORMIN) 50 MG Tab Take 50 mg by mouth every morning.     • Omega-3 Fatty Acids (FISH OIL PO) Take 1 Cap by mouth See Admin Instructions. Pt takes twice per week - Mondays and Thursdays       No facility-administered encounter medications on file as of 12/11/2017.      Review of Systems   Constitutional: Positive for malaise/fatigue. Negative for fever.   Respiratory: Positive for shortness of breath. Negative for cough.    Cardiovascular: Positive for leg swelling (improved). Negative for chest pain, palpitations, orthopnea, claudication and PND.   Gastrointestinal: Negative for abdominal pain.   Musculoskeletal: Negative for myalgias.   Neurological: Negative for dizziness.   All other systems reviewed and are negative.       Objective:   /72   Pulse 96   Ht 1.575 m (5' 2\")   Wt 63.1 kg (139 lb 3.2 oz)   SpO2 93%   BMI 25.46 kg/m²     Physical Exam   Constitutional: She is oriented to person, place, and time. She appears well-developed and well-nourished.   HENT:   Head: Normocephalic and atraumatic.   Eyes: EOM are normal.   Neck: Normal range of motion. Neck supple. No JVD present.   Cardiovascular: Normal rate, regular rhythm, normal heart sounds and intact distal pulses.    No murmur heard.  Pulmonary/Chest: Effort normal and breath sounds normal. No respiratory distress. She has no wheezes. She has no rales.   Abdominal: Soft. Bowel sounds are normal.   Musculoskeletal: Normal range of motion. She " exhibits edema (trace-1+ Bilateral LE edema from ankle to feet. R>L).   Neurological: She is alert and oriented to person, place, and time.   Skin: Skin is warm and dry.   Psychiatric: She has a normal mood and affect.   Nursing note and vitals reviewed.    Lab Results   Component Value Date/Time    CHOLSTRLTOT 189 07/28/2017 09:35 AM     (H) 07/28/2017 09:35 AM    HDL 67 07/28/2017 09:35 AM    TRIGLYCERIDE 75 07/28/2017 09:35 AM       Lab Results   Component Value Date/Time    SODIUM 133 (L) 12/04/2017 01:23 PM    POTASSIUM 4.0 12/04/2017 01:23 PM    CHLORIDE 99 12/04/2017 01:23 PM    CO2 28 12/04/2017 01:23 PM    GLUCOSE 142 (H) 12/04/2017 01:23 PM    BUN 12 12/04/2017 01:23 PM    CREATININE 0.85 12/04/2017 01:23 PM     Lab Results   Component Value Date/Time    ALKPHOSPHAT 68 11/17/2017 04:24 PM    ASTSGOT 62 (H) 11/17/2017 04:24 PM    ALTSGPT 22 11/17/2017 04:24 PM    TBILIRUBIN 1.2 11/17/2017 04:24 PM        Assessment:     1. Cor pulmonale (CMS-HCC)     2. Chronic obstructive pulmonary disease, unspecified COPD type (CMS-HCC)     3. Paroxysmal atrial fibrillation (CMS-[facility])     4. Long term current use of anticoagulant therapy     5. Essential hypertension       Medical Decision Making:  Today's Assessment / Status / Plan:   1. Cor Pulmonale: Pt continues to have mild LE edema  -Continue Spironolactone 25 mg daily   -Encourage continued leg elevation and compression stocking use.  -FU with Pulmonology as scheduled    2. A Fib:  -FU with GI, scheduled for EGD, Colonoscopy on 12/27/17  -Hold AC  -Continue ASA 81 mg daily  -Continue atenolol 50 mg daily    3. HTN: stable  -Continue atenolol 50 mg daily  -Continue losartan 25 mg daily    FU in clinic in 4 weeks. Sooner if needed.    Patient verbalizes understanding and agrees with the plan of care.     Collaborating MD: Kin Shelley MD

## 2017-12-11 NOTE — LETTER
[facility]   [address], [facility] [address]  [place] [address]  Phone: [phone]  Fax: [phone]              Rox Valdez  1940    Encounter Date: 12/11/2017    PATY Perez          PROGRESS NOTE:  Subjective:   Rox Valdez is a 76 y.o. female who presents today for follow up.    Patient of Dr. Shelley. Pt was last seen 12/1/17. Pt has atrial fibrillation, anemia and cor pulmonale. Pt continues on Spironolactone 25 mg daily to help with her LE edema and SOB. Patient reports that her lower extremity swelling has improved and Her SOB is better. Patient continues to report shortness breath with exertion which is back to her baseline. Patient denies chest pain, shortness of breath at rest and with ADLs, palpitations, dizziness/lightheadedness, orthopnea, PND. Pt is walking around and does not need to use a wheelchair anymore.    Pt has her EGD and Colonoscopy scheduled on 12/27/17. Pt remains off anticoagulation until after procedure.       Past Medical History:   Diagnosis Date   • Atrial fibrillation (CMS-HCC)    • COPD (chronic obstructive pulmonary disease) (CMS-HCC)    • Cor pulmonale (CMS-HCC)    • HTN (hypertension)    • Long term (current) use of anticoagulants    • PVC (premature ventricular contraction)    • Unspecified menopausal and postmenopausal disorder      Past Surgical History:   Procedure Laterality Date   • CATARACT EXTRACTION WITH IOL     • CHOLECYSTECTOMY      LAPAROSCOPIC.   • KNEE ARTHROSCOPY     • RETINAL DETACHMENT REPAIR       Family History   Problem Relation Age of Onset   • Hypertension Mother    • Heart Disease Brother      Atrial Fibrillation.     History   Smoking Status   • Former Smoker   • Packs/day: 1.00   • Years: 28.00   • Types: Cigarettes   • Start date: 1/1/1953   • Quit date: 1/1/1981   Smokeless Tobacco   • Never Used     No Known Allergies  Outpatient Encounter Prescriptions as of 12/11/2017    "  Medication Sig Dispense Refill   • aspirin EC (ECOTRIN) 81 MG Tablet Delayed Response Take 81 mg by mouth every day.     • spironolactone (ALDACTONE) 25 MG Tab Take 1 Tab by mouth every day. 30 Tab 11   • acetaminophen (TYLENOL) 500 MG Tab Take 1,000 mg by mouth every morning.     • Cholecalciferol (VITAMIN D PO) Take 1 Tab by mouth every morning.     • MELATONIN PO Take 1 Tab by mouth every bedtime.     • coenzyme Q-10 30 MG capsule Take 30 mg by mouth every morning.     • budesonide-formoterol (SYMBICORT) 160-4.5 MCG/ACT Aerosol Inhale 2 Puffs by mouth 2 Times a Day. 1 Inhaler 11   • tiotropium (SPIRIVA HANDIHALER) 18 MCG Cap Use 1 capsule inhaled from handihaler every day. 90 Cap 3   • losartan (COZAAR) 25 MG Tab Take 25 mg by mouth every morning.     • atenolol (TENORMIN) 50 MG Tab Take 50 mg by mouth every morning.     • Omega-3 Fatty Acids (FISH OIL PO) Take 1 Cap by mouth See Admin Instructions. Pt takes twice per week - Mondays and Thursdays       No facility-administered encounter medications on file as of 12/11/2017.      Review of Systems   Constitutional: Positive for malaise/fatigue. Negative for fever.   Respiratory: Positive for shortness of breath. Negative for cough.    Cardiovascular: Positive for leg swelling (improved). Negative for chest pain, palpitations, orthopnea, claudication and PND.   Gastrointestinal: Negative for abdominal pain.   Musculoskeletal: Negative for myalgias.   Neurological: Negative for dizziness.   All other systems reviewed and are negative.       Objective:   /72   Pulse 96   Ht 1.575 m (5' 2\")   Wt 63.1 kg (139 lb 3.2 oz)   SpO2 93%   BMI 25.46 kg/m²      Physical Exam   Constitutional: She is oriented to person, place, and time. She appears well-developed and well-nourished.   HENT:   Head: Normocephalic and atraumatic.   Eyes: EOM are normal.   Neck: Normal range of motion. Neck supple. No JVD present.   Cardiovascular: Normal rate, regular rhythm, normal " heart sounds and intact distal pulses.    No murmur heard.  Pulmonary/Chest: Effort normal and breath sounds normal. No respiratory distress. She has no wheezes. She has no rales.   Abdominal: Soft. Bowel sounds are normal.   Musculoskeletal: Normal range of motion. She exhibits edema (trace-1+ Bilateral LE edema from ankle to feet. R>L).   Neurological: She is alert and oriented to person, place, and time.   Skin: Skin is warm and dry.   Psychiatric: She has a normal mood and affect.   Nursing note and vitals reviewed.    Lab Results   Component Value Date/Time    CHOLSTRLTOT 189 07/28/2017 09:35 AM     (H) 07/28/2017 09:35 AM    HDL 67 07/28/2017 09:35 AM    TRIGLYCERIDE 75 07/28/2017 09:35 AM       Lab Results   Component Value Date/Time    SODIUM 133 (L) 12/04/2017 01:23 PM    POTASSIUM 4.0 12/04/2017 01:23 PM    CHLORIDE 99 12/04/2017 01:23 PM    CO2 28 12/04/2017 01:23 PM    GLUCOSE 142 (H) 12/04/2017 01:23 PM    BUN 12 12/04/2017 01:23 PM    CREATININE 0.85 12/04/2017 01:23 PM     Lab Results   Component Value Date/Time    ALKPHOSPHAT 68 11/17/2017 04:24 PM    ASTSGOT 62 (H) 11/17/2017 04:24 PM    ALTSGPT 22 11/17/2017 04:24 PM    TBILIRUBIN 1.2 11/17/2017 04:24 PM        Assessment:     1. Cor pulmonale (CMS-[facility])     2. Chronic obstructive pulmonary disease, unspecified COPD type (CMS-[facility])     3. Paroxysmal atrial fibrillation (CMS-HCC)     4. Long term current use of anticoagulant therapy     5. Essential hypertension       Medical Decision Making:  Today's Assessment / Status / Plan:   1. Cor Pulmonale: Pt continues to have mild LE edema  -Continue Spironolactone 25 mg daily   -Encourage continued leg elevation and compression stocking use.  -FU with Pulmonology as scheduled    2. A Fib:  -FU with GI, scheduled for EGD, Colonoscopy on 12/27/17  -Hold AC  -Continue ASA 81 mg daily  -Continue atenolol 50 mg daily    3. HTN: stable  -Continue atenolol 50 mg daily  -Continue losartan 25 mg daily    FU  in clinic in 4 weeks. Sooner if needed.    Patient verbalizes understanding and agrees with the plan of care.     Collaborating MD: MD Pastor Lindquist M.D.  [address]  [address]  [place] [address]  VIA Facsimile: [phone]

## 2017-12-12 RX ORDER — APIXABAN 5 MG/1
5 TABLET, FILM COATED ORAL 2 TIMES DAILY
Qty: 180 TAB | Refills: 3 | Status: ON HOLD | OUTPATIENT
Start: 2017-12-12 | End: 2018-04-30

## 2017-12-27 ENCOUNTER — TELEPHONE (OUTPATIENT)
Dept: CARDIOLOGY | Facility: MEDICAL CENTER | Age: 77
End: 2017-12-27

## 2017-12-28 NOTE — TELEPHONE ENCOUNTER
eliquis and spironolactone advice   Received: Today   Message Contents   Brooklyn Awad R.N.   Phone Number: [phone]             MARIE/shyla     Pt calling to tell RO the colonoscopy was done today and needs RO's approval to go back on eliquis.  Pt also wants to know if she should continue to take spironolactone because swelling has gone away and trouble breathing is gone too.  Please call Rox at .      Returned patient call. Pt states colonoscopy was done by [facility]. Dr. Mike Alejandro. Pt told to resume Eliquis in 2 days. Pt told to follow that advice.     Pt states Dr. Vasquez took away HCTZ and potassium and wonders if she should continue spironolactone. Pt walked through timeline of HCTZ stop, swelling and spironolactone start. Pt states understanding and states she will continue then. Pt reminded of APRN visit on 1/9 at 11 am x 2.     No findings today. Pt told everything looks OK. GI 6 week FU with camera scope.

## 2018-01-09 ENCOUNTER — PATIENT OUTREACH (OUTPATIENT)
Dept: HEALTH INFORMATION MANAGEMENT | Facility: OTHER | Age: 78
End: 2018-01-09

## 2018-01-09 NOTE — PROGRESS NOTES
Rox Valdez was discharged on 11/18/2017. [facility] patient advocate assisted with multiple discharge needs including 4 appointments, 3 cardiology appointments, 1 laboratory appointment. Of the 3 appointments the patient kept 3. Patient also has 1 future follow up appointment scheduled with cardiology on 1/9/18. PPS Screening was conducted and was scored at 80%.

## 2018-01-23 ENCOUNTER — HOSPITAL ENCOUNTER (OUTPATIENT)
Dept: LAB | Facility: MEDICAL CENTER | Age: 78
End: 2018-01-23
Attending: FAMILY MEDICINE
Payer: MEDICARE

## 2018-01-23 LAB
ALBUMIN SERPL BCP-MCNC: 3.2 G/DL (ref 3.2–4.9)
ALBUMIN/GLOB SERPL: 0.8 G/DL
ALP SERPL-CCNC: 51 U/L (ref 30–99)
ALT SERPL-CCNC: 18 U/L (ref 2–50)
ANION GAP SERPL CALC-SCNC: 5 MMOL/L (ref 0–11.9)
ANISOCYTOSIS BLD QL SMEAR: ABNORMAL
AST SERPL-CCNC: 40 U/L (ref 12–45)
BASOPHILS # BLD AUTO: 1.2 % (ref 0–1.8)
BASOPHILS # BLD: 0.05 K/UL (ref 0–0.12)
BILIRUB SERPL-MCNC: 1.3 MG/DL (ref 0.1–1.5)
BUN SERPL-MCNC: 17 MG/DL (ref 8–22)
CALCIUM SERPL-MCNC: 10.1 MG/DL (ref 8.5–10.5)
CHLORIDE SERPL-SCNC: 94 MMOL/L (ref 96–112)
CO2 SERPL-SCNC: 27 MMOL/L (ref 20–33)
COMMENT 1642: NORMAL
CREAT SERPL-MCNC: 0.78 MG/DL (ref 0.5–1.4)
EOSINOPHIL # BLD AUTO: 0.2 K/UL (ref 0–0.51)
EOSINOPHIL NFR BLD: 4.8 % (ref 0–6.9)
ERYTHROCYTE [DISTWIDTH] IN BLOOD BY AUTOMATED COUNT: 70.5 FL (ref 35.9–50)
FERRITIN SERPL-MCNC: 26.2 NG/ML (ref 10–291)
GLOBULIN SER CALC-MCNC: 3.8 G/DL (ref 1.9–3.5)
GLUCOSE SERPL-MCNC: 82 MG/DL (ref 65–99)
HCT VFR BLD AUTO: 33.7 % (ref 37–47)
HGB BLD-MCNC: 10.9 G/DL (ref 12–16)
IMM GRANULOCYTES # BLD AUTO: 0.02 K/UL (ref 0–0.11)
IMM GRANULOCYTES NFR BLD AUTO: 0.5 % (ref 0–0.9)
IRON SATN MFR SERPL: 10 % (ref 15–55)
IRON SERPL-MCNC: 44 UG/DL (ref 40–170)
LYMPHOCYTES # BLD AUTO: 0.78 K/UL (ref 1–4.8)
LYMPHOCYTES NFR BLD: 18.9 % (ref 22–41)
MACROCYTES BLD QL SMEAR: ABNORMAL
MCH RBC QN AUTO: 29 PG (ref 27–33)
MCHC RBC AUTO-ENTMCNC: 32.3 G/DL (ref 33.6–35)
MCV RBC AUTO: 89.6 FL (ref 81.4–97.8)
MICROCYTES BLD QL SMEAR: ABNORMAL
MONOCYTES # BLD AUTO: 0.84 K/UL (ref 0–0.85)
MONOCYTES NFR BLD AUTO: 20.3 % (ref 0–13.4)
MORPHOLOGY BLD-IMP: NORMAL
NEUTROPHILS # BLD AUTO: 2.24 K/UL (ref 2–7.15)
NEUTROPHILS NFR BLD: 54.3 % (ref 44–72)
NRBC # BLD AUTO: 0 K/UL
NRBC BLD-RTO: 0 /100 WBC
PLATELET # BLD AUTO: 170 K/UL (ref 164–446)
PLATELET BLD QL SMEAR: NORMAL
PMV BLD AUTO: 10.7 FL (ref 9–12.9)
POTASSIUM SERPL-SCNC: 4.8 MMOL/L (ref 3.6–5.5)
PROT SERPL-MCNC: 7 G/DL (ref 6–8.2)
RBC # BLD AUTO: 3.76 M/UL (ref 4.2–5.4)
RBC BLD AUTO: PRESENT
SODIUM SERPL-SCNC: 126 MMOL/L (ref 135–145)
TIBC SERPL-MCNC: 461 UG/DL (ref 250–450)
VIT B12 SERPL-MCNC: 562 PG/ML (ref 211–911)
WBC # BLD AUTO: 4.1 K/UL (ref 4.8–10.8)

## 2018-01-23 PROCEDURE — 83540 ASSAY OF IRON: CPT

## 2018-01-23 PROCEDURE — 82728 ASSAY OF FERRITIN: CPT

## 2018-01-23 PROCEDURE — 80053 COMPREHEN METABOLIC PANEL: CPT

## 2018-01-23 PROCEDURE — 82607 VITAMIN B-12: CPT

## 2018-01-23 PROCEDURE — 85025 COMPLETE CBC W/AUTO DIFF WBC: CPT

## 2018-01-23 PROCEDURE — 36415 COLL VENOUS BLD VENIPUNCTURE: CPT

## 2018-01-23 PROCEDURE — 83550 IRON BINDING TEST: CPT

## 2018-01-26 ENCOUNTER — TELEPHONE (OUTPATIENT)
Dept: PULMONOLOGY | Facility: HOSPICE | Age: 78
End: 2018-01-26

## 2018-01-26 DIAGNOSIS — J44.9 CHRONIC OBSTRUCTIVE PULMONARY DISEASE, UNSPECIFIED COPD TYPE (HCC): ICD-10-CM

## 2018-01-26 NOTE — TELEPHONE ENCOUNTER
Patient dropped off paperwork for Patient assistance for Spiriva. She see's you nect 3/7/2018. Would you be willing to print out paper prescription and sign form.    Please Advise

## 2018-01-29 RX ORDER — TIOTROPIUM BROMIDE 18 UG/1
CAPSULE ORAL; RESPIRATORY (INHALATION)
Qty: 90 CAP | Refills: 3 | Status: SHIPPED
Start: 2018-01-29

## 2018-02-15 ENCOUNTER — ANTICOAGULATION MONITORING (OUTPATIENT)
Dept: VASCULAR LAB | Facility: MEDICAL CENTER | Age: 78
End: 2018-02-15

## 2018-02-15 DIAGNOSIS — I48.0 PAROXYSMAL ATRIAL FIBRILLATION (HCC): ICD-10-CM

## 2018-02-16 NOTE — PROGRESS NOTES
Left voicemail message for DOAC f/u  Drug: Eliquis 5mg po bid  Indication: Atrial Fibrillation    CHADsVASC: 3  ClCr: >60ml/min  Hgb: 10.6    Continue same dose and recheck labs in 6 months.      Tatum Avalos, PharmD

## 2018-02-28 ENCOUNTER — APPOINTMENT (RX ONLY)
Dept: URBAN - METROPOLITAN AREA CLINIC 4 | Facility: CLINIC | Age: 78
Setting detail: DERMATOLOGY
End: 2018-02-28

## 2018-02-28 DIAGNOSIS — D22 MELANOCYTIC NEVI: ICD-10-CM

## 2018-02-28 DIAGNOSIS — L81.4 OTHER MELANIN HYPERPIGMENTATION: ICD-10-CM

## 2018-02-28 DIAGNOSIS — Z71.89 OTHER SPECIFIED COUNSELING: ICD-10-CM

## 2018-02-28 DIAGNOSIS — L82.1 OTHER SEBORRHEIC KERATOSIS: ICD-10-CM

## 2018-02-28 DIAGNOSIS — L30.9 DERMATITIS, UNSPECIFIED: ICD-10-CM

## 2018-02-28 PROBLEM — D22.5 MELANOCYTIC NEVI OF TRUNK: Status: ACTIVE | Noted: 2018-02-28

## 2018-02-28 PROBLEM — L85.3 XEROSIS CUTIS: Status: ACTIVE | Noted: 2018-02-28

## 2018-02-28 PROBLEM — I48.91 UNSPECIFIED ATRIAL FIBRILLATION: Status: ACTIVE | Noted: 2018-02-28

## 2018-02-28 PROBLEM — D22.61 MELANOCYTIC NEVI OF RIGHT UPPER LIMB, INCLUDING SHOULDER: Status: ACTIVE | Noted: 2018-02-28

## 2018-02-28 PROBLEM — I10 ESSENTIAL (PRIMARY) HYPERTENSION: Status: ACTIVE | Noted: 2018-02-28

## 2018-02-28 PROCEDURE — ? PRESCRIPTION

## 2018-02-28 PROCEDURE — ? COUNSELING

## 2018-02-28 PROCEDURE — 99203 OFFICE O/P NEW LOW 30 MIN: CPT

## 2018-02-28 PROCEDURE — ? PHOTO-DOCUMENTATION

## 2018-02-28 RX ORDER — TRIAMCINOLONE ACETONIDE 1 MG/G
CREAM TOPICAL
Qty: 1 | Refills: 1 | Status: ERX | COMMUNITY
Start: 2018-02-28

## 2018-02-28 RX ADMIN — TRIAMCINOLONE ACETONIDE: 1 CREAM TOPICAL at 17:29

## 2018-02-28 ASSESSMENT — LOCATION DETAILED DESCRIPTION DERM
LOCATION DETAILED: SUPERIOR THORACIC SPINE
LOCATION DETAILED: LEFT INFERIOR MEDIAL UPPER BACK
LOCATION DETAILED: RIGHT VENTRAL PROXIMAL FOREARM
LOCATION DETAILED: RIGHT MID-UPPER BACK
LOCATION DETAILED: RIGHT PROXIMAL DORSAL FOREARM
LOCATION DETAILED: LEFT SUPERIOR MEDIAL UPPER BACK
LOCATION DETAILED: UPPER STERNUM
LOCATION DETAILED: LEFT MEDIAL TRAPEZIAL NECK
LOCATION DETAILED: LEFT VENTRAL PROXIMAL FOREARM
LOCATION DETAILED: LEFT INFERIOR ANTERIOR NECK
LOCATION DETAILED: LEFT PROXIMAL DORSAL FOREARM
LOCATION DETAILED: LEFT SUPERIOR UPPER BACK
LOCATION DETAILED: LEFT LATERAL SUPERIOR CHEST

## 2018-02-28 ASSESSMENT — LOCATION SIMPLE DESCRIPTION DERM
LOCATION SIMPLE: RIGHT FOREARM
LOCATION SIMPLE: POSTERIOR NECK
LOCATION SIMPLE: LEFT FOREARM
LOCATION SIMPLE: LEFT ANTERIOR NECK
LOCATION SIMPLE: RIGHT UPPER BACK
LOCATION SIMPLE: UPPER BACK
LOCATION SIMPLE: CHEST
LOCATION SIMPLE: LEFT UPPER BACK
LOCATION SIMPLE: LEFT UPPER BACK

## 2018-02-28 ASSESSMENT — LOCATION ZONE DERM
LOCATION ZONE: NECK
LOCATION ZONE: ARM
LOCATION ZONE: NECK
LOCATION ZONE: TRUNK
LOCATION ZONE: TRUNK

## 2018-03-07 ENCOUNTER — APPOINTMENT (OUTPATIENT)
Dept: PULMONOLOGY | Facility: HOSPICE | Age: 78
End: 2018-03-07
Payer: MEDICARE

## 2018-03-09 ENCOUNTER — HOSPITAL ENCOUNTER (OUTPATIENT)
Dept: LAB | Facility: MEDICAL CENTER | Age: 78
End: 2018-03-09
Attending: FAMILY MEDICINE
Payer: MEDICARE

## 2018-03-09 LAB
BASOPHILS # BLD AUTO: 1.1 % (ref 0–1.8)
BASOPHILS # BLD: 0.04 K/UL (ref 0–0.12)
EOSINOPHIL # BLD AUTO: 0.25 K/UL (ref 0–0.51)
EOSINOPHIL NFR BLD: 7 % (ref 0–6.9)
ERYTHROCYTE [DISTWIDTH] IN BLOOD BY AUTOMATED COUNT: 59.4 FL (ref 35.9–50)
FERRITIN SERPL-MCNC: 96.4 NG/ML (ref 10–291)
HCT VFR BLD AUTO: 39.3 % (ref 37–47)
HGB BLD-MCNC: 12.9 G/DL (ref 12–16)
IMM GRANULOCYTES # BLD AUTO: 0.02 K/UL (ref 0–0.11)
IMM GRANULOCYTES NFR BLD AUTO: 0.6 % (ref 0–0.9)
IRON SATN MFR SERPL: 88 % (ref 15–55)
IRON SERPL-MCNC: 349 UG/DL (ref 40–170)
LYMPHOCYTES # BLD AUTO: 0.63 K/UL (ref 1–4.8)
LYMPHOCYTES NFR BLD: 17.7 % (ref 22–41)
MCH RBC QN AUTO: 31.6 PG (ref 27–33)
MCHC RBC AUTO-ENTMCNC: 32.8 G/DL (ref 33.6–35)
MCV RBC AUTO: 96.3 FL (ref 81.4–97.8)
MONOCYTES # BLD AUTO: 0.63 K/UL (ref 0–0.85)
MONOCYTES NFR BLD AUTO: 17.7 % (ref 0–13.4)
NEUTROPHILS # BLD AUTO: 1.99 K/UL (ref 2–7.15)
NEUTROPHILS NFR BLD: 55.9 % (ref 44–72)
NRBC # BLD AUTO: 0 K/UL
NRBC BLD-RTO: 0 /100 WBC
PLATELET # BLD AUTO: 149 K/UL (ref 164–446)
PMV BLD AUTO: 10.8 FL (ref 9–12.9)
RBC # BLD AUTO: 4.08 M/UL (ref 4.2–5.4)
TIBC SERPL-MCNC: 398 UG/DL (ref 250–450)
WBC # BLD AUTO: 3.6 K/UL (ref 4.8–10.8)

## 2018-03-09 PROCEDURE — 83550 IRON BINDING TEST: CPT

## 2018-03-09 PROCEDURE — 36415 COLL VENOUS BLD VENIPUNCTURE: CPT

## 2018-03-09 PROCEDURE — 83540 ASSAY OF IRON: CPT

## 2018-03-09 PROCEDURE — 85025 COMPLETE CBC W/AUTO DIFF WBC: CPT

## 2018-03-09 PROCEDURE — 82728 ASSAY OF FERRITIN: CPT

## 2018-03-26 ENCOUNTER — HOSPITAL ENCOUNTER (OUTPATIENT)
Dept: LAB | Facility: MEDICAL CENTER | Age: 78
End: 2018-03-26
Attending: INTERNAL MEDICINE
Payer: MEDICARE

## 2018-03-26 LAB
CRP SERPL HS-MCNC: 2.21 MG/DL (ref 0–0.75)
ERYTHROCYTE [SEDIMENTATION RATE] IN BLOOD BY WESTERGREN METHOD: 30 MM/HOUR (ref 0–30)
HCV AB SER QL: NEGATIVE
T4 FREE SERPL-MCNC: 1.05 NG/DL (ref 0.53–1.43)
THYROPEROXIDASE AB SERPL-ACNC: 1.2 IU/ML (ref 0–9)
TSH SERPL DL<=0.005 MIU/L-ACNC: 2.62 UIU/ML (ref 0.38–5.33)

## 2018-03-26 PROCEDURE — 86038 ANTINUCLEAR ANTIBODIES: CPT

## 2018-03-26 PROCEDURE — 36415 COLL VENOUS BLD VENIPUNCTURE: CPT

## 2018-03-26 PROCEDURE — 83520 IMMUNOASSAY QUANT NOS NONAB: CPT

## 2018-03-26 PROCEDURE — 86800 THYROGLOBULIN ANTIBODY: CPT

## 2018-03-26 PROCEDURE — 86225 DNA ANTIBODY NATIVE: CPT

## 2018-03-26 PROCEDURE — 84439 ASSAY OF FREE THYROXINE: CPT

## 2018-03-26 PROCEDURE — 82784 ASSAY IGA/IGD/IGG/IGM EACH: CPT

## 2018-03-26 PROCEDURE — 85652 RBC SED RATE AUTOMATED: CPT

## 2018-03-26 PROCEDURE — 84443 ASSAY THYROID STIM HORMONE: CPT

## 2018-03-26 PROCEDURE — 86334 IMMUNOFIX E-PHORESIS SERUM: CPT

## 2018-03-26 PROCEDURE — 86140 C-REACTIVE PROTEIN: CPT

## 2018-03-26 PROCEDURE — 84165 PROTEIN E-PHORESIS SERUM: CPT

## 2018-03-26 PROCEDURE — 82785 ASSAY OF IGE: CPT

## 2018-03-26 PROCEDURE — 84160 ASSAY OF PROTEIN ANY SOURCE: CPT

## 2018-03-26 PROCEDURE — 88184 FLOWCYTOMETRY/ TC 1 MARKER: CPT

## 2018-03-26 PROCEDURE — 88185 FLOWCYTOMETRY/TC ADD-ON: CPT

## 2018-03-26 PROCEDURE — 86376 MICROSOMAL ANTIBODY EACH: CPT

## 2018-03-26 PROCEDURE — 86803 HEPATITIS C AB TEST: CPT

## 2018-03-26 PROCEDURE — 86235 NUCLEAR ANTIGEN ANTIBODY: CPT | Mod: 91

## 2018-03-28 LAB
ANNOTATION COMMENT IMP: ABNORMAL
CD19 CELLS NFR SPEC: 6 % (ref 5–21)
CD3 CELLS # BLD: 316 CELLS/UL (ref 660–2200)
CD3 CELLS NFR SPEC: 69 % (ref 62–89)
CD3+CD4+ CELLS # BLD: 219 CELLS/UL (ref 490–1600)
CD3+CD4+ CELLS NFR BLD: 48 % (ref 35–68)
CD3+CD4+ CELLS/CD3+CD8+ CLL BLD: 2.18 RATIO (ref 0.8–6.17)
CD3+CD8+ CELLS # BLD: 100 CELLS/UL (ref 150–1050)
CD3+CD8+ CELLS NFR SPEC: 22 % (ref 10–46)
CD3-CD16+CD56+ CELLS # SPEC: 107 CELLS/UL (ref 74–620)
CD3-CD16+CD56+ CELLS NFR SPEC: 24 % (ref 5–28)
CELLS.CD3-CD19+ [#/VOLUME] IN BLOOD: 26 CELLS/UL (ref 74–510)
IGE SERPL-ACNC: 2858 KU/L
NUCLEAR IGG SER QL IA: NORMAL
THYROGLOB AB SERPL-ACNC: <0.9 IU/ML (ref 0–4)
TRYPTASE SERPL-MCNC: 8.3 UG/L

## 2018-03-29 LAB
ALBUMIN SERPL-MCNC: 3.26 G/DL (ref 3.75–5.01)
ALPHA1 GLOB SERPL ELPH-MCNC: 0.31 G/DL (ref 0.19–0.46)
ALPHA2 GLOB SERPL ELPH-MCNC: 0.67 G/DL (ref 0.48–1.05)
B-GLOBULIN SERPL ELPH-MCNC: 1.08 G/DL (ref 0.48–1.1)
GAMMA GLOB SERPL ELPH-MCNC: 1.18 G/DL (ref 0.62–1.51)
IGA SERPL-MCNC: 693 MG/DL (ref 68–408)
IGG SERPL-MCNC: 1140 MG/DL (ref 768–1632)
IGM SERPL-MCNC: 68 MG/DL (ref 35–263)
INTERPRETATION SERPL IFE-IMP: ABNORMAL
INTERPRETATION SERPL IFE-IMP: ABNORMAL
PATHOLOGY STUDY: ABNORMAL
PROT SERPL-MCNC: 6.5 G/DL (ref 6–8.3)

## 2018-04-22 ENCOUNTER — APPOINTMENT (OUTPATIENT)
Dept: RADIOLOGY | Facility: MEDICAL CENTER | Age: 78
DRG: 441 | End: 2018-04-22
Attending: EMERGENCY MEDICINE
Payer: MEDICARE

## 2018-04-22 ENCOUNTER — HOSPITAL ENCOUNTER (INPATIENT)
Facility: MEDICAL CENTER | Age: 78
LOS: 9 days | DRG: 441 | End: 2018-05-01
Attending: EMERGENCY MEDICINE | Admitting: HOSPITALIST
Payer: MEDICARE

## 2018-04-22 DIAGNOSIS — I48.0 PAROXYSMAL ATRIAL FIBRILLATION (HCC): Chronic | ICD-10-CM

## 2018-04-22 DIAGNOSIS — I48.91 ATRIAL FIBRILLATION, UNSPECIFIED TYPE (HCC): ICD-10-CM

## 2018-04-22 DIAGNOSIS — E80.6 HYPERBILIRUBINEMIA: ICD-10-CM

## 2018-04-22 DIAGNOSIS — N17.9 ACUTE KIDNEY INJURY (HCC): ICD-10-CM

## 2018-04-22 DIAGNOSIS — N17.9 AKI (ACUTE KIDNEY INJURY) (HCC): ICD-10-CM

## 2018-04-22 DIAGNOSIS — S32.010A CLOSED COMPRESSION FRACTURE OF FIRST LUMBAR VERTEBRA, INITIAL ENCOUNTER: ICD-10-CM

## 2018-04-22 DIAGNOSIS — K72.10 CHRONIC LIVER FAILURE WITHOUT HEPATIC COMA (HCC): ICD-10-CM

## 2018-04-22 DIAGNOSIS — K72.00 ACUTE LIVER FAILURE WITHOUT HEPATIC COMA: ICD-10-CM

## 2018-04-22 DIAGNOSIS — I10 ESSENTIAL HYPERTENSION: Chronic | ICD-10-CM

## 2018-04-22 DIAGNOSIS — D68.9 COAGULOPATHY (HCC): ICD-10-CM

## 2018-04-22 DIAGNOSIS — J43.9 PULMONARY EMPHYSEMA, UNSPECIFIED EMPHYSEMA TYPE (HCC): Chronic | ICD-10-CM

## 2018-04-22 DIAGNOSIS — R74.01 TRANSAMINITIS: ICD-10-CM

## 2018-04-22 PROBLEM — I95.9 HYPOTENSION: Status: ACTIVE | Noted: 2018-04-22

## 2018-04-22 PROBLEM — E87.20 LACTIC ACIDOSIS: Status: ACTIVE | Noted: 2018-04-22

## 2018-04-22 PROBLEM — R93.89 ABNORMAL CXR: Status: ACTIVE | Noted: 2018-04-22

## 2018-04-22 LAB
ALBUMIN SERPL BCP-MCNC: 2.1 G/DL (ref 3.2–4.9)
ALBUMIN/GLOB SERPL: 0.7 G/DL
ALP SERPL-CCNC: 73 U/L (ref 30–99)
ALT SERPL-CCNC: 365 U/L (ref 2–50)
AMMONIA PLAS-SCNC: 51 UMOL/L (ref 11–45)
ANION GAP SERPL CALC-SCNC: 12 MMOL/L (ref 0–11.9)
APAP SERPL-MCNC: <10 UG/ML (ref 10–30)
APTT PPP: 53 SEC (ref 24.7–36)
AST SERPL-CCNC: 831 U/L (ref 12–45)
BASOPHILS # BLD AUTO: 0.7 % (ref 0–1.8)
BASOPHILS # BLD: 0.06 K/UL (ref 0–0.12)
BILIRUB SERPL-MCNC: 19.2 MG/DL (ref 0.1–1.5)
BUN SERPL-MCNC: 48 MG/DL (ref 8–22)
CALCIUM SERPL-MCNC: 9.3 MG/DL (ref 8.5–10.5)
CHLORIDE SERPL-SCNC: 99 MMOL/L (ref 96–112)
CO2 SERPL-SCNC: 16 MMOL/L (ref 20–33)
CREAT SERPL-MCNC: 2.08 MG/DL (ref 0.5–1.4)
EOSINOPHIL # BLD AUTO: 0.16 K/UL (ref 0–0.51)
EOSINOPHIL NFR BLD: 1.8 % (ref 0–6.9)
ERYTHROCYTE [DISTWIDTH] IN BLOOD BY AUTOMATED COUNT: 61.5 FL (ref 35.9–50)
GLOBULIN SER CALC-MCNC: 3.2 G/DL (ref 1.9–3.5)
GLUCOSE SERPL-MCNC: 83 MG/DL (ref 65–99)
HAV IGM SERPL QL IA: NEGATIVE
HBV CORE IGM SER QL: NEGATIVE
HBV SURFACE AG SER QL: NEGATIVE
HCT VFR BLD AUTO: 36.6 % (ref 37–47)
HCV AB SER QL: NEGATIVE
HGB BLD-MCNC: 12.7 G/DL (ref 12–16)
IMM GRANULOCYTES # BLD AUTO: 0.14 K/UL (ref 0–0.11)
IMM GRANULOCYTES NFR BLD AUTO: 1.5 % (ref 0–0.9)
INR PPP: 6.03 (ref 0.87–1.13)
LACTATE BLD-SCNC: 2.3 MMOL/L (ref 0.5–2)
LACTATE BLD-SCNC: 4.4 MMOL/L (ref 0.5–2)
LYMPHOCYTES # BLD AUTO: 0.67 K/UL (ref 1–4.8)
LYMPHOCYTES NFR BLD: 7.4 % (ref 22–41)
MAGNESIUM SERPL-MCNC: 1.5 MG/DL (ref 1.5–2.5)
MCH RBC QN AUTO: 34.1 PG (ref 27–33)
MCHC RBC AUTO-ENTMCNC: 34.7 G/DL (ref 33.6–35)
MCV RBC AUTO: 98.4 FL (ref 81.4–97.8)
MONOCYTES # BLD AUTO: 1.48 K/UL (ref 0–0.85)
MONOCYTES NFR BLD AUTO: 16.3 % (ref 0–13.4)
NEUTROPHILS # BLD AUTO: 6.59 K/UL (ref 2–7.15)
NEUTROPHILS NFR BLD: 72.3 % (ref 44–72)
NRBC # BLD AUTO: 0 K/UL
NRBC BLD-RTO: 0 /100 WBC
PLATELET # BLD AUTO: 134 K/UL (ref 164–446)
PMV BLD AUTO: 11 FL (ref 9–12.9)
POTASSIUM SERPL-SCNC: 5.1 MMOL/L (ref 3.6–5.5)
PROT SERPL-MCNC: 5.3 G/DL (ref 6–8.2)
PROTHROMBIN TIME: 53.7 SEC (ref 12–14.6)
RBC # BLD AUTO: 3.72 M/UL (ref 4.2–5.4)
SODIUM SERPL-SCNC: 127 MMOL/L (ref 135–145)
TROPONIN I SERPL-MCNC: 0.02 NG/ML (ref 0–0.04)
WBC # BLD AUTO: 9.1 K/UL (ref 4.8–10.8)

## 2018-04-22 PROCEDURE — 84520 ASSAY OF UREA NITROGEN: CPT

## 2018-04-22 PROCEDURE — 36415 COLL VENOUS BLD VENIPUNCTURE: CPT

## 2018-04-22 PROCEDURE — 82525 ASSAY OF COPPER: CPT

## 2018-04-22 PROCEDURE — 86255 FLUORESCENT ANTIBODY SCREEN: CPT

## 2018-04-22 PROCEDURE — 82728 ASSAY OF FERRITIN: CPT

## 2018-04-22 PROCEDURE — 71045 X-RAY EXAM CHEST 1 VIEW: CPT

## 2018-04-22 PROCEDURE — 70450 CT HEAD/BRAIN W/O DYE: CPT

## 2018-04-22 PROCEDURE — 85610 PROTHROMBIN TIME: CPT

## 2018-04-22 PROCEDURE — 700111 HCHG RX REV CODE 636 W/ 250 OVERRIDE (IP): Performed by: INTERNAL MEDICINE

## 2018-04-22 PROCEDURE — 80053 COMPREHEN METABOLIC PANEL: CPT

## 2018-04-22 PROCEDURE — 96366 THER/PROPH/DIAG IV INF ADDON: CPT

## 2018-04-22 PROCEDURE — 82570 ASSAY OF URINE CREATININE: CPT

## 2018-04-22 PROCEDURE — 84300 ASSAY OF URINE SODIUM: CPT

## 2018-04-22 PROCEDURE — 83735 ASSAY OF MAGNESIUM: CPT

## 2018-04-22 PROCEDURE — 700111 HCHG RX REV CODE 636 W/ 250 OVERRIDE (IP): Performed by: EMERGENCY MEDICINE

## 2018-04-22 PROCEDURE — 85025 COMPLETE CBC W/AUTO DIFF WBC: CPT

## 2018-04-22 PROCEDURE — 84156 ASSAY OF PROTEIN URINE: CPT

## 2018-04-22 PROCEDURE — 99291 CRITICAL CARE FIRST HOUR: CPT

## 2018-04-22 PROCEDURE — A9270 NON-COVERED ITEM OR SERVICE: HCPCS | Performed by: INTERNAL MEDICINE

## 2018-04-22 PROCEDURE — 72100 X-RAY EXAM L-S SPINE 2/3 VWS: CPT

## 2018-04-22 PROCEDURE — 76705 ECHO EXAM OF ABDOMEN: CPT

## 2018-04-22 PROCEDURE — 82436 ASSAY OF URINE CHLORIDE: CPT

## 2018-04-22 PROCEDURE — 96367 TX/PROPH/DG ADDL SEQ IV INF: CPT

## 2018-04-22 PROCEDURE — 700105 HCHG RX REV CODE 258: Performed by: INTERNAL MEDICINE

## 2018-04-22 PROCEDURE — 87040 BLOOD CULTURE FOR BACTERIA: CPT | Mod: 91

## 2018-04-22 PROCEDURE — 96365 THER/PROPH/DIAG IV INF INIT: CPT

## 2018-04-22 PROCEDURE — 87389 HIV-1 AG W/HIV-1&-2 AB AG IA: CPT

## 2018-04-22 PROCEDURE — 80307 DRUG TEST PRSMV CHEM ANLYZR: CPT

## 2018-04-22 PROCEDURE — 700101 HCHG RX REV CODE 250: Performed by: EMERGENCY MEDICINE

## 2018-04-22 PROCEDURE — 83605 ASSAY OF LACTIC ACID: CPT | Mod: 91

## 2018-04-22 PROCEDURE — 96375 TX/PRO/DX INJ NEW DRUG ADDON: CPT

## 2018-04-22 PROCEDURE — 85730 THROMBOPLASTIN TIME PARTIAL: CPT

## 2018-04-22 PROCEDURE — 82390 ASSAY OF CERULOPLASMIN: CPT

## 2018-04-22 PROCEDURE — 770022 HCHG ROOM/CARE - ICU (200)

## 2018-04-22 PROCEDURE — 82140 ASSAY OF AMMONIA: CPT

## 2018-04-22 PROCEDURE — 93005 ELECTROCARDIOGRAM TRACING: CPT | Performed by: EMERGENCY MEDICINE

## 2018-04-22 PROCEDURE — 80074 ACUTE HEPATITIS PANEL: CPT

## 2018-04-22 PROCEDURE — 84133 ASSAY OF URINE POTASSIUM: CPT

## 2018-04-22 PROCEDURE — 82787 IGG 1 2 3 OR 4 EACH: CPT | Mod: 91

## 2018-04-22 PROCEDURE — 700102 HCHG RX REV CODE 250 W/ 637 OVERRIDE(OP): Performed by: INTERNAL MEDICINE

## 2018-04-22 PROCEDURE — 81001 URINALYSIS AUTO W/SCOPE: CPT

## 2018-04-22 PROCEDURE — 84484 ASSAY OF TROPONIN QUANT: CPT

## 2018-04-22 PROCEDURE — 700105 HCHG RX REV CODE 258: Performed by: EMERGENCY MEDICINE

## 2018-04-22 RX ORDER — DIPHENHYDRAMINE HCL 25 MG
25 TABLET ORAL EVERY 6 HOURS PRN
COMMUNITY

## 2018-04-22 RX ORDER — UREA 10 %
LOTION (ML) TOPICAL
COMMUNITY

## 2018-04-22 RX ORDER — ATENOLOL 50 MG/1
25 TABLET ORAL
Status: DISCONTINUED | OUTPATIENT
Start: 2018-04-23 | End: 2018-04-23

## 2018-04-22 RX ORDER — SODIUM CHLORIDE 9 MG/ML
1000 INJECTION, SOLUTION INTRAVENOUS ONCE
Status: COMPLETED | OUTPATIENT
Start: 2018-04-22 | End: 2018-04-22

## 2018-04-22 RX ORDER — CEFTRIAXONE 1 G/1
1 INJECTION, POWDER, FOR SOLUTION INTRAMUSCULAR; INTRAVENOUS ONCE
Status: COMPLETED | OUTPATIENT
Start: 2018-04-22 | End: 2018-04-22

## 2018-04-22 RX ORDER — SODIUM CHLORIDE 9 MG/ML
INJECTION, SOLUTION INTRAVENOUS CONTINUOUS
Status: DISCONTINUED | OUTPATIENT
Start: 2018-04-22 | End: 2018-04-25

## 2018-04-22 RX ORDER — BUDESONIDE AND FORMOTEROL FUMARATE DIHYDRATE 160; 4.5 UG/1; UG/1
2 AEROSOL RESPIRATORY (INHALATION) 2 TIMES DAILY
Status: DISCONTINUED | OUTPATIENT
Start: 2018-04-23 | End: 2018-04-30

## 2018-04-22 RX ORDER — SODIUM CHLORIDE 9 MG/ML
500 INJECTION, SOLUTION INTRAVENOUS ONCE
Status: COMPLETED | OUTPATIENT
Start: 2018-04-22 | End: 2018-04-22

## 2018-04-22 RX ORDER — SODIUM CHLORIDE 9 MG/ML
1000 INJECTION, SOLUTION INTRAVENOUS CONTINUOUS
Status: DISCONTINUED | OUTPATIENT
Start: 2018-04-22 | End: 2018-04-22

## 2018-04-22 RX ORDER — TIOTROPIUM BROMIDE 18 UG/1
1 CAPSULE ORAL; RESPIRATORY (INHALATION) DAILY
Status: DISCONTINUED | OUTPATIENT
Start: 2018-04-23 | End: 2018-04-30

## 2018-04-22 RX ORDER — TIOTROPIUM BROMIDE 18 UG/1
1 CAPSULE ORAL; RESPIRATORY (INHALATION)
Status: DISCONTINUED | OUTPATIENT
Start: 2018-04-23 | End: 2018-04-22

## 2018-04-22 RX ORDER — BUDESONIDE AND FORMOTEROL FUMARATE DIHYDRATE 160; 4.5 UG/1; UG/1
2 AEROSOL RESPIRATORY (INHALATION)
Status: DISCONTINUED | OUTPATIENT
Start: 2018-04-22 | End: 2018-04-22

## 2018-04-22 RX ADMIN — SODIUM CHLORIDE 1000 ML: 9 INJECTION, SOLUTION INTRAVENOUS at 16:49

## 2018-04-22 RX ADMIN — SODIUM CHLORIDE: 9 INJECTION, SOLUTION INTRAVENOUS at 21:35

## 2018-04-22 RX ADMIN — DOXYCYCLINE 100 MG: 100 INJECTION, POWDER, LYOPHILIZED, FOR SOLUTION INTRAVENOUS at 18:21

## 2018-04-22 RX ADMIN — PHYTONADIONE 10 MG: 10 INJECTION, EMULSION INTRAMUSCULAR; INTRAVENOUS; SUBCUTANEOUS at 23:03

## 2018-04-22 RX ADMIN — CEFTRIAXONE SODIUM 1 G: 1 INJECTION, POWDER, FOR SOLUTION INTRAMUSCULAR; INTRAVENOUS at 18:12

## 2018-04-22 RX ADMIN — SODIUM CHLORIDE 500 ML: 9 INJECTION, SOLUTION INTRAVENOUS at 14:56

## 2018-04-22 ASSESSMENT — ENCOUNTER SYMPTOMS
HEADACHES: 0
WEAKNESS: 0
WEAKNESS: 1
ABDOMINAL PAIN: 0
PALPITATIONS: 0
EYE DISCHARGE: 0
CHILLS: 0
NECK PAIN: 0
BLURRED VISION: 0
SHORTNESS OF BREATH: 0
SINUS PAIN: 0
SEIZURES: 0
PHOTOPHOBIA: 0
DIZZINESS: 1
SORE THROAT: 0
BACK PAIN: 1
FALLS: 1
EYE REDNESS: 0
FEVER: 0
ORTHOPNEA: 0
VOMITING: 0
DIARRHEA: 0
NAUSEA: 0
FOCAL WEAKNESS: 0
COUGH: 0
BLOOD IN STOOL: 0
CONSTIPATION: 0

## 2018-04-22 ASSESSMENT — LIFESTYLE VARIABLES
TOTAL SCORE: 4
ON A TYPICAL DAY WHEN YOU DRINK ALCOHOL HOW MANY DRINKS DO YOU HAVE: 4
DOES PATIENT WANT TO STOP DRINKING: YES
TOTAL SCORE: 4
DOES PATIENT WANT TO TALK TO SOMEONE ABOUT QUITTING: NO
HOW MANY TIMES IN THE PAST YEAR HAVE YOU HAD 5 OR MORE DRINKS IN A DAY: 365
HAVE PEOPLE ANNOYED YOU BY CRITICIZING YOUR DRINKING: YES
HAVE YOU EVER FELT YOU SHOULD CUT DOWN ON YOUR DRINKING: YES
AVERAGE NUMBER OF DAYS PER WEEK YOU HAVE A DRINK CONTAINING ALCOHOL: 7
EVER_SMOKED: YES
EVER HAD A DRINK FIRST THING IN THE MORNING TO STEADY YOUR NERVES TO GET RID OF A HANGOVER: YES
CONSUMPTION TOTAL: POSITIVE
DO YOU DRINK ALCOHOL: YES
TOTAL SCORE: 4
EVER FELT BAD OR GUILTY ABOUT YOUR DRINKING: YES

## 2018-04-22 ASSESSMENT — PAIN SCALES - GENERAL
PAINLEVEL_OUTOF10: 0

## 2018-04-22 ASSESSMENT — COPD QUESTIONNAIRES
DURING THE PAST 4 WEEKS HOW MUCH DID YOU FEEL SHORT OF BREATH: SOME OF THE TIME
DO YOU EVER COUGH UP ANY MUCUS OR PHLEGM?: NO/ONLY WITH OCCASIONAL COLDS OR INFECTIONS
COPD SCREENING SCORE: 6
HAVE YOU SMOKED AT LEAST 100 CIGARETTES IN YOUR ENTIRE LIFE: YES

## 2018-04-22 ASSESSMENT — PATIENT HEALTH QUESTIONNAIRE - PHQ9
SUM OF ALL RESPONSES TO PHQ9 QUESTIONS 1 AND 2: 0
1. LITTLE INTEREST OR PLEASURE IN DOING THINGS: NOT AT ALL
2. FEELING DOWN, DEPRESSED, IRRITABLE, OR HOPELESS: NOT AT ALL

## 2018-04-22 NOTE — ED NOTES
Tech from Lab called with critical result of Lactic Acid 4.4 at 1505. Critical lab result read back to .   Dr. Baum notified of critical lab result at 1507.  Critical lab result read back by Dr. Baum.

## 2018-04-22 NOTE — ED PROVIDER NOTES
ED Provider Note    CHIEF COMPLAINT  Chief Complaint   Patient presents with   • Dizziness     States dizziness off and on upon standing x 3 days.   • Itching     States itching, all over body for two months   • Jaundice      states x 3 days but no hx of liver problems   • Hypotension       HPI  Rox Valdez is a 77 y.o. female with history of atrial fibrillation on Elquis, COPD, hypertension who presents with increasing confusion, weakness over the last few days.  Patient presents with her family who have noticed this confusion developing in addition to generalized weakness. They have also noticed increasing yellowing of her skin which has occurred over the last 3 days. She denies associated recent fevers or infectious symptoms. No current complaints of pain at this time. No chest pain or shortness of breath. She has no known history of liver failure however has a significant alcohol abuse history.  Reports itching to the abdomen which has been ongoing for some time.  Seen by dermatology and allergist without diagnosis.  History of lower back pain for the last few months after a fall.  She does endorse a history of GI bleeding causing anemia and requiring transfusion.       REVIEW OF SYSTEMS  See HPI for further details.   Review of Systems   Constitutional: Negative for chills and fever.   HENT: Negative for sore throat.    Eyes: Negative for blurred vision and redness.   Respiratory: Negative for cough and shortness of breath.    Cardiovascular: Negative for chest pain and leg swelling.   Gastrointestinal: Negative for abdominal pain and vomiting.   Genitourinary: Negative for dysuria and urgency.   Musculoskeletal: Positive for back pain. Negative for neck pain.   Skin: Positive for itching. Negative for rash.   Neurological: Positive for weakness. Negative for focal weakness, seizures and headaches.   Psychiatric/Behavioral: Negative for suicidal ideas.         PAST MEDICAL HISTORY   has a past  "medical history of Atrial fibrillation (CMS-[facility]); COPD (chronic obstructive pulmonary disease) (CMS-[facility]); Cor pulmonale (CMS-[facility]); HTN (hypertension); Long term (current) use of anticoagulants; PVC (premature ventricular contraction); and Unspecified menopausal and postmenopausal disorder.    SOCIAL HISTORY  Social History     Social History Main Topics   • Smoking status: Former Smoker     Packs/day: 1.00     Years: 28.00     Types: Cigarettes     Start date: 1/1/1953     Quit date: 1/1/1981   • Smokeless tobacco: Never Used   • Alcohol use 3.0 - 6.0 oz/week     5 - 10 Standard drinks or equivalent per week      Comment: 2-3 glasses of wine daily. 21 drinks of wine per week   • Drug use: No   • Sexual activity: Not on file       SURGICAL HISTORY   has a past surgical history that includes cataract extraction with iol; cholecystectomy; knee arthroscopy; and retinal detachment repair.    CURRENT MEDICATIONS  Home Medications     Reviewed by Rachna Smith R.N. (Registered Nurse) on 04/22/18 at 2151  Med List Status: Complete   Medication Last Dose Status   atenolol (TENORMIN) 50 MG Tab 4/22/2018 Active   budesonide-formoterol (SYMBICORT) 160-4.5 MCG/ACT Aerosol 4/22/2018 Active   diphenhydrAMINE (BENADRYL) 25 MG Tab  Active   Diphenhydramine-APAP, sleep, (TYLENOL PM EXTRA STRENGTH PO)  Active   ELIQUIS 5 MG Tab 4/22/2018 Active   losartan (COZAAR) 25 MG Tab 4/22/2018 Active   Melatonin 1 MG Tab  Active   spironolactone (ALDACTONE) 25 MG Tab 4/22/2018 Active   tiotropium (SPIRIVA HANDIHALER) 18 MCG Cap 4/22/2018 Active                ALLERGIES  No Known Allergies    PHYSICAL EXAM   VITAL SIGNS: BP (!) 83/56   Pulse 86   Temp 35.9 °C (96.6 °F)   Resp 16   Ht 1.575 m (5' 2\")   Wt 63.6 kg (140 lb 3.4 oz)   SpO2 91%   Breastfeeding? No   BMI 25.65 kg/m²      Physical Exam   Constitutional: She is oriented to person, place, and time and well-developed, well-nourished, and in no distress. No distress.   Elderly, " nontoxic appearing female   HENT:   Head: Normocephalic and atraumatic.   Eyes: Conjunctivae are normal. Pupils are equal, round, and reactive to light.   Scleral iceterus   Neck: Normal range of motion. Neck supple.   Cardiovascular: Normal heart sounds.    Irregularly irregular rhythm   Pulmonary/Chest: Effort normal and breath sounds normal. No respiratory distress.   Abdominal: Soft. She exhibits no distension. There is no tenderness.   Musculoskeletal: Normal range of motion. She exhibits no edema or tenderness.   Neurological: She is alert and oriented to person, place, and time. Coordination normal. GCS score is 15.   Moving all extremities spontaneously  Possible right-sided facial droop and mild right pronator drift, unknown time of onset.  Otherwise strength 5/5 throughout, sensation intact   Skin: Skin is warm and dry.   Severe jaundice   Psychiatric: Mood and affect normal.         DIAGNOSTIC STUDIES    EKG  EKG personally reviewed by myself in the absence of a cardiologist showed:  Atrial fibrillation at 105  Right axis devation, QTc 519  Multiple PVCs present  No ST change, T-wave inversions in inferior leads  No prior for comparison  Impression: Atrial fibrillation with PVCs and T-wave changes in inferior leads, no STEMI        LABS  Personally reviewed by me  Labs Reviewed   CBC WITH DIFFERENTIAL - Abnormal; Notable for the following:        Result Value    RBC 3.72 (*)     Hematocrit 36.6 (*)     MCV 98.4 (*)     MCH 34.1 (*)     RDW 61.5 (*)     Platelet Count 134 (*)     Neutrophils-Polys 72.30 (*)     Lymphocytes 7.40 (*)     Monocytes 16.30 (*)     Immature Granulocytes 1.50 (*)     Lymphs (Absolute) 0.67 (*)     Monos (Absolute) 1.48 (*)     Immature Granulocytes (abs) 0.14 (*)     All other components within normal limits    Narrative:     Indicate which anticoagulants the patient is on:->UNKNOWN   COMP METABOLIC PANEL - Abnormal; Notable for the following:     Sodium 127 (*)     Co2 16 (*)   "   Anion Gap 12.0 (*)     Bun 48 (*)     Creatinine 2.08 (*)     AST(SGOT) 831 (*)     ALT(SGPT) 365 (*)     Total Bilirubin 19.2 (*)     Albumin 2.1 (*)     Total Protein 5.3 (*)     All other components within normal limits    Narrative:     Indicate which anticoagulants the patient is on:->UNKNOWN   APTT - Abnormal; Notable for the following:     APTT 53.0 (*)     All other components within normal limits    Narrative:     Indicate which anticoagulants the patient is on:->UNKNOWN   PROTHROMBIN TIME - Abnormal; Notable for the following:     PT 53.7 (*)     INR 6.03 (*)     All other components within normal limits    Narrative:     Indicate which anticoagulants the patient is on:->UNKNOWN   LACTIC ACID - Abnormal; Notable for the following:     Lactic Acid 4.4 (*)     All other components within normal limits    Narrative:     Indicate which anticoagulants the patient is on:->UNKNOWN   AMMONIA - Abnormal; Notable for the following:     Ammonia 51 (*)     All other components within normal limits   ESTIMATED GFR - Abnormal; Notable for the following:     GFR If  28 (*)     GFR If Non  23 (*)     All other components within normal limits    Narrative:     Indicate which anticoagulants the patient is on:->UNKNOWN   LACTIC ACID - Abnormal; Notable for the following:     Lactic Acid 2.3 (*)     All other components within normal limits   TROPONIN    Narrative:     Indicate which anticoagulants the patient is on:->UNKNOWN   BLOOD CULTURE    Narrative:     Per Hospital Policy: Only change Specimen Src: to \"Line\" if  specified by physician order.   BLOOD CULTURE    Narrative:     Per Hospital Policy: Only change Specimen Src: to \"Line\" if  specified by physician order.   ACETAMINOPHEN    Narrative:     Indicate which anticoagulants the patient is on:->UNKNOWN   HEPATITIS PANEL ACUTE(4 COMPONENTS)    Narrative:     Indicate which anticoagulants the patient is on:->UNKNOWN   MAGNESIUM    " Narrative:     Indicate which anticoagulants the patient is on:->UNKNOWN   URINALYSIS,CULTURE IF INDICATED   HIV AG/AB COMBO ASSAY SCREENING   CREATINE KINASE   PLATELET MAPPING WITH BASIC TEG   PROCALCITONIN   IRON/TOTAL IRON BIND   FERRITIN   TRANSFERRIN   URINE SODIUM RANDOM   URINE POTASSIUM RANDOM   URINE CHLORIDE RANDOM   URINE CREATININE RANDOM   URINE PROTEIN   FLUID UREA NITROGEN   OCCULT BLOOD STOOL   URINE DRUG SCREEN   MITOCHONDRIAL (M2) AB   IGG SUBCLASSES (1234)   CERULOPLASMIN   COPPER, SERUM           RADIOLOGY  Personally reviewed by me  US-GALLBLADDER   Final Result      1.  Lobular liver surface and heterogeneous liver echogenicity are consistent with cirrhosis.      2.  Post cholecystectomy.      3.  No biliary ductal dilatation appreciated.         DX-CHEST-PORTABLE (1 VIEW)   Final Result         1.  Unusual wedge-shaped opacification noted in right lower hemithorax adjacent to the right heart border. This could represent collapse of a portion of the right lung. Underlying carcinoma is a possibility. Fibrosis pneumonia or loculated fluid are also    possibilities.      2.  Minimal blunting of costophrenic angles bilaterally.      DX-LUMBAR SPINE-2 OR 3 VIEWS   Final Result      1.  Moderate anterior wedge compression fracture mainly affecting the superior endplate of L1 is identified with some retropulsion as described above. This could potentially represent a chronic or old traumatic fracture or insufficiency fracture. Acute    fracture is probably less likely given smoothly marginated deformed superior endplate.      2.  Moderate degenerative disc disease and facet arthropathy.      CT-HEAD W/O   Final Result      No acute intracranial abnormality is identified.      Atrophy      There are mild periventricular and subcortical white matter changes present.  This finding is nonspecific and could be from previous small vessel ischemia, demyelination, or gliosis.      US-RENAL    (Results  "Pending)         ED COURSE  Vitals:    04/22/18 2125 04/22/18 2145 04/22/18 2200 04/22/18 2215   BP:       Pulse:  (!) 101 95 86   Resp:  (!) 21 19 16   Temp:       SpO2:  96% 97% 91%   Weight: 63.6 kg (140 lb 3.4 oz)      Height: 1.575 m (5' 2\")            Medications administered:  IVF, ceftriaxone, doxycycline        MEDICAL DECISION MAKING  Patient with history of atrial fibrillation on Eliquis, hypertension and COPD who presents with 3 day history of increasing confusion, generalized weakness and obvious jaundice. She is afebrile, tachycardic on arrival.  Blood pressures were initially low however responded well to fluids. She has evidence of right-sided facial droop and possible right pronator drift on exam however no known time of onset, therefore doubt acute CVA. This should further be investigated by MRI as an inpatient. CT head is negative for intracranial hemorrhage or mass. EKG demonstrates evidence of atrial fibrillation without ischemia, troponin is negative, doubt ACS. Patient has an elevated lactate at 4.4 and no focal signs of sepsis at this time. She was given gentle fluid resuscitation due to her history of liver failure with an improvement in her lactate. Blood cultures were drawn and antibiotics were given to cover for possible pneumonia on chest x-ray. She has no evidence of pneumothorax or pulmonary edema.  Urinalysis is pending at the time of admission. She has significant hyperbilirubinemia consistent with liver failure as well as significantly elevated INR without signs of active bleeding therefore she will not be reversed at this time. Ultrasound shows evidence of cirrhosis, she has had prior cholecystectomy.  Tylenol and hepatitis panels are negative.  She has evidence of acute renal failure with elevated creatinine and no other electrolyte abnormalities. I suspect that she has liver failure and acute hepatitis due to alcoholic cirrhosis.    I discussed the case with both floor and ICU " internal medicine residents. She ultimately will be admitted to the ICU. Patient and family were updated on plan of care. She is stable at this time for transfer to the ICU.      CRITICAL CARE TIME  Upon my evaluation, this patient had a high probability of imminent or life-threatening deterioration due to lactic acidosis, acute liver failure, acute encephalopathy, coagulopathy which required my direct attention, intervention, and personal management.     I personally provided 45 minutes of total critical care time outside of time spent on separately billable/documented procedures. Time includes: review of laboratory data, review of radiology studies, discussion with consultants, discussion with family/patient, monitoring for potential decompensation.  Interventions were performed as documented above.         IMPRESSION  (E80.6) Hyperbilirubinemia  (R74.0) Transaminitis  (D68.9) Coagulopathy (CMS-[facility])  (N17.9) BLAYNE (acute kidney injury) (CMS-[facility])  (I48.91) Atrial fibrillation, unspecified type (CMS-[facility])  (S32.010A) Closed compression fracture of first lumbar vertebra, initial encounter (CMS-[facility])    Disposition: Admit ICU, critical condition  Results, diagnoses, and treatment options were discussed with the patient and/or family. Patient verbalized understanding of plan of care.    Patient referred to primary care provider for monitoring and treatment of blood pressure.      Current Discharge Medication List              Electronically signed by: Amita Baum, 4/22/2018 2:27 PM

## 2018-04-22 NOTE — ED TRIAGE NOTES
Pt BIB EMS from home.  Pt states she's been getting dizzy when standing for about the last 3 days.  Pt very jaundice and  states he noted that 3 days ago and pt has no hx of liver problems.  Per EMS, bp was 60s over 40-50s enroute with a-fib in the 1teens, which is normal for pt.

## 2018-04-23 ENCOUNTER — APPOINTMENT (OUTPATIENT)
Dept: RADIOLOGY | Facility: MEDICAL CENTER | Age: 78
DRG: 441 | End: 2018-04-23
Attending: HOSPITALIST
Payer: MEDICARE

## 2018-04-23 ENCOUNTER — APPOINTMENT (OUTPATIENT)
Dept: RADIOLOGY | Facility: MEDICAL CENTER | Age: 78
DRG: 441 | End: 2018-04-23
Attending: INTERNAL MEDICINE
Payer: MEDICARE

## 2018-04-23 LAB
ALBUMIN SERPL BCP-MCNC: 1.9 G/DL (ref 3.2–4.9)
ALBUMIN/GLOB SERPL: 0.7 G/DL
ALP SERPL-CCNC: 63 U/L (ref 30–99)
ALT SERPL-CCNC: 324 U/L (ref 2–50)
AMMONIA PLAS-SCNC: 51 UMOL/L (ref 11–45)
AMPHET UR QL SCN: NEGATIVE
ANION GAP SERPL CALC-SCNC: 4 MMOL/L (ref 0–11.9)
APPEARANCE UR: ABNORMAL
AST SERPL-CCNC: 661 U/L (ref 12–45)
BACTERIA #/AREA URNS HPF: ABNORMAL /HPF
BARBITURATES UR QL SCN: NEGATIVE
BASOPHILS # BLD AUTO: 0.6 % (ref 0–1.8)
BASOPHILS # BLD AUTO: 0.7 % (ref 0–1.8)
BASOPHILS # BLD: 0.05 K/UL (ref 0–0.12)
BASOPHILS # BLD: 0.05 K/UL (ref 0–0.12)
BENZODIAZ UR QL SCN: NEGATIVE
BILIRUB SERPL-MCNC: 16.9 MG/DL (ref 0.1–1.5)
BILIRUB UR QL STRIP.AUTO: ABNORMAL
BODY FLD TYPE: NORMAL
BUN SERPL-MCNC: 42 MG/DL (ref 8–22)
BZE UR QL SCN: NEGATIVE
CALCIUM SERPL-MCNC: 8 MG/DL (ref 8.5–10.5)
CANNABINOIDS UR QL SCN: NEGATIVE
CFT BLD TEG: 6.2 MIN (ref 5–10)
CHLORIDE SERPL-SCNC: 105 MMOL/L (ref 96–112)
CHLORIDE UR-SCNC: 51 MMOL/L
CLOT ANGLE BLD TEG: 66.2 DEGREES (ref 53–72)
CLOT LYSIS 30M P MA LENFR BLD TEG: 0 % (ref 0–8)
CO2 SERPL-SCNC: 21 MMOL/L (ref 20–33)
COLOR UR: ABNORMAL
CORTIS SERPL-MCNC: 3.7 UG/DL (ref 0–23)
CREAT SERPL-MCNC: 1.89 MG/DL (ref 0.5–1.4)
CREAT UR-MCNC: 72.3 MG/DL
CT.EXTRINSIC BLD ROTEM: 1.8 MIN (ref 1–3)
EOSINOPHIL # BLD AUTO: 0.22 K/UL (ref 0–0.51)
EOSINOPHIL # BLD AUTO: 0.25 K/UL (ref 0–0.51)
EOSINOPHIL NFR BLD: 2.9 % (ref 0–6.9)
EOSINOPHIL NFR BLD: 3.2 % (ref 0–6.9)
EPI CELLS #/AREA URNS HPF: ABNORMAL /HPF
ERYTHROCYTE [DISTWIDTH] IN BLOOD BY AUTOMATED COUNT: 64 FL (ref 35.9–50)
ERYTHROCYTE [DISTWIDTH] IN BLOOD BY AUTOMATED COUNT: 64.2 FL (ref 35.9–50)
FERRITIN SERPL-MCNC: 1087.1 NG/ML (ref 10–291)
GLOBULIN SER CALC-MCNC: 2.7 G/DL (ref 1.9–3.5)
GLUCOSE SERPL-MCNC: 99 MG/DL (ref 65–99)
GLUCOSE UR STRIP.AUTO-MCNC: NEGATIVE MG/DL
HBV CORE AB SERPL QL IA: NEGATIVE
HBV SURFACE AB SERPL IA-ACNC: <3.1 MIU/ML (ref 0–10)
HBV SURFACE AG SER QL: NEGATIVE
HCT VFR BLD AUTO: 33.7 % (ref 37–47)
HCT VFR BLD AUTO: 34.1 % (ref 37–47)
HGB BLD-MCNC: 11.6 G/DL (ref 12–16)
HGB BLD-MCNC: 11.9 G/DL (ref 12–16)
HIV 1+2 AB+HIV1 P24 AG SERPL QL IA: NON REACTIVE
HYALINE CASTS #/AREA URNS LPF: ABNORMAL /LPF
IMM GRANULOCYTES # BLD AUTO: 0.08 K/UL (ref 0–0.11)
IMM GRANULOCYTES # BLD AUTO: 0.14 K/UL (ref 0–0.11)
IMM GRANULOCYTES NFR BLD AUTO: 1.1 % (ref 0–0.9)
IMM GRANULOCYTES NFR BLD AUTO: 1.8 % (ref 0–0.9)
KETONES UR STRIP.AUTO-MCNC: NEGATIVE MG/DL
LACTATE BLD-SCNC: 1.6 MMOL/L (ref 0.5–2)
LACTATE BLD-SCNC: 1.6 MMOL/L (ref 0.5–2)
LEUKOCYTE ESTERASE UR QL STRIP.AUTO: ABNORMAL
LYMPHOCYTES # BLD AUTO: 0.59 K/UL (ref 1–4.8)
LYMPHOCYTES # BLD AUTO: 0.8 K/UL (ref 1–4.8)
LYMPHOCYTES NFR BLD: 10.6 % (ref 22–41)
LYMPHOCYTES NFR BLD: 7.5 % (ref 22–41)
MCF BLD TEG: 55.4 MM (ref 50–70)
MCH RBC QN AUTO: 34.3 PG (ref 27–33)
MCH RBC QN AUTO: 34.5 PG (ref 27–33)
MCHC RBC AUTO-ENTMCNC: 34.4 G/DL (ref 33.6–35)
MCHC RBC AUTO-ENTMCNC: 34.9 G/DL (ref 33.6–35)
MCV RBC AUTO: 98.8 FL (ref 81.4–97.8)
MCV RBC AUTO: 99.7 FL (ref 81.4–97.8)
METHADONE UR QL SCN: NEGATIVE
MICRO URNS: ABNORMAL
MONOCYTES # BLD AUTO: 1.02 K/UL (ref 0–0.85)
MONOCYTES # BLD AUTO: 1.29 K/UL (ref 0–0.85)
MONOCYTES NFR BLD AUTO: 13.5 % (ref 0–13.4)
MONOCYTES NFR BLD AUTO: 16.4 % (ref 0–13.4)
NEUTROPHILS # BLD AUTO: 5.36 K/UL (ref 2–7.15)
NEUTROPHILS # BLD AUTO: 5.53 K/UL (ref 2–7.15)
NEUTROPHILS NFR BLD: 70.5 % (ref 44–72)
NEUTROPHILS NFR BLD: 71.2 % (ref 44–72)
NITRITE UR QL STRIP.AUTO: NEGATIVE
NRBC # BLD AUTO: 0 K/UL
NRBC # BLD AUTO: 0 K/UL
NRBC BLD-RTO: 0 /100 WBC
NRBC BLD-RTO: 0 /100 WBC
OPIATES UR QL SCN: NEGATIVE
OXYCODONE UR QL SCN: NEGATIVE
PA AA BLD-ACNC: 48.4 %
PA ADP BLD-ACNC: 67 %
PCP UR QL SCN: NEGATIVE
PH UR STRIP.AUTO: 5.5 [PH]
PLATELET # BLD AUTO: 136 K/UL (ref 164–446)
PLATELET # BLD AUTO: 146 K/UL (ref 164–446)
PMV BLD AUTO: 10.9 FL (ref 9–12.9)
PMV BLD AUTO: 11.4 FL (ref 9–12.9)
POTASSIUM SERPL-SCNC: 5.5 MMOL/L (ref 3.6–5.5)
POTASSIUM UR-SCNC: 33.8 MMOL/L
PROCALCITONIN SERPL-MCNC: 0.75 NG/ML
PROPOXYPH UR QL SCN: NEGATIVE
PROT SERPL-MCNC: 4.6 G/DL (ref 6–8.2)
PROT UR QL STRIP: NEGATIVE MG/DL
PROT UR-MCNC: 16.2 MG/DL (ref 0–15)
RBC # BLD AUTO: 3.38 M/UL (ref 4.2–5.4)
RBC # BLD AUTO: 3.45 M/UL (ref 4.2–5.4)
RBC # URNS HPF: ABNORMAL /HPF
RBC UR QL AUTO: ABNORMAL
SODIUM SERPL-SCNC: 130 MMOL/L (ref 135–145)
SODIUM UR-SCNC: 52 MMOL/L
SP GR UR STRIP.AUTO: 1.01
TEG ALGORITHM TGALG: NORMAL
TRANSFERRIN SERPL-MCNC: 121 MG/DL (ref 200–370)
TROPONIN I SERPL-MCNC: 0.02 NG/ML (ref 0–0.04)
UREA NIT FLD-MCNC: 578 MG/DL
UROBILINOGEN UR STRIP.AUTO-MCNC: 1 MG/DL
WBC # BLD AUTO: 7.5 K/UL (ref 4.8–10.8)
WBC # BLD AUTO: 7.9 K/UL (ref 4.8–10.8)
WBC #/AREA URNS HPF: ABNORMAL /HPF

## 2018-04-23 PROCEDURE — 87522 HEPATITIS C REVRS TRNSCRPJ: CPT

## 2018-04-23 PROCEDURE — 700105 HCHG RX REV CODE 258: Performed by: INTERNAL MEDICINE

## 2018-04-23 PROCEDURE — 85347 COAGULATION TIME ACTIVATED: CPT

## 2018-04-23 PROCEDURE — 700105 HCHG RX REV CODE 258

## 2018-04-23 PROCEDURE — 86038 ANTINUCLEAR ANTIBODIES: CPT

## 2018-04-23 PROCEDURE — A9270 NON-COVERED ITEM OR SERVICE: HCPCS | Performed by: HOSPITALIST

## 2018-04-23 PROCEDURE — 87040 BLOOD CULTURE FOR BACTERIA: CPT

## 2018-04-23 PROCEDURE — 76775 US EXAM ABDO BACK WALL LIM: CPT

## 2018-04-23 PROCEDURE — A9270 NON-COVERED ITEM OR SERVICE: HCPCS | Performed by: INTERNAL MEDICINE

## 2018-04-23 PROCEDURE — 700102 HCHG RX REV CODE 250 W/ 637 OVERRIDE(OP): Performed by: HOSPITALIST

## 2018-04-23 PROCEDURE — 85384 FIBRINOGEN ACTIVITY: CPT

## 2018-04-23 PROCEDURE — 85576 BLOOD PLATELET AGGREGATION: CPT

## 2018-04-23 PROCEDURE — 86235 NUCLEAR ANTIGEN ANTIBODY: CPT

## 2018-04-23 PROCEDURE — 82140 ASSAY OF AMMONIA: CPT

## 2018-04-23 PROCEDURE — 84484 ASSAY OF TROPONIN QUANT: CPT

## 2018-04-23 PROCEDURE — 87340 HEPATITIS B SURFACE AG IA: CPT

## 2018-04-23 PROCEDURE — 84466 ASSAY OF TRANSFERRIN: CPT

## 2018-04-23 PROCEDURE — 700101 HCHG RX REV CODE 250: Performed by: INTERNAL MEDICINE

## 2018-04-23 PROCEDURE — 700105 HCHG RX REV CODE 258: Performed by: HOSPITALIST

## 2018-04-23 PROCEDURE — 36556 INSERT NON-TUNNEL CV CATH: CPT

## 2018-04-23 PROCEDURE — 86706 HEP B SURFACE ANTIBODY: CPT

## 2018-04-23 PROCEDURE — 82533 TOTAL CORTISOL: CPT

## 2018-04-23 PROCEDURE — 82103 ALPHA-1-ANTITRYPSIN TOTAL: CPT

## 2018-04-23 PROCEDURE — 83516 IMMUNOASSAY NONANTIBODY: CPT

## 2018-04-23 PROCEDURE — 85025 COMPLETE CBC W/AUTO DIFF WBC: CPT

## 2018-04-23 PROCEDURE — 700102 HCHG RX REV CODE 250 W/ 637 OVERRIDE(OP): Performed by: INTERNAL MEDICINE

## 2018-04-23 PROCEDURE — B548ZZA ULTRASONOGRAPHY OF SUPERIOR VENA CAVA, GUIDANCE: ICD-10-PCS | Performed by: INTERNAL MEDICINE

## 2018-04-23 PROCEDURE — 84145 PROCALCITONIN (PCT): CPT

## 2018-04-23 PROCEDURE — C1751 CATH, INF, PER/CENT/MIDLINE: HCPCS

## 2018-04-23 PROCEDURE — 83605 ASSAY OF LACTIC ACID: CPT | Mod: 91

## 2018-04-23 PROCEDURE — 71045 X-RAY EXAM CHEST 1 VIEW: CPT

## 2018-04-23 PROCEDURE — 700111 HCHG RX REV CODE 636 W/ 250 OVERRIDE (IP): Performed by: HOSPITALIST

## 2018-04-23 PROCEDURE — 700111 HCHG RX REV CODE 636 W/ 250 OVERRIDE (IP): Performed by: INTERNAL MEDICINE

## 2018-04-23 PROCEDURE — 770022 HCHG ROOM/CARE - ICU (200)

## 2018-04-23 PROCEDURE — 86039 ANTINUCLEAR ANTIBODIES (ANA): CPT

## 2018-04-23 PROCEDURE — 86225 DNA ANTIBODY NATIVE: CPT

## 2018-04-23 PROCEDURE — 02HV33Z INSERTION OF INFUSION DEVICE INTO SUPERIOR VENA CAVA, PERCUTANEOUS APPROACH: ICD-10-PCS | Performed by: INTERNAL MEDICINE

## 2018-04-23 PROCEDURE — 80053 COMPREHEN METABOLIC PANEL: CPT

## 2018-04-23 PROCEDURE — 86704 HEP B CORE ANTIBODY TOTAL: CPT

## 2018-04-23 PROCEDURE — 700101 HCHG RX REV CODE 250: Performed by: HOSPITALIST

## 2018-04-23 RX ORDER — SODIUM CHLORIDE 9 MG/ML
500 INJECTION, SOLUTION INTRAVENOUS PRN
Status: DISCONTINUED | OUTPATIENT
Start: 2018-04-23 | End: 2018-04-23

## 2018-04-23 RX ORDER — SODIUM CHLORIDE 9 MG/ML
INJECTION, SOLUTION INTRAVENOUS
Status: ACTIVE
Start: 2018-04-23 | End: 2018-04-23

## 2018-04-23 RX ORDER — MAGNESIUM SULFATE HEPTAHYDRATE 40 MG/ML
4 INJECTION, SOLUTION INTRAVENOUS ONCE
Status: COMPLETED | OUTPATIENT
Start: 2018-04-23 | End: 2018-04-23

## 2018-04-23 RX ORDER — SODIUM CHLORIDE 9 MG/ML
INJECTION, SOLUTION INTRAVENOUS
Status: COMPLETED
Start: 2018-04-23 | End: 2018-04-23

## 2018-04-23 RX ORDER — TRAZODONE HYDROCHLORIDE 100 MG/1
100 TABLET ORAL
Status: DISCONTINUED | OUTPATIENT
Start: 2018-04-23 | End: 2018-04-30

## 2018-04-23 RX ORDER — THIAMINE MONONITRATE (VIT B1) 100 MG
100 TABLET ORAL DAILY
Status: DISPENSED | OUTPATIENT
Start: 2018-04-24 | End: 2018-04-28

## 2018-04-23 RX ORDER — MIDODRINE HYDROCHLORIDE 5 MG/1
5 TABLET ORAL
Status: DISCONTINUED | OUTPATIENT
Start: 2018-04-23 | End: 2018-04-25

## 2018-04-23 RX ORDER — SODIUM CHLORIDE 9 MG/ML
500 INJECTION, SOLUTION INTRAVENOUS ONCE
Status: COMPLETED | OUTPATIENT
Start: 2018-04-23 | End: 2018-04-23

## 2018-04-23 RX ORDER — FOLIC ACID 1 MG/1
1 TABLET ORAL DAILY
Status: DISPENSED | OUTPATIENT
Start: 2018-04-24 | End: 2018-04-28

## 2018-04-23 RX ADMIN — SODIUM CHLORIDE 500 ML: 9 INJECTION, SOLUTION INTRAVENOUS at 14:20

## 2018-04-23 RX ADMIN — SODIUM CHLORIDE 500 ML: 9 INJECTION, SOLUTION INTRAVENOUS at 09:10

## 2018-04-23 RX ADMIN — CEFTRIAXONE SODIUM 1 G: 10 INJECTION, POWDER, FOR SOLUTION INTRAVENOUS at 17:00

## 2018-04-23 RX ADMIN — MIDODRINE HYDROCHLORIDE 5 MG: 5 TABLET ORAL at 11:10

## 2018-04-23 RX ADMIN — TIOTROPIUM BROMIDE 1 CAPSULE: 18 CAPSULE ORAL; RESPIRATORY (INHALATION) at 12:27

## 2018-04-23 RX ADMIN — SODIUM CHLORIDE 500 ML: 9 INJECTION, SOLUTION INTRAVENOUS at 16:33

## 2018-04-23 RX ADMIN — MIDODRINE HYDROCHLORIDE 5 MG: 5 TABLET ORAL at 08:00

## 2018-04-23 RX ADMIN — NOREPINEPHRINE BITARTRATE 10 MCG/MIN: 1 INJECTION INTRAVENOUS at 16:18

## 2018-04-23 RX ADMIN — MAGNESIUM SULFATE IN WATER 4 G: 40 INJECTION, SOLUTION INTRAVENOUS at 11:03

## 2018-04-23 RX ADMIN — FOLIC ACID: 5 INJECTION, SOLUTION INTRAMUSCULAR; INTRAVENOUS; SUBCUTANEOUS at 08:02

## 2018-04-23 RX ADMIN — BUDESONIDE AND FORMOTEROL FUMARATE DIHYDRATE 2 PUFF: 160; 4.5 AEROSOL RESPIRATORY (INHALATION) at 21:49

## 2018-04-23 RX ADMIN — MIDODRINE HYDROCHLORIDE 5 MG: 5 TABLET ORAL at 16:39

## 2018-04-23 RX ADMIN — SODIUM CHLORIDE: 9 INJECTION, SOLUTION INTRAVENOUS at 16:24

## 2018-04-23 RX ADMIN — SODIUM CHLORIDE: 9 INJECTION, SOLUTION INTRAVENOUS at 08:03

## 2018-04-23 RX ADMIN — BUDESONIDE AND FORMOTEROL FUMARATE DIHYDRATE 2 PUFF: 160; 4.5 AEROSOL RESPIRATORY (INHALATION) at 12:27

## 2018-04-23 RX ADMIN — TRAZODONE HYDROCHLORIDE 100 MG: 100 TABLET ORAL at 01:55

## 2018-04-23 RX ADMIN — DOXYCYCLINE 100 MG: 100 INJECTION, POWDER, LYOPHILIZED, FOR SOLUTION INTRAVENOUS at 21:49

## 2018-04-23 RX ADMIN — SODIUM CHLORIDE 500 ML: 9 INJECTION, SOLUTION INTRAVENOUS at 13:13

## 2018-04-23 ASSESSMENT — ENCOUNTER SYMPTOMS
DIARRHEA: 0
HEMOPTYSIS: 0
SINUS PAIN: 0
EYES NEGATIVE: 1
FALLS: 0
LOSS OF CONSCIOUSNESS: 0
SHORTNESS OF BREATH: 0
DEPRESSION: 0
VOMITING: 0
HALLUCINATIONS: 0
STRIDOR: 0
DIAPHORESIS: 0
INSOMNIA: 1
CHILLS: 0
MUSCULOSKELETAL NEGATIVE: 1
ABDOMINAL PAIN: 0
NAUSEA: 1
ORTHOPNEA: 1
WEAKNESS: 1
PHOTOPHOBIA: 0
SEIZURES: 0
GASTROINTESTINAL NEGATIVE: 1
FEVER: 0
SORE THROAT: 0
SPUTUM PRODUCTION: 0
FOCAL WEAKNESS: 0
WEIGHT LOSS: 0
COUGH: 1
SPEECH CHANGE: 0
PSYCHIATRIC NEGATIVE: 1
HEADACHES: 0
COUGH: 0
SHORTNESS OF BREATH: 1
PALPITATIONS: 0
DIZZINESS: 1
BRUISES/BLEEDS EASILY: 1
BLOOD IN STOOL: 0
EYE PAIN: 0

## 2018-04-23 ASSESSMENT — PAIN SCALES - GENERAL
PAINLEVEL_OUTOF10: 0

## 2018-04-23 NOTE — H&P
Internal Medicine Admitting History and Physical    Note Author: Marie Panchal M.D.       Name Rox Valdez     1940   Age/Sex 77 y.o. female   MRN [id number]   Code Status Full     After 5PM or if no immediate response to page, please call for cross-coverage  Attending/Team: Dr. Milton / [facility] See Patient List for primary contact information  Call [phone] to page    1st Call - Day Intern (R1):    2nd Call - Day Sr. Resident (R2/R3):   Dr. Oliver       Chief Complaint:  Fatigue, lightheadness x 2 days  Itching x months  Jaundice x 2 days  Poor PO intake for many days    HPI:  77 year old female with PMH ox of Atrial fibrillationon apixaban, COPD not on home oxygen presented with complaints of fatigue and dizziness for 2 days. Patient notes she has been doing well overall however in the last few months she developed generalized pruritus, progressively worsening. She saw a dermatologist who started her on topical treatment for about 1 month ago, however the pruritus continued. Over the past 2 days, her  had noted that patient was jaundiced, which is new for the patient. Patient and her family deny history of liver disease or jaundice in the past. Then this morning, patient noted she was lightheaded therefore called for EMS. Triage note indicated BP was 60s/40s with HR in 110s. Improve to 97/54 with IV fluid.    She drinks 2-3 glasses wine per day for many years. She takes Tylenol from time to time, never overdosed per patient. Last dose of tylenol was two pills yesterday, she does not take tylenol every day.    Patient denies fevers, chills, nasal congestion, nausea, vomiting, heartburn, abdominal pain, hematemesis, bright red blood per rectum, melena, diarrhea, constipation. Denies chest pain, shortness of breath, palpitations, edema.     She fell many days ago after tripping on her own foot.    WBC 9.1, H&H is 12.7 and 36.6, MCV 98.4, platelet 134, neutrophils  72.3%, sodium 1.7, potassium 5.1, bicarb 16, Hg 12, P1 48, creatinine 2.08, GFR 23, , , alk phos 73, total bili 19.2, albumin 2.1, total protein 5.3, ammonia 51, INR 6.03, PTT 53.7, APTT 53  LA 4.4-> 2.3, improved with IV fluid  Acute hepatitis panel negative.     In ED, she received 1.5 L NS, ceftriaxone x1, doxycycline 100 mg IV x1.    Review of Systems   Constitutional: Negative for chills and fever.   HENT: Negative for hearing loss, sinus pain and sore throat.    Eyes: Negative for blurred vision, photophobia, discharge and redness.   Respiratory: Negative for cough and shortness of breath.         Dry cough +. Does not require O2 at baseline   Cardiovascular: Negative for chest pain, palpitations, orthopnea and leg swelling.   Gastrointestinal: Negative for abdominal pain, blood in stool, constipation, diarrhea, nausea and vomiting.   Genitourinary: Negative for hematuria.   Musculoskeletal: Positive for falls.        H/o recent fall   Skin: Negative for itching and rash.   Neurological: Positive for dizziness. Negative for focal weakness, seizures, weakness and headaches.             Past Medical History:   Past Medical History:   Diagnosis Date   • Atrial fibrillation (CMS-HCC)    • COPD (chronic obstructive pulmonary disease) (CMS-HCC)    • Cor pulmonale (CMS-HCC)    • HTN (hypertension)    • Long term (current) use of anticoagulants    • PVC (premature ventricular contraction)    • Unspecified menopausal and postmenopausal disorder        Past Surgical History:  Past Surgical History:   Procedure Laterality Date   • CATARACT EXTRACTION WITH IOL     • CHOLECYSTECTOMY      LAPAROSCOPIC.   • KNEE ARTHROSCOPY     • RETINAL DETACHMENT REPAIR         Current Outpatient Medications:  Home Medications     Reviewed by Renetta Ho, Pharmacy Intern (Pharmacy Intern) on 04/22/18 at 1515  Med List Status: Complete   Medication Last Dose Status   atenolol (TENORMIN) 50 MG Tab 4/22/2018 Active  "  budesonide-formoterol (SYMBICORT) 160-4.5 MCG/ACT Aerosol 4/22/2018 Active   ELIQUIS 5 MG Tab 4/22/2018 Active   losartan (COZAAR) 25 MG Tab 4/22/2018 Active   spironolactone (ALDACTONE) 25 MG Tab 4/22/2018 Active   tiotropium (SPIRIVA HANDIHALER) 18 MCG Cap 4/22/2018 Active                Medication Allergy/Sensitivities:  No Known Allergies      Family History:  Family History   Problem Relation Age of Onset   • Hypertension Mother    • Heart Disease Brother      Atrial Fibrillation.       Social History:  Social History     Social History   • Marital status:      Spouse name: N/A   • Number of children: N/A   • Years of education: N/A     Occupational History   • Not on file.     Social History Main Topics   • Smoking status: Former Smoker     Packs/day: 1.00     Years: 28.00     Types: Cigarettes     Start date: 1/1/1953     Quit date: 1/1/1981   • Smokeless tobacco: Never Used   • Alcohol use 3.0 - 6.0 oz/week           Comment: 2-3 glasses of wine daily, 21 drinks per week.   • Drug use: No   • Sexual activity: Not on file     Other Topics Concern   • Not on file     Social History Narrative   • No narrative on file     Living situation: Lives with  in [place].  PCP: VIVIAN REY      Physical Exam     Vitals:    04/22/18 1800 04/22/18 1815 04/22/18 1845 04/22/18 2103   BP:       Pulse: 99 98 (!) 103 (!) 110   Resp: 17 17 17    Temp:       SpO2: 97% 98% 95% 97%   Weight:       Height:         Body mass index is 24.6 kg/m².  BP (!) 83/56   Pulse (!) 110   Temp 35.9 °C (96.6 °F)   Resp 17   Ht 1.575 m (5' 2\")   Wt 61 kg (134 lb 7.7 oz)   SpO2 97%   BMI 24.60 kg/m²   O2 therapy: Pulse Oximetry: 97 %, O2 (LPM): 2, O2 Delivery: Nasal Cannula    Physical Exam   Constitutional: She is oriented to person, place, and time. No distress.   HENT:   Head: Normocephalic and atraumatic.   Mouth/Throat: No oropharyngeal exudate.   Dry mouth   Eyes: Conjunctivae and EOM are normal. Pupils are " equal, round, and reactive to light.   Neck: Normal range of motion. No tracheal deviation present.   Pulmonary/Chest: Effort normal. No stridor. No respiratory distress.   Decreased breath sounds bilaterally.   Abdominal: Soft. There is no tenderness. There is no rebound and no guarding.   Musculoskeletal: She exhibits no edema.   Lymphadenopathy:     She has no cervical adenopathy.   Neurological: She is alert and oriented to person, place, and time. GCS score is 15.   No asterixis   Skin: Skin is warm and dry.   Multiple ecchymosis   Psychiatric: Mood, memory, affect and judgment normal.   Nursing note and vitals reviewed.            Data Review       Old Records Request:   Completed  Current Records review and summary: Completed    Lab Data Review:  Recent Results (from the past 24 hour(s))   EKG NOW    Collection Time: 18  2:20 PM   Result Value Ref Range    Report       [facility]    Test Date:  2018  Pt Name:    NADER GRANT               Department: ER  MRN:        [id number]                      Room:       BL 15  Gender:     Female                       Technician: [id number]  :        1940                   Requested By:MANUELA GUDINO  Order #:    [id number]                    Reading MD:    Measurements  Intervals                                Axis  Rate:       105                          P:  TX:                                      QRS:        105  QRSD:       100                          T:          -37  QT:         392  QTc:        519    Interpretive Statements  ATRIAL FIBRILLATION  MULTIPLE VENTRICULAR PREMATURE COMPLEXES  RIGHT AXIS DEVIATION  CONSIDER ANTEROSEPTAL INFARCT  BORDERLINE T ABNORMALITIES, INFERIOR LEADS  PROLONGED QT INTERVAL  No previous ECG available for comparison     CBC WITH DIFFERENTIAL    Collection Time: 18  2:50 PM   Result Value Ref Range    WBC 9.1 4.8 - 10.8 K/uL    RBC 3.72 (L) 4.20 - 5.40 M/uL    Hemoglobin 12.7  12.0 - 16.0 g/dL    Hematocrit 36.6 (L) 37.0 - 47.0 %    MCV 98.4 (H) 81.4 - 97.8 fL    MCH 34.1 (H) 27.0 - 33.0 pg    MCHC 34.7 33.6 - 35.0 g/dL    RDW 61.5 (H) 35.9 - 50.0 fL    Platelet Count 134 (L) 164 - 446 K/uL    MPV 11.0 9.0 - 12.9 fL    Neutrophils-Polys 72.30 (H) 44.00 - 72.00 %    Lymphocytes 7.40 (L) 22.00 - 41.00 %    Monocytes 16.30 (H) 0.00 - 13.40 %    Eosinophils 1.80 0.00 - 6.90 %    Basophils 0.70 0.00 - 1.80 %    Immature Granulocytes 1.50 (H) 0.00 - 0.90 %    Nucleated RBC 0.00 /100 WBC    Neutrophils (Absolute) 6.59 2.00 - 7.15 K/uL    Lymphs (Absolute) 0.67 (L) 1.00 - 4.80 K/uL    Monos (Absolute) 1.48 (H) 0.00 - 0.85 K/uL    Eos (Absolute) 0.16 0.00 - 0.51 K/uL    Baso (Absolute) 0.06 0.00 - 0.12 K/uL    Immature Granulocytes (abs) 0.14 (H) 0.00 - 0.11 K/uL    NRBC (Absolute) 0.00 K/uL   COMP METABOLIC PANEL    Collection Time: 04/22/18  2:50 PM   Result Value Ref Range    Sodium 127 (L) 135 - 145 mmol/L    Potassium 5.1 3.6 - 5.5 mmol/L    Chloride 99 96 - 112 mmol/L    Co2 16 (L) 20 - 33 mmol/L    Anion Gap 12.0 (H) 0.0 - 11.9    Glucose 83 65 - 99 mg/dL    Bun 48 (H) 8 - 22 mg/dL    Creatinine 2.08 (H) 0.50 - 1.40 mg/dL    Calcium 9.3 8.5 - 10.5 mg/dL    AST(SGOT) 831 (H) 12 - 45 U/L    ALT(SGPT) 365 (H) 2 - 50 U/L    Alkaline Phosphatase 73 30 - 99 U/L    Total Bilirubin 19.2 (H) 0.1 - 1.5 mg/dL    Albumin 2.1 (L) 3.2 - 4.9 g/dL    Total Protein 5.3 (L) 6.0 - 8.2 g/dL    Globulin 3.2 1.9 - 3.5 g/dL    A-G Ratio 0.7 g/dL   TROPONIN    Collection Time: 04/22/18  2:50 PM   Result Value Ref Range    Troponin I 0.02 0.00 - 0.04 ng/mL   APTT    Collection Time: 04/22/18  2:50 PM   Result Value Ref Range    APTT 53.0 (H) 24.7 - 36.0 sec   PROTHROMBIN TIME (INR)    Collection Time: 04/22/18  2:50 PM   Result Value Ref Range    PT 53.7 (H) 12.0 - 14.6 sec    INR 6.03 (H) 0.87 - 1.13   LACTIC ACID    Collection Time: 04/22/18  2:50 PM   Result Value Ref Range    Lactic Acid 4.4 (HH) 0.5 - 2.0  mmol/L   ACETAMINOPHEN    Collection Time: 04/22/18  2:50 PM   Result Value Ref Range    Acetaminophen -Tylenol <10 10 - 30 ug/mL   HEPATITIS PANEL ACUTE(4 COMPONENTS)    Collection Time: 04/22/18  2:50 PM   Result Value Ref Range    Hepatitis B Surface Antigen Negative Negative    Hepatitis C Antibody Negative Negative    Hepatitis B Cors Ab,IgM Negative Negative    Hepatitis A Virus Ab, IgM Negative Negative   MAGNESIUM    Collection Time: 04/22/18  2:50 PM   Result Value Ref Range    Magnesium 1.5 1.5 - 2.5 mg/dL   ESTIMATED GFR    Collection Time: 04/22/18  2:50 PM   Result Value Ref Range    GFR If  28 (A) >60 mL/min/1.73 m 2    GFR If Non African American 23 (A) >60 mL/min/1.73 m 2   AMMONIA    Collection Time: 04/22/18  3:38 PM   Result Value Ref Range    Ammonia 51 (H) 11 - 45 umol/L   LACTIC ACID    Collection Time: 04/22/18  6:10 PM   Result Value Ref Range    Lactic Acid 2.3 (H) 0.5 - 2.0 mmol/L       Imaging/Procedures Review:    ndependant Imaging Review: Completed  US-GALLBLADDER   Final Result      1.  Lobular liver surface and heterogeneous liver echogenicity are consistent with cirrhosis.      2.  Post cholecystectomy.      3.  No biliary ductal dilatation appreciated.         DX-CHEST-PORTABLE (1 VIEW)   Final Result         1.  Unusual wedge-shaped opacification noted in right lower hemithorax adjacent to the right heart border. This could represent collapse of a portion of the right lung. Underlying carcinoma is a possibility. Fibrosis pneumonia or loculated fluid are also    possibilities.      2.  Minimal blunting of costophrenic angles bilaterally.      DX-LUMBAR SPINE-2 OR 3 VIEWS   Final Result      1.  Moderate anterior wedge compression fracture mainly affecting the superior endplate of L1 is identified with some retropulsion as described above. This could potentially represent a chronic or old traumatic fracture or insufficiency fracture. Acute    fracture is probably less  likely given smoothly marginated deformed superior endplate.      2.  Moderate degenerative disc disease and facet arthropathy.      CT-HEAD W/O   Final Result      No acute intracranial abnormality is identified.      Atrophy      There are mild periventricular and subcortical white matter changes present.  This finding is nonspecific and could be from previous small vessel ischemia, demyelination, or gliosis.      US-RENAL    (Results Pending)          EKG:   EKG Independant Review: Completed  QTc: 519, HR: 105, atrial fibrillation    (x) Records reviewed and summarized in current documentation             Assessment/Plan     Acute kidney injury (CMS-HCC)- (present on admission)   Assessment & Plan    - Cr 2.08 (baseline Cr 0.85)   - poor PO intake, high BUN/Cr ratio, likely from dehydration  plan  - IV hydration  - renal ultrasound  - hold home spironolactone, losartan  - avoid nephrotoxin  - renally dose meds  - urinalysis, urine lytes, urine urea, consider to calculate FeUrea  - monitor kidney function        Acute liver failure without hepatic coma- (present on admission)   Assessment & Plan    - jaundice, fatigue, poor PO intake. No fever, chills, abdominal pain, any GI bleeding. Patient declined rectal exam.  - Mental status within normal limits. No asterixis. No hepatic encephalopathy.  - , , alk phos 73, total bili 19.2, albumin 2.1, total protein 5.3, ammonia 51, INR 6.03, PTT 53.7, APTT 53  - History of 3 glasses of wine drinking for many years, perhaps ETOH related?  - intermittent tylenol, does not take daily, no hx of overdose  - MELD score 40, MELD sodium 40, mortality 71.3 %  - Child Valenzuela C  - Shimon 199-211, consider prednisolone, when infection is ruled out.  - Acute hepatitis panel negative.  - Abdominal ultrasound suggests cirrhosis   - elevated ammonia.   -  INR 6.03, PTT 53.7, APTT 53 suggests coagulopathy  Plan  - GI consult. Called Digestive [facility]. Dr. Miller called back.  Day team please contact digestive [facility] again in AM.  - Per GI, hydration and give IV vitamin K 10 mg for coagulopathy, monitor liver/kidney function, avoid liver toxins.  - hold home apixaban due to child han C and BLAYNE   - consider to request records from [facility] in AM.  - check TEG study, INR/PT/PTT  - renal/hepatic diet  - neurochecks          Abnormal CXR- (present on admission)   Assessment & Plan    - asymptomatic  - pCXR 1.  Unusual wedge-shaped opacification noted in right lower hemithorax adjacent to the right heart border. This could represent collapse of a portion of the right lung. Underlying carcinoma is a possibility. Fibrosis pneumonia or loculated fluid are also   possibilities.  2.  Minimal blunting of costophrenic angles bilaterally.  - received ceftriaxone x1 and doxycycline in ED.   - Does not look infectious clinically.   - monitor s/s of infection, check procalcitonin  - consider CT chest with contrast when her BLAYNE improves  - supportive, IS, O2, RT        Hypotension   Assessment & Plan    - improving with IV fluid  - encourage PO intake  - hold home BP meds        Lactic acidosis- (present on admission)   Assessment & Plan    - LA 4.4-> 2.3, improved with IV fluid  - improving with IV fluid        Atrial fibrillation (CMS-HCC)- (present on admission)   Assessment & Plan    - rate control  - renally dose atenolol with parameters.  - Cardiac monitoring  - renal/hepatic diet  - hold apixaban due to liver failure and BLAYNE        Compression fracture of L1 lumbar vertebra (CMS-HCC)- (present on admission)   Assessment & Plan    - Moderate anterior wedge compression fracture on X ray  - hx of fall many days ago, patient does not remember when  - supportive  - fall precaution        COPD (chronic obstructive pulmonary disease) (CMS-HCC)- (present on admission)   Assessment & Plan    - not on home oxygen  - O2, RT, continue home spiriva, symbicort          SCD for DVT  prophylaxis    Anticipated Hospital stay:  >2 midnights        Quality Measures  Quality-Core Measures   Reviewed items::  Medications reviewed, Labs reviewed, EKG reviewed and Radiology images reviewed  Sanchez catheter::  No Sanchez  DVT prophylaxis pharmacological::  Contraindicated - High bleeding risk (Supratherapeutic INR)  DVT prophylaxis - mechanical:  SCDs  Ulcer Prophylaxis::  Not indicated

## 2018-04-23 NOTE — ASSESSMENT & PLAN NOTE
Chronic afib, on atenolol 50 mg, currently holding given her hypotension & apixaban at home.     - holding apixaban due to liver failure, elevated INR and BLAYNE  - digoxin was given.  - comfort care since 4/29/18 per patient and family

## 2018-04-23 NOTE — CARE PLAN
Problem: Knowledge Deficit  Goal: Knowledge of disease process/condition, treatment plan, diagnostic tests, and medications will improve    Intervention: Assess knowledge level of disease process/condition, treatment plan, diagnostic tests, and medications  Patient and family understand risks of hypotension. Understand treatment plan at this time.       Problem: Fluid Volume:  Goal: Will maintain balanced intake and output    Intervention: Monitor, educate, and encourage compliance with therapeutic intake of liquids  Strict input and output at this time. Patient educated on adequate fluid intake and verbalized understanding.

## 2018-04-23 NOTE — ED NOTES
Per Md, pt can eat.  Pt provided sandwich box with cup of ice water at this time.  Pt and family updated on pt status and plan of care at this time.

## 2018-04-23 NOTE — CONSULTS
Date of service: 4/23/2018    Attending Physician: TRESA Swanson*    History of Present Illness: Rox Valdez is a 77 y.o. female here for jaundice and weakness.    77 year old femal admitted with jaundice and new onset cirrhosis. She is a mild to moderate drinker at times.    She does not have ascites and is not encephalopathic.    Her renal failure appears acute and has improved with IVF overnite.    She has significant coagulopathy and we will see if she has respondede to IV Vit K.    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    She complains of weakness and fatigue.    She has a h/o COPD (quit toabcco in 1980s)  Review of Systems:   Review of Systems   Constitutional: Positive for malaise/fatigue. Negative for chills, fever and weight loss.   HENT: Negative.    Eyes: Negative.    Respiratory: Positive for shortness of breath.    Cardiovascular: Positive for orthopnea.   Gastrointestinal: Negative.    Musculoskeletal: Negative.    Skin: Positive for itching.   Neurological: Positive for dizziness and weakness.   Endo/Heme/Allergies: Bruises/bleeds easily.   Psychiatric/Behavioral: Negative.        No current facility-administered medications on file prior to encounter.      Current Outpatient Prescriptions on File Prior to Encounter   Medication Sig Dispense Refill   • tiotropium (SPIRIVA HANDIHALER) 18 MCG Cap Use 1 capsule inhaled from handihaler every day. 90 Cap 3   • ELIQUIS 5 MG Tab TAKE 1 TAB BY MOUTH 2 TIMES A DAY. 180 Tab 3   • spironolactone (ALDACTONE) 25 MG Tab Take 1 Tab by mouth every day. 30 Tab 11   • budesonide-formoterol (SYMBICORT) 160-4.5 MCG/ACT Aerosol Inhale 2 Puffs by mouth 2 Times a Day. 1 Inhaler 11   • losartan (COZAAR) 25 MG Tab Take 25 mg by mouth every morning.     • atenolol (TENORMIN) 50 MG Tab Take 50 mg by mouth every morning.       Current Facility-Administered Medications   Medication Dose Route Frequency Provider Last Rate  Last Dose   • traZODone (DESYREL) tablet 100 mg  100 mg Oral QHS Jasper Milton M.D.   100 mg at 04/23/18 0155   • Rally Bag infusion   Intravenous Once Marie Panchal M.D.        And   • [START ON 4/24/2018] thiamine (THIAMINE) tablet 100 mg  100 mg Oral DAILY Marie Panchal M.D.        And   • [START ON 4/24/2018] folic acid (FOLVITE) tablet 1 mg  1 mg Oral DAILY Marie Panchal M.D.        And   • [START ON 4/24/2018] multivitamin (THERAGRAN) tablet 1 Tab  1 Tab Oral DAILY Marie Panchal M.D.       • NS infusion   Intravenous Continuous Marie Panchal M.D. 125 mL/hr at 04/22/18 2135     • Respiratory Care per Protocol   Nebulization Continuous RT Marie Panchal M.D.       • atenolol (TENORMIN) tablet 25 mg  25 mg Oral Q DAY Marie Panchal M.D.       • budesonide-formoterol (SYMBICORT) 160-4.5 MCG/ACT inhaler 2 Puff  2 Puff Inhalation BID Marie Panchal M.D.       • tiotropium (SPIRIVA) 18 MCG inhalation capsule 1 Cap  1 Cap Inhalation DAILY Marie Panchal M.D.           Social History   Substance Use Topics   • Smoking status: Former Smoker     Packs/day: 1.00     Years: 28.00     Types: Cigarettes     Start date: 1/1/1953     Quit date: 1/1/1981   • Smokeless tobacco: Never Used   • Alcohol use 3.0 - 6.0 oz/week     5 - 10 Standard drinks or equivalent per week      Comment: 2-3 glasses of wine daily. 21 drinks of wine per week        Past Medical History:   Diagnosis Date   • Atrial fibrillation (CMS-HCC)    • COPD (chronic obstructive pulmonary disease) (CMS-HCC)    • Cor pulmonale (CMS-HCC)    • HTN (hypertension)    • Long term (current) use of anticoagulants    • PVC (premature ventricular contraction)    • Unspecified menopausal and postmenopausal disorder        Past Surgical History:   Procedure Laterality Date   • CATARACT EXTRACTION WITH IOL     • CHOLECYSTECTOMY      LAPAROSCOPIC.   • KNEE ARTHROSCOPY     • RETINAL  "DETACHMENT REPAIR         Allergies: Patient has no known allergies.    Family History   Problem Relation Age of Onset   • Hypertension Mother    • Heart Disease Brother      Atrial Fibrillation.       Vitals:    04/22/18 1357 04/22/18 1410 04/22/18 1500 04/22/18 1530   Height: 1.575 m (5' 2\")      Weight: 61 kg (134 lb 7.7 oz)      Weight % change since last entry.: 0 %      BP: (!) 83/56      Pulse: (!) 53 (!) 42 (!) 105 (!) 42   BMI (Calculated): 24.6      Resp: 17 16     Temp: 35.9 °C (96.6 °F)       04/22/18 1630 04/22/18 1745 04/22/18 1800 04/22/18 1815   Height:       Weight:       Weight % change since last entry.:       BP:       Pulse: (!) 102 (!) 103 99 98   BMI (Calculated):       Resp: 16 17 17 17   Temp:        04/22/18 1845 04/22/18 2103 04/22/18 2125 04/22/18 2145   Height:   1.575 m (5' 2\")    Weight:   63.6 kg (140 lb 3.4 oz)    Weight % change since last entry.:   4.26 %    BP:       Pulse: (!) 103 (!) 110  (!) 101   BMI (Calculated):   25.65    Resp: 17   (!) 21   Temp:        04/22/18 2200 04/22/18 2215 04/22/18 2300 04/23/18 0000   Height:       Weight:       Weight % change since last entry.:       BP:       Pulse: 95 86 88 100   BMI (Calculated):       Resp: 19 16 17 17   Temp:    36.2 °C (97.2 °F)    04/23/18 0100 04/23/18 0145 04/23/18 0200 04/23/18 0300   Height:       Weight:       Weight % change since last entry.:       BP:       Pulse: 84 92 96 91   BMI (Calculated):       Resp: 16 15 20 15   Temp:        04/23/18 0315 04/23/18 0400 04/23/18 0500 04/23/18 0515   Height:       Weight:       Weight % change since last entry.:       BP:       Pulse: (!) 111 98 86 93   BMI (Calculated):       Resp: 16 17 14 (!) 23   Temp:  36 °C (96.8 °F)      04/23/18 0520 04/23/18 0600   Height:     Weight:     Weight % change since last entry.:     BP:     Pulse: 80 100   BMI (Calculated):     Resp: 17 16   Temp:         Physical Examination:    Physical Exam   Constitutional: She is oriented to person, " place, and time and well-developed, well-nourished, and in no distress. No distress.   Eyes: Scleral icterus is present.   Neck: No JVD present. No tracheal deviation present.   Cardiovascular: Normal rate and regular rhythm.    Pulmonary/Chest: Effort normal. No stridor. No respiratory distress.   Abdominal: Soft. She exhibits no distension. There is no tenderness. There is no rebound and no guarding.   Musculoskeletal: Normal range of motion.   Neurological: She is alert and oriented to person, place, and time.   Skin: Skin is warm. She is not diaphoretic.         Lab Results   Component Value Date/Time    PROTHROMBTM 53.7 (H) 04/22/2018 02:50 PM    INR 6.03 (H) 04/22/2018 02:50 PM      Lab Results   Component Value Date/Time    WBC 7.5 04/23/2018 04:36 AM    RBC 3.38 (L) 04/23/2018 04:36 AM    HEMOGLOBIN 11.6 (L) 04/23/2018 04:36 AM    HEMATOCRIT 33.7 (L) 04/23/2018 04:36 AM    MCV 99.7 (H) 04/23/2018 04:36 AM    MCH 34.3 (H) 04/23/2018 04:36 AM    MCHC 34.4 04/23/2018 04:36 AM    MPV 11.4 04/23/2018 04:36 AM    NEUTSPOLYS 71.20 04/23/2018 04:36 AM    LYMPHOCYTES 10.60 (L) 04/23/2018 04:36 AM    MONOCYTES 13.50 (H) 04/23/2018 04:36 AM    EOSINOPHILS 2.90 04/23/2018 04:36 AM    BASOPHILS 0.70 04/23/2018 04:36 AM    ANISOCYTOSIS 1+ 01/23/2018 12:43 PM      Lab Results   Component Value Date/Time    SODIUM 130 (L) 04/23/2018 04:36 AM    POTASSIUM 5.5 04/23/2018 04:36 AM    CHLORIDE 105 04/23/2018 04:36 AM    CO2 21 04/23/2018 04:36 AM    GLUCOSE 99 04/23/2018 04:36 AM    BUN 42 (H) 04/23/2018 04:36 AM    CREATININE 1.89 (H) 04/23/2018 04:36 AM      Recent Labs      04/22/18   1450  04/22/18   1538  04/23/18   0436   ASTSGOT  831*   --   661*   ALTSGPT  365*   --   324*   TBILIRUBIN  19.2*   --   16.9*   ALKPHOSPHAT  73   --   63   GLOBULIN  3.2   --   2.7   INR  6.03*   --    --    AMMONIA   --   51*  51*       Imaging:   US-GALLBLADDER   Final Result      1.  Lobular liver surface and heterogeneous liver  echogenicity are consistent with cirrhosis.      2.  Post cholecystectomy.      3.  No biliary ductal dilatation appreciated.         DX-CHEST-PORTABLE (1 VIEW)   Final Result         1.  Unusual wedge-shaped opacification noted in right lower hemithorax adjacent to the right heart border. This could represent collapse of a portion of the right lung. Underlying carcinoma is a possibility. Fibrosis pneumonia or loculated fluid are also    possibilities.      2.  Minimal blunting of costophrenic angles bilaterally.      DX-LUMBAR SPINE-2 OR 3 VIEWS   Final Result      1.  Moderate anterior wedge compression fracture mainly affecting the superior endplate of L1 is identified with some retropulsion as described above. This could potentially represent a chronic or old traumatic fracture or insufficiency fracture. Acute    fracture is probably less likely given smoothly marginated deformed superior endplate.      2.  Moderate degenerative disc disease and facet arthropathy.      CT-HEAD W/O   Final Result      No acute intracranial abnormality is identified.      Atrophy      There are mild periventricular and subcortical white matter changes present.  This finding is nonspecific and could be from previous small vessel ischemia, demyelination, or gliosis.      US-RENAL    (Results Pending)           Assessment and Plan:    1. Cirrhosis of unclear etiology  2. If drinking history of 1 - 2 drinks is indeed reflective of her habits it is unlikely to be cause of ESLD  3. Hepatitis possibly acute ETOH vs Tylenol (can be toxic at lower dose with chronic significant drinking)  4. ARF already responding to hydration IVF  5. No clinical or US evidence of ascites, no encephalopathy, creatinine improving  INR 6 but will see how she responded to Vit K overnight      Rec:  1. Check INR daily  2. Transaminases improving, hold all Tylenol products  3. Bili improving (if sanchez ETOH hepatitis this may actually go up so watch closely)  4.  Check SUMAN, AMA, ASMA, A1AT, ceruloplasmin, Hep B Core total antibody, Hep C viral RNA QUALITATIVE,  Hep B surface antibody, Hep A total antibody (if the two latter are negative she will require vaccination in outpatient setting), TSH    Often with cirrhosis and acute hepatitis many of these test may become positive (and false positive).    5. EGD in non acute setting to evaluate portal HTN  6. If creatinine does not improved request renal evaluation.

## 2018-04-23 NOTE — PROCEDURES
Central Line Insertion    Resident:    Liborio Oliver   Attending:    Salazar Ha  Indication for procedure:  Critical illness, CVP monitoring, and pressors      A time-out was completed verifying correct patient, procedure, site, positioning, and special equipment if applicable. The patient was placed in a dependent position appropriate for central line placement based on the vein to be cannulated. The patient’s Right neck was prepped and draped in sterile fashion. 1% Lidocaine was used to anesthetize the surrounding skin area. A triple lumen 9-Norwegian Cordis catheter was introduced into the the internal jugular vein using the Seldinger technique and under ultrasound guidance. The catheter was threaded smoothly over the guide wire and appropriate blood return was obtained. Each lumen of the catheter was evacuated of air and flushed with sterile saline. The catheter was then sutured in place to the skin and a sterile dressing applied. Perfusion to the extremity distal to the point of catheter insertion was checked and found to be adequate. The Attending for the entire procedure at bedside.    Estimated Blood Loss: 2 mL  A chest X-ray was done after the procedure, it showed no pneumothorax, and good position of the cathter tip.  The patient tolerated the procedure well and there were no complications.

## 2018-04-23 NOTE — ASSESSMENT & PLAN NOTE
Moderate anterior wedge compression fracture on X ray  Hx of fall many days ago, patient does not remember when  - Fall precaution     - can use lidocaine patch for pain if needed [avoid NSAIDs]

## 2018-04-23 NOTE — PROGRESS NOTES
Pulmonary Critical Care Progress Note        Admit:  4/22/2018    Chief Complaint:  Weakness/fatigue - low Bp/hepatic and renal failure    History of Present Illness:    This is a 77-year-old lady   with an extensive past medical history.  She comes in to the emergency room   complaining of weakness and fatigue.  She has been having problems with   generalized pruritus for quite some time, but it has become worse for the last   several days.  She has been getting dizzy when she stands for the last 3 days   and about 3 days ago her  noted that she was becoming jaundiced.  She   came in to the emergency department.  In the field, her blood pressure was in   60s systolic.  She is receiving intravenous crystalloid solution.     She admits to drinking 3 glasses of white wine a day, which she has done for   many years.  She occasionally will take acetaminophen for pain, but does not   abuse it.  She has no known history of liver disease.    Review of Systems   Constitutional: Positive for malaise/fatigue. Negative for chills, diaphoresis and fever.   HENT: Negative for congestion, nosebleeds, sinus pain and sore throat.    Eyes: Negative.    Respiratory: Positive for cough and shortness of breath. Negative for hemoptysis and sputum production.    Cardiovascular: Negative for chest pain and palpitations.   Gastrointestinal: Positive for nausea. Negative for abdominal pain, blood in stool, diarrhea and vomiting.   Genitourinary: Negative.    Skin: Negative for rash.   Neurological: Positive for weakness. Negative for speech change, focal weakness and headaches.   Endo/Heme/Allergies: Bruises/bleeds easily.   Psychiatric/Behavioral: Negative for depression. The patient has insomnia.        Interval Events:  24 hour interval history reviewed     A&O x4  Bp running low  ARB 1.7 liters positive  AF - rate 80s  Still looks dry  Afebrile  Taking PO poorly  UO adequate    Bolus ongoing  Not dizzy  2 lpm NC -> Room  air  CXR slt CM, hyperinflated, min atx/opacities - no change  Eliquis held  INR 6.03  K 5.5  BUN/creat better  LA 1.6  LFTs better  Bili 16.9  NH3 51  Mg 1.5  Plt slt better    Persisting low Bp despite repeated fluid boluses  Becoming symptomatic  Central line placed  CVP low  Fluids continued - NE drip started at 10  W/u re etiology of low Bp ongoing      PFSH:  No change.    General:  Appears acute and chronically ill, appears her stated age    Skin:  Warm and dry, no rash or lesion, some tinting of skin, no edema    Respiratory:     Pulse Oximetry: 98 %  2 lpm NC O2          Exam: unlabored respirations, no intercostal retractions or accessory muscle use    Diminished BS at bases - few coarse rhonchi - R>L    ImagingCXR  I have personally reviewed the chest x-ray my impression is  noted above and films are reviewed with Team on rounds           Invalid input(s): [id number]    HemoDynamics:  Pulse: 98, Heart Rate (Monitored): 95  Blood Pressure : (!) 83/56, NIBP: (!) 81/56       Exam: no ectopy, atrial fibrillation, irregularly irregular rhythm, no edema, no murmur    Imaging: Available data reviewed  Recent Labs      04/22/18   1450   TROPONINI  0.02       Neuro:  GCS Total Steedman Coma Score: 15       Exam: no focal deficits noted mental status intact, weak diffusely  Imaging: Available data reviewed    Fluids:  Intake/Output       04/21/18 0700 - 04/22/18 0659 (Not Admitted) 04/22/18 0700 - 04/23/18 0659 04/23/18 0700 - 04/24/18 0659      0700-1859 [id number] Total 0700-1859 1900-0659 Total 0700-1859 1900-0659 Total       Intake    Total Intake -- -- -- -- -- -- -- -- --       Output    Urine  --  -- --  --  150 150  --  -- --    Void (ml) -- -- -- -- 150 150 -- -- --    Total Output -- -- -- -- 150 150 -- -- --       Net I/O     -- -- -- -- -150 -150 -- -- --        Weight: 63.6 kg (140 lb 3.4 oz)  Recent Labs      04/22/18   1450  04/23/18   0436   SODIUM  127*  130*   POTASSIUM  5.1  5.5   CHLORIDE  99   105   CO2  16*  21   BUN  48*  42*   CREATININE  2.08*  1.89*   MAGNESIUM  1.5   --    CALCIUM  9.3  8.0*       GI/Nutrition:  Exam: obese, abdomen is soft and non-tender, normal active bowel sounds, no CVAT  Imaging: Available data reviewed  taking PO poorly    Liver Function  Recent Labs      18   1450  18   0436   ALTSGPT  365*  324*   ASTSGOT  831*  661*   ALKPHOSPHAT  73  63   TBILIRUBIN  19.2*  16.9*   GLUCOSE  83  99       Heme:  Recent Labs      18   1450  18   2331  18   0436   RBC  3.72*   --   3.38*   HEMOGLOBIN  12.7   --   11.6*   HEMATOCRIT  36.6*   --   33.7*   PLATELETCT  134*   --   146*   PROTHROMBTM  53.7*   --    --    APTT  53.0*   --    --    INR  6.03*   --    --    FERRITIN   --   1087.1*   --        Infectious Disease:  Temp  Av.1 °C (96.9 °F)  Min: 35.9 °C (96.6 °F)  Max: 36.2 °C (97.2 °F)  Micro: reviewed     Did receive C3/Doxy in ER     Recent Labs      18   1450  18   0436   WBC  9.1  7.5   NEUTSPOLYS  72.30*  71.20   LYMPHOCYTES  7.40*  10.60*   MONOCYTES  16.30*  13.50*   EOSINOPHILS  1.80  2.90   BASOPHILS  0.70  0.70   ASTSGOT  831*  661*   ALTSGPT  365*  324*   ALKPHOSPHAT  73  63   TBILIRUBIN  19.2*  16.9*     Current Facility-Administered Medications   Medication Dose Frequency Provider Last Rate Last Dose   • traZODone (DESYREL) tablet 100 mg  100 mg QHS Jasper Milton M.D.   100 mg at 18 0155   • NS infusion   Continuous Marie Panchal M.D. 125 mL/hr at 18     • Respiratory Care per Protocol   Continuous RT Marie Panchal M.D.       • atenolol (TENORMIN) tablet 25 mg  25 mg Q DAY PROMISE SaldanaD.       • budesonide-formoterol (SYMBICORT) 160-4.5 MCG/ACT inhaler 2 Puff  2 Puff BID Marie Panchal M.D.       • tiotropium (SPIRIVA) 18 MCG inhalation capsule 1 Cap  1 Cap DAILY Marie Panchal M.D.         Last reviewed on 2018  9:51 PM by Rachna Smith,  R.N.    Quality  Measures:  Labs reviewed, Radiology images reviewed and Medications reviewed                      Problems/plan:  Acute unspecified hypotension.  This is likely due to intravascular volume depletion.   Low Bp despite repeated Bolus   DDX - still dry, metabolic, Sepsis, cardiac, current Bp meds - poor clearance (BLAYNE/AHF) etc   Central line   Midodrine -> NE drip   CVP monitoring   F/u Hct/Procal/cultures/Cortisol -> empiric ABX C3/Doxy   HC IV?   ECHO?   Lactic acidosis? Hepatic etiology - up to 4 normal?    Acute liver failure, etiology not entirely clear.  She does have a history of regular alcohol use and drinks 3 glasses of wine a day.  Of note, her iron studies on 03/09/2018 show elevated iron.   LABs slight better   GI evaluating - notes reviewed   H/o Reg ETOH - etiology? Monitor for DTs   Avoid hepatotoxins   Multiple serologies pending   Negative hepatitis panel.   Coagulopathy noted - follow - Blood products PRN - TEG/mapping PRN  Acute kidney injury.- function better   Avoid renal toxins   Renally dose meds?   Serial I/Os-BUN/creat   Sanchez if pressors started   May need renal eval  Intravascular volume depletion.   IVF  Abnormal chest x-ray with increased opacification at the right heart border.   F/u CXR - CAP?   Empiric IV ABX   Sputum requested  Hyponatremia.   Monitor  Anemia   Monitor   Transfuse PRN  Moderate stage II COPD with an FEV1 of 1.11 liters, 63% of predicted.  There is no acute exacerbation.   RT protocols   Titrate O2   IS/mobil;ize when safe   Nebs as needed  History of systemic arterial hypertension.   Holding meds   Poor clearance with both hepatic and renal issues -> low Bp?  Atrial fibrillation.  She is anticoagulated with apixaban historically   Hold for now with elevated albs and renal/hepatic failure  H/o Cor pulmonale  Prophylaxis    Prognosis very guarded.  Critically ill with unstable hemodynamic status on IV pressors with MOSF.  High risk of deterioration and  worsening vital organ dysfunction and death without the above critical care interventions.    Discussed patient condition and risk of morbidity and/or mortality with RN, RT, Pharmacy, [employer] resident, Charge nurse / hot rounds and Patient.    The patient remains critically ill.  Critical care time = 45 minutes in directly providing and coordinating critical care and extensive data review.  No time overlap and excludes procedures.

## 2018-04-23 NOTE — CONSULTS
DATE OF SERVICE:  04/22/2018    REQUESTING PHYSICIAN:  Dr. Amita Baum    CONSULTING PHYSICIAN:  Jasper Milton MD    TYPE OF CONSULTATION:  Pulmonary medicine and critical care medicine.    REASON FOR CONSULTATION:  Critical care management in lady with acute kidney   injury, liver failure and hypotension.    CHIEF COMPLAINT:  Weakness.    HISTORY OF PRESENT ILLNESS:  I was kindly asked by Dr. Baum to see and   evaluate this lady regarding the above problem.  This is a 77-year-old lady   with an extensive past medical history.  She comes in to the emergency room   complaining of weakness and fatigue.  She has been having problems with   generalized pruritus for quite some time, but it has become worse for the last   several days.  She has been getting dizzy when she stands for the last 3 days   and about 3 days ago her  noted that she was becoming jaundiced.  She   came in to the emergency department.  In the field, her blood pressure was in   60s systolic.  She is receiving intravenous crystalloid solution.    She admits to drinking 3 glasses of white wine a day, which she has done for   many years.  She occasionally will take acetaminophen for pain, but does not   abuse it.  She has no known history of liver disease.    CURRENT MEDICATIONS:  Atenolol 50 mg a day, budesonide/formoterol 160/4.5 two   puffs twice a day, apixaban 5 mg twice a day, losartan 25 mg a day,   spironolactone 25 mg a day, Tiotropium 18 mcg 1 puff a day.    ALLERGIES:  No known drug allergies.    PAST SURGICAL HISTORY:  She has had retina surgery, knee surgery,   cholecystectomy, cataract surgery.    ILLNESSES:  Systemic arterial hypertension, moderate stage II chronic   obstructive pulmonary disease with an FEV1 of 1.11 liters, 63% of predicted,   cor pulmonale, atrial fibrillation.    SOCIAL HISTORY:  She quit smoking in 1981.  She drinks 3 glasses of wine a   day.  She is .    FAMILY HISTORY:   Noncontributory.    REVIEW OF SYSTEMS:  The AMA and CMS system review does not reveal additional   significant positive findings.    PHYSICAL EXAMINATION:  VITAL SIGNS:  Temperature 96.6, blood pressure 83/56, heart rate 103,   respiratory rate is 19.  GENERAL:  She is an icteric lady.  HEENT:  Normocephalic, atraumatic.  Sinuses are nontender.  Nares patent.    Oropharynx with dry mucous membranes.  She has prominent scleral icterus.  NECK:  Trachea midline, supple.  CHEST:  Symmetrical.  HEART:  Irregularly irregular rhythm.  LUNGS:  Clear to auscultation and percussion.  ABDOMEN:  Soft, nondistended, nontender.  No masses.  EXTREMITIES:  No clubbing or cyanosis.  She does have some bruising and   ecchymoses on her arms.  NEUROLOGIC:  Awake, alert, oriented to person, place and time.  Cranial nerves   II-XII grossly intact.    DIAGNOSTIC DATA:  Her white blood cell count is 9100, hemoglobin 12.7,   hematocrit 36.6, platelet count 134,000.  Her sodium is 127, potassium 5.1,   chloride 99, CO2 of 16, BUN 48, creatinine 2.08, glucose 83, , ,   alkaline phosphatase 73, total bilirubin 19.2.  Lactic acid 4.4.  Ammonia is   51.  On 03/09/2018, her iron was markedly elevated at 349 with a total iron   binding of 398 and percent saturation of 88%.  Her acetaminophen level was   less than 10.  Her INR is 6.03.  Her hepatitis panel is negative.  CT scan of   the head shows no acute pathology.  Chest x-ray reveals an area of increased   opacification along the right heart border.  No prior films are available for   comparison.  I personally reviewed the x-ray and abdominal ultrasound is   performed.  Her liver surface is lobular and heterogenous consistent with   cirrhosis.  There is no biliary duct dilatation.    IMPRESSION:  1.  Acute unspecified hypotension.  This is likely due to intravascular volume   depletion.  2.  Acute liver failure, etiology not entirely clear.  She does have a history   of regular  alcohol use and drinks 3 glasses of wine a day.  Of note, her iron   studies on 03/09/2018 show elevated iron.  3.  Acute kidney injury.  4.  Intravascular volume depletion.  5.  Abnormal chest x-ray with increased opacification at the right heart   border.  6.  Lactic acidosis.  7.  Hyponatremia.  8.  Coagulopathy.  9.  Negative hepatitis panel.  10.  Moderate stage II chronic obstructive pulmonary disease with an FEV1 of   1.11 liters, 63% of predicted.  There is no acute exacerbation.  11.  History of systemic arterial hypertension.  12.  Atrial fibrillation.  She is anticoagulated with apixaban.  13.  Cor pulmonale.  14.  Regular alcohol use.    PLAN/MEDICAL DECISION MAKING:  This lady is critically ill.  She is going to   be admitted to the intensive care unit.  She is going to be hydrated with   intravenous crystalloid solution.  I am going to obtain gastroenterology   consultation.  I am going to follow her aminotransferases, bilirubin and   electrolytes and renal function very closely.  She requires a CT scan of the   chest with contrast, but I am going to hold off on ordering this until her   renal function has normalized.  I am going to obtain a thromboelastogram with   platelet mapping.  For now, I am going to hold her apixaban as well as her   losartan and her Aldactone.  I am going to trend her lactic acid.  I suspect   her lactic acidosis is due to intravascular volume depletion.  This lady does   not have any acute infectious symptoms and therefore, I do not believe she is   septic.  Therefore, I am not going to place her on antibiotics.  At the   current time, this lady's prognosis is guarded.  She is critically ill.  I   have spent 35 minutes providing direct critical care services at the bedside.    There has been no time overlap.  The time spent excludes the time spent   performing procedures (08382).    I have assessed and reassessed her blood pressure and hemodynamics and   cardiovascular  status.  She is at increased risk for worsening hepatic, renal   and cardiovascular system dysfunction.    The case has been reviewed with the medical residents, nursing, and   respiratory therapy.    Thank you Dr. Baum for allowing us to participate in the care of this lady.    We will continue to follow her with great interest.       ____________________________________     MD TAM BENÍTEZ / LISA    DD:  04/22/2018 20:38:37  DT:  04/22/2018 22:03:26    D#:  [id number]  Job#:  [id number]    cc: Amita Baum MD

## 2018-04-23 NOTE — ASSESSMENT & PLAN NOTE
- ? CAP,  unusual wedge-shaped opacification noted in right lower hemithorax adjacent to the right heart border. This could represent collapse of a portion of the right lung vs PNA. Underlying carcinoma is a possibility. Fibrosis pneumonia or loculated fluid are also possibilities. Minimal blunting of costophrenic angles bilaterally.    - received ceftriaxone x1 and doxycycline in ED   - Does not look infectious clinically however given low BP & elevated procalcitonin, there is concern for CAP and completed ceftriaxone and doxycycline 5 days total on 4/27/2018    - supportive   - comfort care per patient and family

## 2018-04-23 NOTE — ASSESSMENT & PLAN NOTE
Poor PO intake, high BUN/Cr ratio, likely from dehydration.   Cr 2.08 (baseline Cr 0.85).    BUN/Cr consistent with pre-renal, Improving with re-hydration      Jese 2.3 %, however patient was on diuretics, UA hyaline casts, no active sediment    Renal ultrasound unremarkable.       Improving with re-hydration. Now off pressor  - holding home spironolactone, losartan.    - renally dose meds.    - comfort care since 4/29/18 per patient and family

## 2018-04-23 NOTE — ASSESSMENT & PLAN NOTE
S/p IV fluid and pressor ( levo & vasopressin ).   Relative adrenal insufficiency, dehydration, liver failure.   -home BP meds [atenolol 50 mg, losartan 25 mg, spironolactone 25 mg] held  -was on prednisone for alcoholic hepatitis, was on Hydrocortisone, midodrine  -was on digoxin 250 micros PO given A fib.  -Comfort care since 4/29/18

## 2018-04-23 NOTE — ASSESSMENT & PLAN NOTE
Drinks 3 glasses of wine a day for many years.  Jaundice, fatigue, poor PO intake.    No fever, chills, abdominal pain, or signs/symptoms of GI bleeding.   Marked juandice and bruising. Mental status within normal limits.   No asterixis. No hepatic encephalopathy.      Initial labs , , alk phos 73, total bili 19.2, albumin 2.1, total protein 5.3, ammonia 51  INR 6.03, PTT 53.7, APTT 53. Ferritin very high, transferrin low. Acute hepatitis panel negative. HIV negative. TSH WNL.   Hep B Core total antibody, Hep C viral RNA QUALITATIVE,  Hep B surface antibody Neg.   TEG study unremarkable.  anti-giancarlo 9.0 (WNL)      SUMAN titier [1:80] and F-actin antibody [40], and smooth muscle AB are Positive consistent with autoimmune hepatitis [GI think this is false positive]. However liver biopsies do not show significantly increased numbers of plasma cells which can be associated with autoimmune hepatitis. [Path comment: Liver Bx: rule out alcoholic hepatitis withassociated cirrhosis. ]     Abdominal ultrasound suggests cirrhosis.    MELD score 40, MELD sodium 40, mortality 71.3 %  Child Valenzuela C  GI thinks this is more with Severe alcoholic hepatitis rather than autoimmune hepatitis      - AST, ALT improving, T. anni still high  - continue to hold home apixaban due to child valenzuela C and BLAYNE   - Renal/hepatic diet.   - GI thinks this is more with Severe alcoholic hepatitis (Maddrey Discriminant Function [MDF] score ?32).   -  prednisolone PO 40 mg daily started on 4/28/18-4/29/18 for ETOH hepatitis  - Omeprazole for stress ulcer prophylaxis. Has coagulopathy, low platelet count, INR) >1.5, and she is on steroids.    - Comfort care, patient and family does not want to continue steroid, but ok for lactulose to help with her mentation.

## 2018-04-23 NOTE — ASSESSMENT & PLAN NOTE
Not on home oxygen  PFT Aug 2017 Ratio 61% FEV1 63%, DLCO WNL   - O2, RT, continue home spiriva, symbicort

## 2018-04-23 NOTE — PROGRESS NOTES
Dr. Ha and Dr. Oliver at bedside this morning. Aware of PT SBP 50s-80s. Manual systolic blood pressure readings have been 75-85. Continued fluid resuscitation. Patient has no complaints of dizziness or other symptoms of hypotension at this time.

## 2018-04-23 NOTE — DISCHARGE PLANNING
Medical SW    Sw attended AM IDT Rounds.    Per face sheet, pt is resident Negrito, , 76yo female, admttied 4/22 for acute liver failure, and carries SCP INS.    RN reports, pt A/O x4, chronic afib, no schmidt, up to bathroom, on room air,       Plan: Sw to assist w/ d/c planning as needed.

## 2018-04-23 NOTE — PROGRESS NOTES
[facility] ICU Progress Note      Admit Date: 4/22/2018  ICU Day: 2    Resident(s): Liborio Oliver   Attending: HAZEL AMOR/ Dr. Salazar Ha M.D.     Date & Time:   4/23/2018   2:25 PM       Patient ID:    Name:             Rox Valdez   YOB: 1940  Age:                 77 y.o.  female   MRN:               [id number]    HPI:  77 year old female with PMH ox of Atrial fibrillationon apixaban, moderate alcohol intake, COPD not on home oxygen admitted 4/22/2018 after two days history of fatigue, dizziness, and jaundice, two months of generalized pruritus. Found to be hypotensive, and having BLAYNE, elevated liver enzymes, and bilirubin [, , alk phos 73, total bili 19.2, albumin 2.1, total protein 5.3, ammonia 51, INR 6.03, PTT 53.7, APTT 53]. Creatinine 2.08, GFR 23,  LA 4.4-> 2.3, improved with IV fluid.GI consulted, recommended basic supportive care and to IV Vit K, pending immunological and infection panels.        Consultants:  PMA:  Dr Milton.     GI: Dr. Jose Raul Dexter.     Procedures:  N/A    Interval Events:    4/23/2018   Patient hypotensive, still looks clinically dry, 500 mL boluses X3 for MAP < 65, stop home atenolol 2, start midodrine 5 mg TID. Evaluated by GI, and SUMAN, AMA, ASMA, A1AT, ceruloplasmin, Hep B Core total antibody, Hep C viral RNA QUALITATIVE,  Hep B surface antibody, & TSH were ordered based on their recommendations.    3:43 PM update   Patient remained hypotensive despite ~ 3.5L of fluids in addition to midodrine, central line was placed, CVP monitoring,   stat CBC, lactate, random cortisol and blood cultures were ordered. Started empiric antibiotics. Troponin in addition to an Echo were ordered.     Review of Systems   Constitutional: Negative for chills and fever.   HENT: Negative for sore throat.    Eyes: Negative for photophobia and pain.   Respiratory: Negative for cough, hemoptysis, shortness of breath and stridor.    Cardiovascular:  Negative for chest pain and palpitations.   Gastrointestinal: Negative for abdominal pain, diarrhea and vomiting.   Genitourinary: Negative for urgency.   Musculoskeletal: Negative for falls.   Skin:        Diffuse bruising  upper and lower extremities    Neurological: Negative for seizures and loss of consciousness.   Endo/Heme/Allergies: Bruises/bleeds easily.   Psychiatric/Behavioral: Negative for hallucinations.     Physical Exam   Constitutional: She is oriented to person, place, and time. She appears well-developed.   markedly jaundiced    HENT:   Head: Atraumatic.   markedly jaundiced    Eyes: Pupils are equal, round, and reactive to light. Right eye exhibits no discharge. Left eye exhibits no discharge.   Neck: Normal range of motion. No JVD present.   Cardiovascular: Normal heart sounds.  Exam reveals no gallop and no friction rub.    No murmur heard.  Mostly regular part from intermittent ectopics    Pulmonary/Chest: Effort normal. No stridor. No respiratory distress. She has no wheezes.   Faint bibasal crackles, more on the right    Abdominal: Soft. Bowel sounds are normal. She exhibits no distension. There is no tenderness. There is no rebound.   Musculoskeletal: She exhibits no tenderness or deformity.   Neurological: She is alert and oriented to person, place, and time. No cranial nerve deficit.   Skin: Skin is warm and dry. Rash: Diffuse bruising and marked jaundice    Psychiatric: She has a normal mood and affect. Her behavior is normal.       Respiratory:     Respiration: 20, Pulse Oximetry: 97 %, O2 Daily Delivery Respiratory : Silicone Nasal Cannula    Chest Tube Drains:          Invalid input(s): [id number]    HemoDynamics:  Pulse: 81, Heart Rate (Monitored): 85 Blood Pressure : (!) 75/45, NIBP: (!) 72/55      Neuro:      Fluids:    Date 04/23/18 0700 - 04/24/18 0659   Shift 1797-9208 5380-1686 5001-8624 24 Hour Total   I  N  T  A  K  E   P.O. 160   160      P.O. 160   160    I.V. 6333 2743       IV Piggyback Volume (NS Bolus ) 500   500      LR 1000   1000      IV Volume (Normal Saline) 750   750      IV Volume (Rally Bag ) 168   168    Shift Total 2578   2578   O  U  T  P  U  T   Urine 0   0      Void (ml) 0   0    Shift Total 0   0   NET 2578   2578        Intake/Output Summary (Last 24 hours) at 18 0744  Last data filed at 18 0600   Gross per 24 hour   Intake             2200 ml   Output              480 ml   Net             1720 ml       Weight: 63.6 kg (140 lb 3.4 oz)  Body mass index is 25.65 kg/m².    Recent Labs      18   1450  18   0436   SODIUM  127*  130*   POTASSIUM  5.1  5.5   CHLORIDE  99  105   CO2  16*  21   BUN  48*  42*   CREATININE  2.08*  1.89*   MAGNESIUM  1.5   --    CALCIUM  9.3  8.0*       GI/Nutrition:  Recent Labs      18   1450  18   0436   ALTSGPT  365*  324*   ASTSGOT  831*  661*   ALKPHOSPHAT  73  63   TBILIRUBIN  19.2*  16.9*   GLUCOSE  83  99       Heme:  Recent Labs      18   1450  18   2331  18   0436   RBC  3.72*   --   3.38*   HEMOGLOBIN  12.7   --   11.6*   HEMATOCRIT  36.6*   --   33.7*   PLATELETCT  134*   --   146*   PROTHROMBTM  53.7*   --    --    APTT  53.0*   --    --    INR  6.03*   --    --    FERRITIN   --   1087.1*   --        Infectious Disease:  Temp  Av.1 °C (96.9 °F)  Min: 36 °C (96.8 °F)  Max: 36.2 °C (97.2 °F)  Recent Labs      18   1450  18   0436   WBC  9.1  7.5   NEUTSPOLYS  72.30*  71.20   LYMPHOCYTES  7.40*  10.60*   MONOCYTES  16.30*  13.50*   EOSINOPHILS  1.80  2.90   BASOPHILS  0.70  0.70   ASTSGOT  831*  661*   ALTSGPT  365*  324*   ALKPHOSPHAT  73  63   TBILIRUBIN  19.2*  16.9*       Meds:  • traZODone  100 mg     • [START ON 2018] thiamine  100 mg      And   • [START ON 2018] folic acid  1 mg      And   • [START ON 2018] multivitamin  1 Tab     • midodrine  5 mg     • NS       • NS  500 mL     • NS  500 mL     • magnesium sulfate  4 g 4 g (18 1103)    • NS   125 mL/hr at 04/23/18 0803   • Respiratory Care per Protocol       • budesonide-formoterol  2 Puff     • tiotropium  1 Cap        Problem and Plan:    Acute liver failure without hepatic coma  Jaundice, fatigue, poor PO intake.   No fever, chills, abdominal pain, any GI bleeding.   Drinks 3 glasses of wine a day for many years    Marked juandice and bruising   Mental status within normal limits. No asterixis. No hepatic encephalopathy.    Initial labs , , alk phos 73, total bili 19.2, albumin 2.1, total protein 5.3, ammonia 51  INR 6.03, PTT 53.7, APTT 53. Ferritin very high, transferrin low. Acute hepatitis panel negative. TSH WNL.   Pending SUMAN, AMA, ASMA, A1AT, ceruloplasmin  Hep B Core total antibody, Hep C viral RNA QUALITATIVE,  Hep B surface antibody. TEG study unremarkable      Abdominal ultrasound suggests cirrhosis     MELD score 40, MELD sodium 40, mortality 71.3 %  Child Valenzuela C    - [facility], recommended hydration and give IV vitamin K 10 mg for coagulopathy, monitor liver/kidney function. Appreciate input regarding benefit from steroids.     - hold home apixaban due to child valenzuela C and BLAYNE   - renal/hepatic diet    Acute kidney injury (CMS-HCC)  Poor PO intake, high BUN/Cr ratio, likely from dehydration  Cr 2.08 (baseline Cr 0.85)  BUN/Cr consistent with pre-renal, Improving with re-hydration      Jese 2.3 %, however patient was on diuretics, UA hyaline casts, no active sediment      Renal ultrasound unremarkable      Improving with re-hydration   - Cont IV hydration  - holding home spironolactone, losartan  - renally dose meds    Lactic acidosis  LA 4.4-> 2.3, improved with IV fluid  Improved to 1.6 with IV fluid    Hypotension  Improving somewhat with IV fluid  -Hold home BP meds [atenolol 50 mg, losartan 25 mg, spironolactone 25 mg]  -Cont Midodrine 5 mg TID     Abnormal CXR  - asymptomatic  - pCXR 1.  Unusual wedge-shaped  opacification noted in right lower hemithorax adjacent to the right heart border. This could represent collapse of a portion of the right lung. Underlying carcinoma is a possibility. Fibrosis pneumonia or loculated fluid are also   possibilities.  2.  Minimal blunting of costophrenic angles bilaterally.  - received ceftriaxone x1 and doxycycline in ED.   - Does not look infectious clinically.   - monitor s/s of infection, check procalcitonin  - consider CT chest with contrast when her BLAYNE improves  - supportive, IS, O2, RT    COPD (chronic obstructive pulmonary disease) (CMS-[facility])  Not on home oxygen  PFT Aug 2017 Ratio 61% FEV1 63%, DLCO WNL   - O2, RT, continue home spiriva, symbicort    Compression fracture of L1 lumbar vertebra (CMS-[facility])  Moderate anterior wedge compression fracture on X ray  Hx of fall many days ago, patient does not remember when  - Fall precaution   - can use lidocaine patch for pain if needed [avoid NSAIDs]    Atrial fibrillation (CMS-HCC)  On atenolol 50 mg, currently holding given her hypotension     - Cardiac monitoring   - holding apixaban due to liver failure, elevated INR and BLAYNE      Quality Measures:  Lines:    PIV, will mostly need a line soon   Sanchez Catheter:  No  DVT Prophylaxis:   SCDs, high risk of bleeding   Ulcer Prophylaxis:  N/A  Antibiotics:   N/A    CODE STATUS:   FULL  DISPO:    Inpatient

## 2018-04-23 NOTE — DISCHARGE PLANNING
Transitional Care Navigator:    Chart reviewed for post acute needs.  Pt is a 77 yof who lives locally with her . Her LACE+ is 72, and mobility so far has been minimal.   Please consider an order for PT/OT to assist in determining appropriate support at time of discharge.

## 2018-04-23 NOTE — H&P
Internal Medicine Admitting History and Physical    Note Author: Bernie Ray M.D.       Name Rox Valdez     1940   Age/Sex 77 y.o. female   MRN [id number]   Code Status Full     After 5PM or if no immediate response to page, please call for cross-coverage  Attending/Team: Dr. Milton / GOLD See Patient List for primary contact information  Call [phone] to page    1st Call - Day Intern (R1):    2nd Call - Day Sr. Resident (R2/R3):   Dr. Oliver       Chief Complaint:  Fatigue, lightheadness x 2 days  Itching x months  Jaundice x 2 days  Poor PO intake for many days    HPI:  77 year old female with PMH ox of Atrial fibrillationon apixaban, COPD not on home oxygen presented with complaints of fatigue and dizziness for 2 days. Patient notes she has been doing well overall however in the last few months she developed generalized pruritus, progressively worsening. She saw a dermatologist who started her on topical treatment for about 1 month ago, however the pruritus continued. Over the past 2 days, her  had noted that patient was jaundiced, which is new for the patient. Patient and her family deny history of liver disease or jaundice in the past. Then this morning, patient noted she was lightheaded therefore called for EMS. Triage note indicated BP was 60s/40s with HR in 110s. Improve to 97/54 with IV fluid.    She drinks 2-3 glasses wine per day for many years. She takes Tylenol from time to time, never overdosed per patient. Last dose of tylenol was two pills yesterday, she does not take tylenol every day.    Patient denies fevers, chills, nasal congestion, nausea, vomiting, heartburn, abdominal pain, hematemesis, bright red blood per rectum, melena, diarrhea, constipation. Denies chest pain, shortness of breath, palpitations, edema.     She fell many days ago after tripping on her own foot.    WBC 9.1, H&H is 12.7 and 36.6, MCV 98.4, platelet 134, neutrophils 72.3%, sodium  1.7, potassium 5.1, bicarb 16, Hg 12, P1 48, creatinine 2.08, GFR 23, , , alk phos 73, total bili 19.2, albumin 2.1, total protein 5.3, ammonia 51, INR 6.03, PTT 53.7, APTT 53  LA 4.4-> 2.3, improved with IV fluid  Acute hepatitis panel negative.     In ED, she received 1.5 L NS, ceftriaxone x1, doxycycline 100 mg IV x1.    Review of Systems   Constitutional: Negative for chills and fever.   HENT: Negative for hearing loss, sinus pain and sore throat.    Eyes: Negative for blurred vision, photophobia, discharge and redness.   Respiratory: Negative for cough and shortness of breath.         Dry cough +. Does not require O2 at baseline   Cardiovascular: Negative for chest pain, palpitations, orthopnea and leg swelling.   Gastrointestinal: Negative for abdominal pain, blood in stool, constipation, diarrhea, nausea and vomiting.   Genitourinary: Negative for hematuria.   Musculoskeletal: Positive for falls.        H/o recent fall   Skin: Negative for itching and rash.   Neurological: Positive for dizziness. Negative for focal weakness, seizures, weakness and headaches.             Past Medical History:   Past Medical History:   Diagnosis Date   • Atrial fibrillation (CMS-HCC)    • COPD (chronic obstructive pulmonary disease) (CMS-HCC)    • Cor pulmonale (CMS-HCC)    • HTN (hypertension)    • Long term (current) use of anticoagulants    • PVC (premature ventricular contraction)    • Unspecified menopausal and postmenopausal disorder        Past Surgical History:  Past Surgical History:   Procedure Laterality Date   • CATARACT EXTRACTION WITH IOL     • CHOLECYSTECTOMY      LAPAROSCOPIC.   • KNEE ARTHROSCOPY     • RETINAL DETACHMENT REPAIR         Current Outpatient Medications:  Home Medications     Reviewed by Renetta Ho, Pharmacy Intern (Pharmacy Intern) on 04/22/18 at 1515  Med List Status: Complete   Medication Last Dose Status   atenolol (TENORMIN) 50 MG Tab 4/22/2018 Active  "  budesonide-formoterol (SYMBICORT) 160-4.5 MCG/ACT Aerosol 4/22/2018 Active   ELIQUIS 5 MG Tab 4/22/2018 Active   losartan (COZAAR) 25 MG Tab 4/22/2018 Active   spironolactone (ALDACTONE) 25 MG Tab 4/22/2018 Active   tiotropium (SPIRIVA HANDIHALER) 18 MCG Cap 4/22/2018 Active                Medication Allergy/Sensitivities:  No Known Allergies      Family History:  Family History   Problem Relation Age of Onset   • Hypertension Mother    • Heart Disease Brother      Atrial Fibrillation.       Social History:  Social History     Social History   • Marital status:      Spouse name: N/A   • Number of children: N/A   • Years of education: N/A     Occupational History   • Not on file.     Social History Main Topics   • Smoking status: Former Smoker     Packs/day: 1.00     Years: 28.00     Types: Cigarettes     Start date: 1/1/1953     Quit date: 1/1/1981   • Smokeless tobacco: Never Used   • Alcohol use 3.0 - 6.0 oz/week           Comment: 2-3 glasses of wine daily, 21 drinks per week.   • Drug use: No   • Sexual activity: Not on file     Other Topics Concern   • Not on file     Social History Narrative   • No narrative on file     Living situation: Lives with  in [place].  PCP: VIVIAN REY      Physical Exam     Vitals:    04/22/18 1410 04/22/18 1500 04/22/18 1530 04/22/18 1630   BP:       Pulse: (!) 42 (!) 105 (!) 42 (!) 102   Resp: 16   16   Temp:       SpO2: 94% 100% 89% 91%   Weight:       Height:         Body mass index is 24.6 kg/m².  BP (!) 83/56   Pulse (!) 103   Temp 35.9 °C (96.6 °F)   Resp 17   Ht 1.575 m (5' 2\")   Wt 61 kg (134 lb 7.7 oz)   SpO2 95%   BMI 24.60 kg/m²   O2 therapy: Pulse Oximetry: 91 %, O2 (LPM): 2, O2 Delivery: Nasal Cannula    Physical Exam   Constitutional: She is oriented to person, place, and time. No distress.   HENT:   Head: Normocephalic and atraumatic.   Mouth/Throat: No oropharyngeal exudate.   Dry mouth   Eyes: Conjunctivae and EOM are normal. " Pupils are equal, round, and reactive to light. Scleral icterus is present.   Neck: Normal range of motion. No tracheal deviation present.   Pulmonary/Chest: Effort normal. No stridor. No respiratory distress.   Decreased breath sounds bilaterally.   Abdominal: Soft. There is no tenderness. There is no rebound and no guarding.   Musculoskeletal: She exhibits no edema.   Lymphadenopathy:     She has no cervical adenopathy.   Neurological: She is alert and oriented to person, place, and time. GCS score is 15.   No asterixis   Skin: Skin is warm and dry.   Multiple ecchymosis   Psychiatric: Mood, memory, affect and judgment normal.   Nursing note and vitals reviewed.            Data Review       Old Records Request:   Completed  Current Records review and summary: Completed    Lab Data Review:  Recent Results (from the past 24 hour(s))   EKG NOW    Collection Time: 18  2:20 PM   Result Value Ref Range    Report       [facility] Emergency Dept.    Test Date:  2018  Pt Name:    NADER GRANT               Department: ER  MRN:        [id number]                      Room:        15  Gender:     Female                       Technician: [id number]  :        [id number]                   Requested By:MANUELA GUDINO  Order #:    [id number]                    Reading MD:    Measurements  Intervals                                Axis  Rate:       105                          P:  IL:                                      QRS:        105  QRSD:       100                          T:          -37  QT:         392  QTc:        519    Interpretive Statements  ATRIAL FIBRILLATION  MULTIPLE VENTRICULAR PREMATURE COMPLEXES  RIGHT AXIS DEVIATION  CONSIDER ANTEROSEPTAL INFARCT  BORDERLINE T ABNORMALITIES, INFERIOR LEADS  PROLONGED QT INTERVAL  No previous ECG available for comparison     CBC WITH DIFFERENTIAL    Collection Time: 18  2:50 PM   Result Value Ref Range    WBC 9.1 4.8 - 10.8 K/uL    RBC 3.72  (L) 4.20 - 5.40 M/uL    Hemoglobin 12.7 12.0 - 16.0 g/dL    Hematocrit 36.6 (L) 37.0 - 47.0 %    MCV 98.4 (H) 81.4 - 97.8 fL    MCH 34.1 (H) 27.0 - 33.0 pg    MCHC 34.7 33.6 - 35.0 g/dL    RDW 61.5 (H) 35.9 - 50.0 fL    Platelet Count 134 (L) 164 - 446 K/uL    MPV 11.0 9.0 - 12.9 fL    Neutrophils-Polys 72.30 (H) 44.00 - 72.00 %    Lymphocytes 7.40 (L) 22.00 - 41.00 %    Monocytes 16.30 (H) 0.00 - 13.40 %    Eosinophils 1.80 0.00 - 6.90 %    Basophils 0.70 0.00 - 1.80 %    Immature Granulocytes 1.50 (H) 0.00 - 0.90 %    Nucleated RBC 0.00 /100 WBC    Neutrophils (Absolute) 6.59 2.00 - 7.15 K/uL    Lymphs (Absolute) 0.67 (L) 1.00 - 4.80 K/uL    Monos (Absolute) 1.48 (H) 0.00 - 0.85 K/uL    Eos (Absolute) 0.16 0.00 - 0.51 K/uL    Baso (Absolute) 0.06 0.00 - 0.12 K/uL    Immature Granulocytes (abs) 0.14 (H) 0.00 - 0.11 K/uL    NRBC (Absolute) 0.00 K/uL   COMP METABOLIC PANEL    Collection Time: 04/22/18  2:50 PM   Result Value Ref Range    Sodium 127 (L) 135 - 145 mmol/L    Potassium 5.1 3.6 - 5.5 mmol/L    Chloride 99 96 - 112 mmol/L    Co2 16 (L) 20 - 33 mmol/L    Anion Gap 12.0 (H) 0.0 - 11.9    Glucose 83 65 - 99 mg/dL    Bun 48 (H) 8 - 22 mg/dL    Creatinine 2.08 (H) 0.50 - 1.40 mg/dL    Calcium 9.3 8.5 - 10.5 mg/dL    AST(SGOT) 831 (H) 12 - 45 U/L    ALT(SGPT) 365 (H) 2 - 50 U/L    Alkaline Phosphatase 73 30 - 99 U/L    Total Bilirubin 19.2 (H) 0.1 - 1.5 mg/dL    Albumin 2.1 (L) 3.2 - 4.9 g/dL    Total Protein 5.3 (L) 6.0 - 8.2 g/dL    Globulin 3.2 1.9 - 3.5 g/dL    A-G Ratio 0.7 g/dL   TROPONIN    Collection Time: 04/22/18  2:50 PM   Result Value Ref Range    Troponin I 0.02 0.00 - 0.04 ng/mL   APTT    Collection Time: 04/22/18  2:50 PM   Result Value Ref Range    APTT 53.0 (H) 24.7 - 36.0 sec   PROTHROMBIN TIME (INR)    Collection Time: 04/22/18  2:50 PM   Result Value Ref Range    PT 53.7 (H) 12.0 - 14.6 sec    INR 6.03 (H) 0.87 - 1.13   LACTIC ACID    Collection Time: 04/22/18  2:50 PM   Result Value Ref  Range    Lactic Acid 4.4 (HH) 0.5 - 2.0 mmol/L   ACETAMINOPHEN    Collection Time: 04/22/18  2:50 PM   Result Value Ref Range    Acetaminophen -Tylenol <10 10 - 30 ug/mL   HEPATITIS PANEL ACUTE(4 COMPONENTS)    Collection Time: 04/22/18  2:50 PM   Result Value Ref Range    Hepatitis B Surface Antigen Negative Negative    Hepatitis C Antibody Negative Negative    Hepatitis B Cors Ab,IgM Negative Negative    Hepatitis A Virus Ab, IgM Negative Negative   MAGNESIUM    Collection Time: 04/22/18  2:50 PM   Result Value Ref Range    Magnesium 1.5 1.5 - 2.5 mg/dL   ESTIMATED GFR    Collection Time: 04/22/18  2:50 PM   Result Value Ref Range    GFR If  28 (A) >60 mL/min/1.73 m 2    GFR If Non African American 23 (A) >60 mL/min/1.73 m 2   AMMONIA    Collection Time: 04/22/18  3:38 PM   Result Value Ref Range    Ammonia 51 (H) 11 - 45 umol/L   LACTIC ACID    Collection Time: 04/22/18  6:10 PM   Result Value Ref Range    Lactic Acid 2.3 (H) 0.5 - 2.0 mmol/L       Imaging/Procedures Review:    ndependant Imaging Review: Completed  US-GALLBLADDER   Final Result      1.  Lobular liver surface and heterogeneous liver echogenicity are consistent with cirrhosis.      2.  Post cholecystectomy.      3.  No biliary ductal dilatation appreciated.         DX-CHEST-PORTABLE (1 VIEW)   Final Result         1.  Unusual wedge-shaped opacification noted in right lower hemithorax adjacent to the right heart border. This could represent collapse of a portion of the right lung. Underlying carcinoma is a possibility. Fibrosis pneumonia or loculated fluid are also    possibilities.      2.  Minimal blunting of costophrenic angles bilaterally.      DX-LUMBAR SPINE-2 OR 3 VIEWS   Final Result      1.  Moderate anterior wedge compression fracture mainly affecting the superior endplate of L1 is identified with some retropulsion as described above. This could potentially represent a chronic or old traumatic fracture or insufficiency  fracture. Acute    fracture is probably less likely given smoothly marginated deformed superior endplate.      2.  Moderate degenerative disc disease and facet arthropathy.      CT-HEAD W/O   Final Result      No acute intracranial abnormality is identified.      Atrophy      There are mild periventricular and subcortical white matter changes present.  This finding is nonspecific and could be from previous small vessel ischemia, demyelination, or gliosis.             EKG:   EKG Independant Review: Completed  QTc: 519, HR: 105, atrial fibrillation    (x) Records reviewed and summarized in current documentation             Assessment/Plan     Acute kidney injury (CMS-HCC)- (present on admission)   Assessment & Plan    - Cr 2.08 (baseline Cr 0.85)   - poor PO intake, high BUN/Cr ratio, likely from dehydration  plan  - IV hydration  - renal ultrasound  - hold home spironolactone, losartan  - avoid nephrotoxin  - renally dose meds  - urinalysis, urine lytes, urine urea, consider to calculate FeUrea  - monitor kidney function        Acute liver failure without hepatic coma- (present on admission)   Assessment & Plan    - jaundice, fatigue, poor PO intake. No fever, chills, abdominal pain, any GI bleeding. Patient declined rectal exam.  - Mental status within normal limits. No asterixis. No hepatic encephalopathy.  - , , alk phos 73, total bili 19.2, albumin 2.1, total protein 5.3, ammonia 51, INR 6.03, PTT 53.7, APTT 53  - History of 3 glasses of wine drinking for many years, perhaps ETOH related?  - intermittent tylenol, does not take daily, no hx of overdose  - MELD score 40, MELD sodium 40, mortality 71.3 %  - Child Valenzuela C  - Shimon 199-211, consider prednisolone, when infection is ruled out.  - Acute hepatitis panel negative.  - Abdominal ultrasound suggests cirrhosis   - elevated ammonia.   -  INR 6.03, PTT 53.7, APTT 53 suggests coagulopathy  Plan  - GI consult. Called [facility]. Dr. Miller  called back. Day team please contact digestive [facility] again in AM.  - Per GI, hydration and give IV vitamin K 10 mg for coagulopathy, monitor liver/kidney function, avoid liver toxins.  - hold home apixaban due to child han C and BLAYNE   - consider to request records from [facility] in AM.  - check TEG study, INR/PT/PTT  - renal/hepatic diet          Compression fracture of L1 lumbar vertebra (CMS-HCC)- (present on admission)   Assessment & Plan    - Moderate anterior wedge compression fracture on X ray  - hx of fall many days ago, patient does not remember when  - supportive  - fall precaution        Abnormal CXR- (present on admission)   Assessment & Plan    - asymptomatic  - pCXR 1.  Unusual wedge-shaped opacification noted in right lower hemithorax adjacent to the right heart border. This could represent collapse of a portion of the right lung. Underlying carcinoma is a possibility. Fibrosis pneumonia or loculated fluid are also   possibilities.  2.  Minimal blunting of costophrenic angles bilaterally.  - received ceftriaxone x1 and doxycycline in ED.   - Does not look infectious clinically.   - monitor s/s of infection, check procalcitonin  - consider CT chest with contrast when her BLAYNE improves  - supportive, IS, O2, RT        Hypotension   Assessment & Plan    - improving with IV fluid  - encourage PO intake  - hold home BP meds        Lactic acidosis- (present on admission)   Assessment & Plan    - LA 4.4-> 2.3, improved with IV fluid  - improving with IV fluid        Atrial fibrillation (CMS-HCC)- (present on admission)   Assessment & Plan    - rate control  - renally dose atenolol with parameters.  - Cardiac monitoring  - renal/hepatic diet  - hold apixaban due to liver failure and BLAYNE        COPD (chronic obstructive pulmonary disease) (CMS-HCC)- (present on admission)   Assessment & Plan    - not on home oxygen  - O2, RT, continue home spiriva, symbicort          SCD for DVT  prophylaxis    Anticipated Hospital stay:  >2 midnights        Quality Measures  Quality-Core Measures   Reviewed items::  Medications reviewed, Labs reviewed, EKG reviewed and Radiology images reviewed  Sanchez catheter::  No Sanchez  DVT prophylaxis pharmacological::  Contraindicated - High bleeding risk (Supratherapeutic INR)  DVT prophylaxis - mechanical:  SCDs  Ulcer Prophylaxis::  Not indicated

## 2018-04-23 NOTE — PROGRESS NOTES
Patient hypotensive throughout the night, Dr. Sarmiento notified and aware. 2L LR boluses ordered and administered. BP remains soft.

## 2018-04-23 NOTE — PROGRESS NOTES
Dr. Oliver updated that pt systolic BP is 65 with the manual cuff and 50 with automatic cuff. Manual cuff systolic readings have been 74-86. Patient denies any dizziness. A&O x4. 3rd 500cc bolus given at this time. No urine output yet this shift. Dr. Oliver will come assess patient.

## 2018-04-24 PROBLEM — D68.9 COAGULOPATHY (HCC): Status: ACTIVE | Noted: 2018-04-24

## 2018-04-24 PROBLEM — D64.9 ANEMIA: Status: ACTIVE | Noted: 2018-04-24

## 2018-04-24 PROBLEM — J18.9 CAP (COMMUNITY ACQUIRED PNEUMONIA): Status: ACTIVE | Noted: 2018-04-22

## 2018-04-24 PROBLEM — K72.90 HEPATIC FAILURE (HCC): Status: ACTIVE | Noted: 2018-04-22

## 2018-04-24 LAB
ALBUMIN SERPL BCP-MCNC: 2 G/DL (ref 3.2–4.9)
ALBUMIN/GLOB SERPL: 0.6 G/DL
ALP SERPL-CCNC: 81 U/L (ref 30–99)
ALT SERPL-CCNC: 329 U/L (ref 2–50)
AMMONIA PLAS-SCNC: 50 UMOL/L (ref 11–45)
ANION GAP SERPL CALC-SCNC: 5 MMOL/L (ref 0–11.9)
APTT PPP: 50.1 SEC (ref 24.7–36)
AST SERPL-CCNC: 618 U/L (ref 12–45)
BASOPHILS # BLD AUTO: 0.4 % (ref 0–1.8)
BASOPHILS # BLD: 0.03 K/UL (ref 0–0.12)
BILIRUB SERPL-MCNC: 20.8 MG/DL (ref 0.1–1.5)
BNP SERPL-MCNC: 180 PG/ML (ref 0–100)
BUN SERPL-MCNC: 36 MG/DL (ref 8–22)
CALCIUM SERPL-MCNC: 7.9 MG/DL (ref 8.5–10.5)
CERULOPLASMIN SERPL-MCNC: 25 MG/DL (ref 17–54)
CHLORIDE SERPL-SCNC: 105 MMOL/L (ref 96–112)
CO2 SERPL-SCNC: 18 MMOL/L (ref 20–33)
CREAT SERPL-MCNC: 1.62 MG/DL (ref 0.5–1.4)
EOSINOPHIL # BLD AUTO: 0.04 K/UL (ref 0–0.51)
EOSINOPHIL NFR BLD: 0.6 % (ref 0–6.9)
ERYTHROCYTE [DISTWIDTH] IN BLOOD BY AUTOMATED COUNT: 62.7 FL (ref 35.9–50)
GLOBULIN SER CALC-MCNC: 3.1 G/DL (ref 1.9–3.5)
GLUCOSE SERPL-MCNC: 136 MG/DL (ref 65–99)
HCT VFR BLD AUTO: 34.8 % (ref 37–47)
HGB BLD-MCNC: 12.2 G/DL (ref 12–16)
IGG1 SER-MCNC: 903 MG/DL (ref 240–1118)
IGG2 SER-MCNC: 330 MG/DL (ref 124–549)
IGG3 SER-MCNC: 358 MG/DL (ref 21–134)
IGG4 SER-MCNC: 36 MG/DL (ref 1–123)
IMM GRANULOCYTES # BLD AUTO: 0.1 K/UL (ref 0–0.11)
IMM GRANULOCYTES NFR BLD AUTO: 1.4 % (ref 0–0.9)
INR PPP: 3.04 (ref 0.87–1.13)
IRON SATN MFR SERPL: 85 % (ref 15–55)
IRON SERPL-MCNC: 157 UG/DL (ref 40–170)
LV EJECT FRACT  99904: 70
LV EJECT FRACT MOD 2C 99903: 53.08
LV EJECT FRACT MOD 4C 99902: 73.05
LV EJECT FRACT MOD BP 99901: 65.9
LYMPHOCYTES # BLD AUTO: 0.33 K/UL (ref 1–4.8)
LYMPHOCYTES NFR BLD: 4.5 % (ref 22–41)
MCH RBC QN AUTO: 34.6 PG (ref 27–33)
MCHC RBC AUTO-ENTMCNC: 35.1 G/DL (ref 33.6–35)
MCV RBC AUTO: 98.6 FL (ref 81.4–97.8)
MONOCYTES # BLD AUTO: 0.47 K/UL (ref 0–0.85)
MONOCYTES NFR BLD AUTO: 6.5 % (ref 0–13.4)
NEUTROPHILS # BLD AUTO: 6.29 K/UL (ref 2–7.15)
NEUTROPHILS NFR BLD: 86.6 % (ref 44–72)
NRBC # BLD AUTO: 0 K/UL
NRBC BLD-RTO: 0 /100 WBC
PLATELET # BLD AUTO: 147 K/UL (ref 164–446)
PMV BLD AUTO: 11.2 FL (ref 9–12.9)
POTASSIUM SERPL-SCNC: 4.7 MMOL/L (ref 3.6–5.5)
PROT SERPL-MCNC: 5.1 G/DL (ref 6–8.2)
PROTHROMBIN TIME: 31.2 SEC (ref 12–14.6)
RBC # BLD AUTO: 3.53 M/UL (ref 4.2–5.4)
SODIUM SERPL-SCNC: 128 MMOL/L (ref 135–145)
TIBC SERPL-MCNC: 185 UG/DL (ref 250–450)
WBC # BLD AUTO: 7.3 K/UL (ref 4.8–10.8)

## 2018-04-24 PROCEDURE — A9270 NON-COVERED ITEM OR SERVICE: HCPCS | Performed by: INTERNAL MEDICINE

## 2018-04-24 PROCEDURE — 770022 HCHG ROOM/CARE - ICU (200)

## 2018-04-24 PROCEDURE — 3E043XZ INTRODUCTION OF VASOPRESSOR INTO CENTRAL VEIN, PERCUTANEOUS APPROACH: ICD-10-PCS | Performed by: INTERNAL MEDICINE

## 2018-04-24 PROCEDURE — 83550 IRON BINDING TEST: CPT

## 2018-04-24 PROCEDURE — A9270 NON-COVERED ITEM OR SERVICE: HCPCS | Performed by: HOSPITALIST

## 2018-04-24 PROCEDURE — 93306 TTE W/DOPPLER COMPLETE: CPT

## 2018-04-24 PROCEDURE — 80053 COMPREHEN METABOLIC PANEL: CPT

## 2018-04-24 PROCEDURE — 700102 HCHG RX REV CODE 250 W/ 637 OVERRIDE(OP): Performed by: INTERNAL MEDICINE

## 2018-04-24 PROCEDURE — 93306 TTE W/DOPPLER COMPLETE: CPT | Mod: 26 | Performed by: INTERNAL MEDICINE

## 2018-04-24 PROCEDURE — 85025 COMPLETE CBC W/AUTO DIFF WBC: CPT

## 2018-04-24 PROCEDURE — 700111 HCHG RX REV CODE 636 W/ 250 OVERRIDE (IP): Performed by: HOSPITALIST

## 2018-04-24 PROCEDURE — 700111 HCHG RX REV CODE 636 W/ 250 OVERRIDE (IP): Performed by: INTERNAL MEDICINE

## 2018-04-24 PROCEDURE — 82140 ASSAY OF AMMONIA: CPT

## 2018-04-24 PROCEDURE — 85610 PROTHROMBIN TIME: CPT

## 2018-04-24 PROCEDURE — 700102 HCHG RX REV CODE 250 W/ 637 OVERRIDE(OP): Performed by: HOSPITALIST

## 2018-04-24 PROCEDURE — 700105 HCHG RX REV CODE 258: Performed by: INTERNAL MEDICINE

## 2018-04-24 PROCEDURE — 83540 ASSAY OF IRON: CPT

## 2018-04-24 PROCEDURE — 83880 ASSAY OF NATRIURETIC PEPTIDE: CPT

## 2018-04-24 PROCEDURE — 700101 HCHG RX REV CODE 250

## 2018-04-24 PROCEDURE — 700101 HCHG RX REV CODE 250: Performed by: INTERNAL MEDICINE

## 2018-04-24 PROCEDURE — 85730 THROMBOPLASTIN TIME PARTIAL: CPT

## 2018-04-24 RX ORDER — NOREPINEPHRINE BITARTRATE 1 MG/ML
INJECTION, SOLUTION INTRAVENOUS
Status: COMPLETED
Start: 2018-04-24 | End: 2018-04-24

## 2018-04-24 RX ORDER — SODIUM CHLORIDE 9 MG/ML
INJECTION, SOLUTION INTRAVENOUS
Status: ACTIVE
Start: 2018-04-24 | End: 2018-04-24

## 2018-04-24 RX ORDER — DOXYCYCLINE 100 MG/1
100 TABLET ORAL EVERY 12 HOURS
Status: DISCONTINUED | OUTPATIENT
Start: 2018-04-24 | End: 2018-04-25

## 2018-04-24 RX ADMIN — SODIUM CHLORIDE: 9 INJECTION, SOLUTION INTRAVENOUS at 17:37

## 2018-04-24 RX ADMIN — HYDROCORTISONE SODIUM SUCCINATE 50 MG: 100 INJECTION, POWDER, FOR SOLUTION INTRAMUSCULAR; INTRAVENOUS at 01:00

## 2018-04-24 RX ADMIN — BUDESONIDE AND FORMOTEROL FUMARATE DIHYDRATE 2 PUFF: 160; 4.5 AEROSOL RESPIRATORY (INHALATION) at 19:16

## 2018-04-24 RX ADMIN — VASOPRESSIN 0.03 UNITS/MIN: 20 INJECTION INTRAVENOUS at 08:24

## 2018-04-24 RX ADMIN — DOXYCYCLINE 100 MG: 100 TABLET ORAL at 19:16

## 2018-04-24 RX ADMIN — THERA TABS 1 TABLET: TAB at 08:19

## 2018-04-24 RX ADMIN — MIDODRINE HYDROCHLORIDE 5 MG: 5 TABLET ORAL at 11:20

## 2018-04-24 RX ADMIN — DOXYCYCLINE 100 MG: 100 TABLET ORAL at 08:19

## 2018-04-24 RX ADMIN — HYDROCORTISONE SODIUM SUCCINATE 50 MG: 100 INJECTION, POWDER, FOR SOLUTION INTRAMUSCULAR; INTRAVENOUS at 11:21

## 2018-04-24 RX ADMIN — SODIUM CHLORIDE 1000 ML: 9 INJECTION, SOLUTION INTRAVENOUS at 08:22

## 2018-04-24 RX ADMIN — VASOPRESSIN 0.03 UNITS/MIN: 20 INJECTION INTRAVENOUS at 19:16

## 2018-04-24 RX ADMIN — TIOTROPIUM BROMIDE 1 CAPSULE: 18 CAPSULE ORAL; RESPIRATORY (INHALATION) at 08:24

## 2018-04-24 RX ADMIN — HYDROCORTISONE SODIUM SUCCINATE 50 MG: 100 INJECTION, POWDER, FOR SOLUTION INTRAMUSCULAR; INTRAVENOUS at 04:53

## 2018-04-24 RX ADMIN — MIDODRINE HYDROCHLORIDE 5 MG: 5 TABLET ORAL at 08:19

## 2018-04-24 RX ADMIN — CEFTRIAXONE SODIUM 1 G: 10 INJECTION, POWDER, FOR SOLUTION INTRAVENOUS at 08:24

## 2018-04-24 RX ADMIN — NOREPINEPHRINE BITARTRATE 14 MCG/MIN: 1 INJECTION INTRAVENOUS at 08:23

## 2018-04-24 RX ADMIN — NOREPINEPHRINE BITARTRATE 8 MG: 1 INJECTION INTRAVENOUS at 04:50

## 2018-04-24 RX ADMIN — HYDROCORTISONE SODIUM SUCCINATE 50 MG: 100 INJECTION, POWDER, FOR SOLUTION INTRAMUSCULAR; INTRAVENOUS at 22:48

## 2018-04-24 RX ADMIN — SODIUM CHLORIDE: 9 INJECTION, SOLUTION INTRAVENOUS at 01:01

## 2018-04-24 RX ADMIN — MIDODRINE HYDROCHLORIDE 5 MG: 5 TABLET ORAL at 17:37

## 2018-04-24 RX ADMIN — Medication 100 MG: at 08:20

## 2018-04-24 RX ADMIN — HYDROCORTISONE SODIUM SUCCINATE 50 MG: 100 INJECTION, POWDER, FOR SOLUTION INTRAMUSCULAR; INTRAVENOUS at 17:37

## 2018-04-24 RX ADMIN — TRAZODONE HYDROCHLORIDE 100 MG: 100 TABLET ORAL at 19:16

## 2018-04-24 RX ADMIN — BUDESONIDE AND FORMOTEROL FUMARATE DIHYDRATE 2 PUFF: 160; 4.5 AEROSOL RESPIRATORY (INHALATION) at 08:20

## 2018-04-24 RX ADMIN — FOLIC ACID 1 MG: 1 TABLET ORAL at 08:19

## 2018-04-24 ASSESSMENT — ENCOUNTER SYMPTOMS
BLOOD IN STOOL: 0
BRUISES/BLEEDS EASILY: 1
SHORTNESS OF BREATH: 1
ABDOMINAL PAIN: 0
NAUSEA: 0
FEVER: 0
INSOMNIA: 1
SEIZURES: 0
VOMITING: 0
COUGH: 1
DOUBLE VISION: 0
DEPRESSION: 0
DIARRHEA: 0
LOSS OF CONSCIOUSNESS: 0
WEAKNESS: 1
FOCAL WEAKNESS: 0
NAUSEA: 1
EYES NEGATIVE: 1
HALLUCINATIONS: 0
SINUS PAIN: 0
SPUTUM PRODUCTION: 0
SPEECH CHANGE: 0
COUGH: 0
HEMOPTYSIS: 0
DIZZINESS: 0
CHILLS: 0
SORE THROAT: 0
DIAPHORESIS: 0
HEADACHES: 0
PALPITATIONS: 0
FALLS: 0
BLURRED VISION: 0

## 2018-04-24 ASSESSMENT — PAIN SCALES - GENERAL
PAINLEVEL_OUTOF10: 6
PAINLEVEL_OUTOF10: 0
PAINLEVEL_OUTOF10: 2
PAINLEVEL_OUTOF10: 0
PAINLEVEL_OUTOF10: 0
PAINLEVEL_OUTOF10: 3
PAINLEVEL_OUTOF10: 0

## 2018-04-24 ASSESSMENT — PATIENT HEALTH QUESTIONNAIRE - PHQ9
2. FEELING DOWN, DEPRESSED, IRRITABLE, OR HOPELESS: NOT AT ALL
1. LITTLE INTEREST OR PLEASURE IN DOING THINGS: NOT AT ALL
SUM OF ALL RESPONSES TO PHQ9 QUESTIONS 1 AND 2: 0

## 2018-04-24 ASSESSMENT — COPD QUESTIONNAIRES
COPD SCREENING SCORE: 6
DO YOU EVER COUGH UP ANY MUCUS OR PHLEGM?: NO/ONLY WITH OCCASIONAL COLDS OR INFECTIONS
DURING THE PAST 4 WEEKS HOW MUCH DID YOU FEEL SHORT OF BREATH: SOME OF THE TIME
HAVE YOU SMOKED AT LEAST 100 CIGARETTES IN YOUR ENTIRE LIFE: YES

## 2018-04-24 NOTE — PROGRESS NOTES
Gastroenterology Progress Note     Author: Jony Spearhzadeh   Date & Time Created: 4/24/2018 1:29 PM    Chief Complaint:  Jaundice    Interval History:  Nauseated this morning but no vomiting.    Denies any new medication, herbal/OTC supplements. Takes <1gm Tylenol/day.    Review of Systems:  Review of Systems   Constitutional: Negative for chills and fever.   Gastrointestinal: Positive for nausea. Negative for blood in stool and melena.       Physical Exam:  Physical Exam   Constitutional: She is oriented to person, place, and time. She appears distressed.   Eyes: Scleral icterus is present.   Cardiovascular: Normal rate and regular rhythm.    Pulmonary/Chest: Effort normal and breath sounds normal.   Abdominal: Soft. Bowel sounds are normal. She exhibits no distension. There is no tenderness. There is no rebound.   Musculoskeletal: She exhibits edema.   Neurological: She is alert and oriented to person, place, and time.   Skin: Skin is warm and dry.   Nursing note and vitals reviewed.      Labs:        Invalid input(s): [id number]  Recent Labs      04/22/18   1450  04/23/18   1740  04/24/18   0500   TROPONINI  0.02  0.02   --    BNPBTYPENAT   --    --   180*     Recent Labs      04/22/18   1450  04/23/18   0436  04/24/18   0500   SODIUM  127*  130*  128*   POTASSIUM  5.1  5.5  4.7   CHLORIDE  99  105  105   CO2  16*  21  18*   BUN  48*  42*  36*   CREATININE  2.08*  1.89*  1.62*   MAGNESIUM  1.5   --    --    CALCIUM  9.3  8.0*  7.9*     Recent Labs      04/22/18   1450  04/23/18   0436  04/24/18   0500   ALTSGPT  365*  324*  329*   ASTSGOT  831*  661*  618*   ALKPHOSPHAT  73  63  81   TBILIRUBIN  19.2*  16.9*  20.8*   GLUCOSE  83  99  136*     Recent Labs      04/22/18   1450  04/22/18   2331  04/23/18   0436  04/23/18   1740  04/24/18   0500   RBC  3.72*   --   3.38*  3.45*  3.53*   HEMOGLOBIN  12.7   --   11.6*  11.9*  12.2   HEMATOCRIT  36.6*   --   33.7*  34.1*  34.8*   PLATELETCT  134*   --   146*   136*  147*   PROTHROMBTM  53.7*   --    --    --   31.2*   APTT  53.0*   --    --    --   50.1*   INR  6.03*   --    --    --   3.04*   IRON   --    --    --    --   157   FERRITIN   --   1087.1*   --    --    --    TOTIRONBC   --    --    --    --   185*     Recent Labs      18   1450  18   0436  18   1740  18   0500   WBC  9.1  7.5  7.9  7.3   NEUTSPOLYS  72.30*  71.20  70.50  86.60*   LYMPHOCYTES  7.40*  10.60*  7.50*  4.50*   MONOCYTES  16.30*  13.50*  16.40*  6.50   EOSINOPHILS  1.80  2.90  3.20  0.60   BASOPHILS  0.70  0.70  0.60  0.40   ASTSGOT  831*  661*   --   618*   ALTSGPT  365*  324*   --   329*   ALKPHOSPHAT  73  63   --   81   TBILIRUBIN  19.2*  16.9*   --   20.8*     Hemodynamics:  Temp (24hrs), Av.2 °C (97.1 °F), Min:36 °C (96.8 °F), Max:36.3 °C (97.4 °F)  Temperature: 36.1 °C (97 °F)  Pulse  Av.3  Min: 42  Max: 120Heart Rate (Monitored): (!) 110  Blood Pressure : (!) 74/34, NIBP: 100/54  CVP (mm Hg): 5 MM HG  Respiratory:    Respiration: 17, Pulse Oximetry: 99 %        RUL Breath Sounds: Clear;Diminished, RML Breath Sounds: Diminished, RLL Breath Sounds: Diminished, ERICK Breath Sounds: Clear;Diminished, LLL Breath Sounds: Diminished  Fluids:    Intake/Output Summary (Last 24 hours) at 18 1329  Last data filed at 18 1200   Gross per 24 hour   Intake          5904.62 ml   Output              825 ml   Net          5079.62 ml     Weight: 71.2 kg (156 lb 15.5 oz)  GI/Nutrition:  Orders Placed This Encounter   Procedures   • DIET ORDER     Standing Status:   Standing     Number of Occurrences:   1     Order Specific Question:   Diet:     Answer:   Renal [8]     Order Specific Question:   Diet:     Answer:   Hepatic [9]     Medical Decision Making, by Problem:  Active Hospital Problems    Diagnosis   • *Hepatic failure: Appears to have an acute hepatitis on underlying chronic liver disease. Suspect component chronic EtOH liver disease with possible overlapping  AIH.   • Acute kidney injury: Stable.    • Atrial fibrillation   • COPD (chronic obstructive pulmonary disease)   • Anemia   • Coagulopathy        Plan:  Supportive care.  Await additional serology to determine cause of liver disease.  May need transjugular liver biopsy.    Quality-Core Measures   Reviewed items::  Radiology images reviewed, Labs reviewed and Medications reviewed

## 2018-04-24 NOTE — PROGRESS NOTES
Internal Medicine Interval Note  Note Author: Marilyn Gupta M.D.     Name Rox Valdez     1940   Age/Sex 77 y.o. female   MRN [id number]   Code Status Full     After 5PM or if no immediate response to page, please call for cross-coverage  Attending/Team: Dr. Ha / HAZEL Huber  See Patient List for primary contact information  Call [phone] to page    1st Call - Day Sr. Resident (R2/R3):   Dr. Gupta           Reason for interval visit  (Principal Problem)   Acute liver failure without hepatic coma    Interval Problem Daily Status Update  (24 hours)   Patient reports SOB with exertion, require 2 L NC O2.  Reports appetite getting better.   Tele monitor shows afib 100's.   Cortisol level was low, IV hydrocortisone was started.   On 2 pressors - levo & vasopressin.   procalcitonin elevated 0.75, day 2 ceftriaxone & azithro.       Review of Systems   Constitutional: Negative for chills and fever.   HENT:        Jaundice.    Eyes: Negative for blurred vision and double vision.   Respiratory: Positive for shortness of breath. Negative for cough.    Cardiovascular: Negative for chest pain and palpitations.   Gastrointestinal: Negative for abdominal pain, diarrhea, nausea and vomiting.   Genitourinary: Negative for dysuria.   Musculoskeletal: Negative for falls.   Skin: Negative for itching.        Diffuse bruising upper & lower extremities.    Neurological: Negative for dizziness, seizures and loss of consciousness.   Endo/Heme/Allergies: Bruises/bleeds easily.   Psychiatric/Behavioral: Negative for hallucinations.       Consultants/Specialty  GI     Disposition  ICU    Quality Measures  Quality-Core Measures   Reviewed items::  Labs reviewed, Medications reviewed and Radiology images reviewed  Sanchez catheter::  No Sanchez  Central line in place:  Vasopressors  DVT prophylaxis pharmacological::  Contraindicated - High bleeding risk  DVT prophylaxis - mechanical:  SCDs  Ulcer Prophylaxis::  Not  indicated  Antibiotics:  Treating active infection/contamination beyond 24 hours perioperative coverage          Physical Exam       Vitals:    04/24/18 0700 04/24/18 0715 04/24/18 0730 04/24/18 0800   BP:       Pulse: (!) 105 (!) 103 (!) 110    Resp: 18 16 17    Temp:    36 °C (96.8 °F)   SpO2:       Weight:  71.2 kg (156 lb 15.5 oz)     Height:         Body mass index is 28.71 kg/m². Weight: 71.2 kg (156 lb 15.5 oz)  Oxygen Therapy:  Pulse Oximetry: 99 %, O2 (LPM): 1, O2 Delivery: Silicone Nasal Cannula    Physical Exam   Constitutional: She is oriented to person, place, and time.   Looks anxious.   HENT:   Head: Normocephalic and atraumatic.   Eyes: EOM are normal. Pupils are equal, round, and reactive to light. Scleral icterus is present.   Neck: Neck supple.   Cardiovascular: Exam reveals no gallop and no friction rub.    No murmur heard.  Irregularly irregular rhythm, HR ~ 100's.    Pulmonary/Chest: Effort normal. No respiratory distress. She has no wheezes. She has no rales.   Diminished bibasilar breath sounds.    Abdominal: Soft. Bowel sounds are normal. She exhibits no distension. There is no tenderness. There is no guarding.   Musculoskeletal: Normal range of motion. She exhibits no tenderness or deformity.   Neurological: She is alert and oriented to person, place, and time.   Skin: Skin is warm and dry. She is not diaphoretic.   Nursing note and vitals reviewed.        Lab Data Review:       Recent Labs      04/22/18   1450  04/23/18   0436  04/24/18   0500   SODIUM  127*  130*  128*   POTASSIUM  5.1  5.5  4.7   CHLORIDE  99  105  105   CO2  16*  21  18*   BUN  48*  42*  36*   CREATININE  2.08*  1.89*  1.62*   MAGNESIUM  1.5   --    --    CALCIUM  9.3  8.0*  7.9*       Recent Labs      04/22/18   1450  04/23/18   0436  04/24/18   0500   ALTSGPT  365*  324*  329*   ASTSGOT  831*  661*  618*   ALKPHOSPHAT  73  63  81   TBILIRUBIN  19.2*  16.9*  20.8*   GLUCOSE  83  99  136*       Recent Labs       04/22/18   1450  04/22/18   2331  04/23/18   0436  04/23/18   1740  04/24/18   0500   RBC  3.72*   --   3.38*  3.45*  3.53*   HEMOGLOBIN  12.7   --   11.6*  11.9*  12.2   HEMATOCRIT  36.6*   --   33.7*  34.1*  34.8*   PLATELETCT  134*   --   146*  136*  147*   PROTHROMBTM  53.7*   --    --    --   31.2*   APTT  53.0*   --    --    --   50.1*   INR  6.03*   --    --    --   3.04*   IRON   --    --    --    --   157   FERRITIN   --   1087.1*   --    --    --    TOTIRONBC   --    --    --    --   185*       Recent Labs      04/22/18   1450  04/23/18   0436  04/23/18   1740  04/24/18   0500   WBC  9.1  7.5  7.9  7.3   NEUTSPOLYS  72.30*  71.20  70.50  86.60*   LYMPHOCYTES  7.40*  10.60*  7.50*  4.50*   MONOCYTES  16.30*  13.50*  16.40*  6.50   EOSINOPHILS  1.80  2.90  3.20  0.60   BASOPHILS  0.70  0.70  0.60  0.40   ASTSGOT  831*  661*   --   618*   ALTSGPT  365*  324*   --   329*   ALKPHOSPHAT  73  63   --   81   TBILIRUBIN  19.2*  16.9*   --   20.8*           Assessment/Plan     * Acute liver failure without hepatic coma- (present on admission)   Assessment & Plan    Jaundice, fatigue, poor PO intake.   No fever, chills, abdominal pain, any GI bleeding.   Drinks 3 glasses of wine a day for many years.    Marked juandice and bruising   Mental status within normal limits. No asterixis. No hepatic encephalopathy.    Initial labs , , alk phos 73, total bili 19.2, albumin 2.1, total protein 5.3, ammonia 51  INR 6.03, PTT 53.7, APTT 53. Ferritin very high, transferrin low. Acute hepatitis panel negative. HIV negative. TSH WNL.   Hep B Core total antibody, Hep C viral RNA QUALITATIVE,  Hep B surface antibody. TEG study unremarkable.   Pending SUMAN, AMA, ASMA, A1AT, ceruloplasmin.     Abdominal ultrasound suggests cirrhosis.      MELD score 40, MELD sodium 40, mortality 71.3 %  Child Valenzuela C    - [facility], recommended hydration and give IV vitamin K 10 mg for  coagulopathy, monitor liver/kidney function. Appreciate input regarding benefit from steroids.   - slight improvement in AST, ALT with conservative management.  Tbil initially improved to 16's but then trended back to 20's.  - continue to hold home apixaban due to child han C and BLAYNE   - renal/hepatic diet.        Acute kidney injury (CMS-HCC)- (present on admission)   Assessment & Plan    Poor PO intake, high BUN/Cr ratio, likely from dehydration  Cr 2.08 (baseline Cr 0.85)  BUN/Cr consistent with pre-renal, Improving with re-hydration      Jese 2.3 %, however patient was on diuretics, UA hyaline casts, no active sediment      Renal ultrasound unremarkable      Improving with re-hydration & pressors.   - Cont IV hydration & wean pressor as able.   - holding home spironolactone, losartan  - renally dose meds        Abnormal chest x-ray- (present on admission)   Assessment & Plan    - ? CAP,  unusual wedge-shaped opacification noted in right lower hemithorax adjacent to the right heart border. This could represent collapse of a portion of the right lung vs PNA. Underlying carcinoma is a possibility. Fibrosis pneumonia or loculated fluid are also possibilities.  2.  Minimal blunting of costophrenic angles bilaterally.  - received ceftriaxone x1 and doxycycline in ED.   - Does not look infectious clinically however given low BP & elevated procalcitonin, there is concern for CAP and hence ceftriaxone & doxycycline has been restarted ( day 3 today )  - consider CT chest with contrast when her BLAYNE improves  - supportive IS, O2, RT        Hypotension   Assessment & Plan    On IV fluid and pressor ( levo & vasopressin ).   Unclear cause, probably combination of sepsis ( ? Lung source), dehydration, liver failure.   -Hold home BP meds [atenolol 50 mg, losartan 25 mg, spironolactone 25 mg]  -Cont Midodrine 5 mg TID, IV fluid and wean pressor as able.         Lactic acidosis- (present on admission)   Assessment & Plan    LA  4.4, improved to 1.6 with IV fluid.   Resolved.         Atrial fibrillation (CMS-HCC)- (present on admission)   Assessment & Plan    Chronic afib, on atenolol 50 mg, currently holding given her hypotension & apixaban at home.     - Cardiac monitoring   - holding apixaban due to liver failure, elevated INR and BLAYNE        Compression fracture of L1 lumbar vertebra (CMS-HCC)- (present on admission)   Assessment & Plan    Moderate anterior wedge compression fracture on X ray  Hx of fall many days ago, patient does not remember when  - Fall precaution   - can use lidocaine patch for pain if needed [avoid NSAIDs]        COPD (chronic obstructive pulmonary disease) (CMS-HCC)- (present on admission)   Assessment & Plan    Not on home oxygen  PFT Aug 2017 Ratio 61% FEV1 63%, DLCO WNL   - O2, RT, continue home spiriva, symbicort  - IS.

## 2018-04-24 NOTE — PROGRESS NOTES
Central line placed by Dr. Oliver, Dr. Ha present. Consent signed, timeout performed, procedure explained to patient. X-ray ordered to verify placement. CVP monitoring and Levophed initiated.

## 2018-04-24 NOTE — PROGRESS NOTES
Pulmonary Critical Care Progress Note        Admit:  4/22/2018    Chief Complaint:  Weakness/fatigue - low Bp/hepatic and renal failure    History of Present Illness:    This is a 77-year-old lady   with an extensive past medical history.  She comes in to the emergency room   complaining of weakness and fatigue.  She has been having problems with   generalized pruritus for quite some time, but it has become worse for the last   several days.  She has been getting dizzy when she stands for the last 3 days   and about 3 days ago her  noted that she was becoming jaundiced.  She   came in to the emergency department.  In the field, her blood pressure was in   60s systolic.  She is receiving intravenous crystalloid solution.     She admits to drinking 3 glasses of white wine a day, which she has done for   many years.  She occasionally will take acetaminophen for pain, but does not   abuse it.  She has no known history of liver disease.    Review of Systems   Constitutional: Negative for chills, diaphoresis, fever and malaise/fatigue.   HENT: Negative for congestion, nosebleeds, sinus pain and sore throat.    Eyes: Negative.    Respiratory: Positive for cough (better - nonproductive) and shortness of breath. Negative for hemoptysis and sputum production.    Cardiovascular: Negative for chest pain and palpitations.   Gastrointestinal: Negative for abdominal pain, blood in stool, diarrhea, nausea and vomiting.   Genitourinary: Negative.    Skin: Negative for rash.   Neurological: Positive for weakness. Negative for speech change, focal weakness and headaches.   Endo/Heme/Allergies: Bruises/bleeds easily.   Psychiatric/Behavioral: Negative for depression. The patient has insomnia.        Interval Events:  24 hour interval history reviewed     A&Ox4  Follows well  Poor appetite  Remains on norepinephrine drip for hypotension - actively titrated - up to 14  Cortisol 3.7 - QUAN?  IV HC started last night  Procalcitonin  elevated  Ceftriaxone/Doxy started yesterday  No sputum yet  Repeat BC sent - NGTD  CVP 4  Bp 90s  SR/ST - 90-110s  Tm 37.4  Ceftriaxone/Doxycycline  LFTs elevated - Bili worse  Renal function better  Na lower  GI eval ongoing  INR better    Serial f/u - vasopressin helped BP - NE weaned some     YESTERDAY  A&O x4  Bp running low  ARB 1.7 liters positive  AF - rate 80s  Still looks dry  Afebrile  Taking PO poorly  UO adequate    Bolus ongoing  Not dizzy  2 lpm NC -> Room air  CXR slt CM, hyperinflated, min atx/opacities - no change  Eliquis held  INR 6.03  K 5.5  BUN/creat better  LA 1.6  LFTs better  Bili 16.9  NH3 51  Mg 1.5  Plt slt better    Persisting low Bp despite repeated fluid boluses  Becoming symptomatic  Central line placed  CVP low  Fluids continued - NE drip started at 10  W/u re etiology of low Bp ongoing      PFSH:  No change.    General:  Appears acute and chronically ill, appears her stated age    Skin:  Warm and dry, no rash or lesion, some tinting of skin, 1+ edema    Respiratory:     Pulse Oximetry: 99 %  2 lpm NC O2          Exam: unlabored respirations, no intercostal retractions or accessory muscle use    Diminished BS at bases - few coarse rhonchi - R>L - no delta    ImagingCXR  I have personally reviewed the chest x-ray my impression is  noted above and films are reviewed with Team on rounds           Invalid input(s): [id number]    HemoDynamics:  Pulse: (!) 108, Heart Rate (Monitored): (!) 103  Blood Pressure : (!) 74/34, NIBP: (!) 90/55  CVP (mm Hg): (!) -3 MM HG    Exam: no ectopy, atrial fibrillation, irregularly irregular rhythm, no edema, no murmur - unchanged    Imaging: Available data reviewed  Recent Labs      04/22/18   1450  04/23/18   1740   TROPONINI  0.02  0.02       Neuro:  GCS Total Christy Coma Score: 15       Exam: no focal deficits noted mental status intact, weak diffusely - slt better  Imaging: Available data reviewed    Fluids:  Intake/Output       04/22/18  0700 - 04/23/18 0659 04/23/18 0700 - 04/24/18 0659 04/24/18 0700 - 04/25/18 0659      0700-1859 1900-0659 Total 0700-1859 1900-0659 Total 0700-1859 1900-0659 Total       Intake    P.O.  --  200 200  410  80 490  --  -- --    P.O. -- 200 200 410 80 490 -- -- --    I.V.  --  2000 2000  4420  1978.9 6398.9  --  -- --    Norepinephrine Volume -- -- -- -- 208.9 208.9 -- -- --    IV Piggyback Volume (NS Bolus ) -- -- -- 2186 942 9629 -- -- --    LR -- 2000 2000 1000 -- 1000 -- -- --    IV Volume (Normal Saline) -- -- -- 1500 1250 2750 -- -- --    IV Volume (Rally Bag ) -- -- -- 420 420 840 -- -- --    Total Intake -- 2200 2200 4830 2058.9 6888.9 -- -- --       Output    Urine  --  480 480  250  350 600  --  -- --    Number of Times Voided -- -- -- -- 0 x 0 x -- -- --    Void (ml) -- 480 480 250 350 600 -- -- --    Total Output -- 480 480 250 350 600 -- -- --       Net I/O     -- 1720 1720 4580 1708.9 6288.9 -- -- --           Recent Labs      04/22/18   1450  04/23/18   0436  04/24/18   0500   SODIUM  127*  130*  128*   POTASSIUM  5.1  5.5  4.7   CHLORIDE  99  105  105   CO2  16*  21  18*   BUN  48*  42*  36*   CREATININE  2.08*  1.89*  1.62*   MAGNESIUM  1.5   --    --    CALCIUM  9.3  8.0*  7.9*       GI/Nutrition:  Exam: obese, abdomen is soft and non-tender, normal active bowel sounds, no CVAT - no delta  Imaging: Available data reviewed  taking PO poorly still    Liver Function  Recent Labs      04/22/18   1450  04/23/18   0436  04/24/18   0500   ALTSGPT  365*  324*  329*   ASTSGOT  831*  661*  618*   ALKPHOSPHAT  73  63  81   TBILIRUBIN  19.2*  16.9*  20.8*   GLUCOSE  83  99  136*       Heme:  Recent Labs      04/22/18   1450  04/22/18   2331  04/23/18   0436  04/23/18   1740  04/24/18   0500   RBC  3.72*   --   3.38*  3.45*  3.53*   HEMOGLOBIN  12.7   --   11.6*  11.9*  12.2   HEMATOCRIT  36.6*   --   33.7*  34.1*  34.8*   PLATELETCT  134*   --   146*  136*  147*   PROTHROMBTM  53.7*   --    --    --   31.2*   APTT   53.0*   --    --    --   50.1*   INR  6.03*   --    --    --   3.04*   IRON   --    --    --    --   157   FERRITIN   --   1087.1*   --    --    --    TOTIRONBC   --    --    --    --   185*       Infectious Disease:  Temp  Av.2 °C (97.1 °F)  Min: 36 °C (96.8 °F)  Max: 36.3 °C (97.4 °F)  Micro: reviewed     Did receive C3/Doxy in ER   Procal 0.75    Recent Labs      18   1450  18   0436  18   1740  18   0500   WBC  9.1  7.5  7.9  7.3   NEUTSPOLYS  72.30*  71.20  70.50  86.60*   LYMPHOCYTES  7.40*  10.60*  7.50*  4.50*   MONOCYTES  16.30*  13.50*  16.40*  6.50   EOSINOPHILS  1.80  2.90  3.20  0.60   BASOPHILS  0.70  0.70  0.60  0.40   ASTSGOT  831*  661*   --   618*   ALTSGPT  365*  324*   --   329*   ALKPHOSPHAT  73  63   --   81   TBILIRUBIN  19.2*  16.9*   --   20.8*     Current Facility-Administered Medications   Medication Dose Frequency Provider Last Rate Last Dose   • SODIUM CHLORIDE 0.9 % IV SOLN           • traZODone (DESYREL) tablet 100 mg  100 mg QHS Jasper Milton M.D.   100 mg at 18 0155   • thiamine (THIAMINE) tablet 100 mg  100 mg DAILY Marie Panchal M.D.        And   • folic acid (FOLVITE) tablet 1 mg  1 mg DAILY Marie Panchal M.D.        And   • multivitamin (THERAGRAN) tablet 1 Tab  1 Tab DAILY Marie Panchal M.D.       • midodrine (PROAMATINE) tablet 5 mg  5 mg TID WITH MEALS Liborio Oliver M.D.   5 mg at 18 1639   • norepinephrine (LEVOPHED) 8 mg in  mL Infusion  0.5-30 mcg/min Continuous Salazar Ha M.D. 22.5 mL/hr at 18 0330 12 mcg/min at 18 0330   • cefTRIAXone (ROCEPHIN) syringe 1 g  1 g Q24HRS Liborio Oliver M.D.   1 g at 18 1700   • doxycycline (VIBRAMYCIN) 100 mg in  mL IVPB  100 mg Q12HRS Liborio Oliver M.D.   Stopped at 18 2249   • hydrocortisone sodium succinate PF (SOLU-CORTEF) 100 MG injection 50 mg  50 mg Q6HRS Marie Panchal M.D.   50 mg at  04/24/18 0453   • NS infusion   Continuous Marie Panchal M.D. 125 mL/hr at 04/24/18 0101     • Respiratory Care per Protocol   Continuous RT Marie Panchal M.D.       • budesonide-formoterol (SYMBICORT) 160-4.5 MCG/ACT inhaler 2 Puff  2 Puff BID Marie Panchal M.D.   2 Puff at 04/23/18 3029   • tiotropium (SPIRIVA) 18 MCG inhalation capsule 1 Cap  1 Cap DAILY Marie Panchal M.D.   1 Cap at 04/23/18 1227     Last reviewed on 4/22/2018  9:51 PM by Rachna Smith R.N.    Quality  Measures:   Labs reviewed, Radiology images reviewed and Medications reviewed                      Problems/plan:  Acute unspecified hypotension.  This is likely due to intravascular volume depletion.    Queary a component QUAN/ALD/Sepsis from CAP - indeterminant   Low Bp despite repeated Bolus -> NE started 4/24   Add Vasopressin drip 4/25   DDX - still dry, metabolic, Sepsis, cardiac, current Bp meds - poor clearance (BLAYNE/AHF) etc   Central line placed 4/24   Midodrine -> NE drip -> add vaso   CVP monitoring   F/u Hct/Procal/cultures/Cortisol -> empiric ABX C3/Doxy 7/5 day course planned   HC IV - wean off in 24-48 hrs when off pressors for 24 hrs   ECHO pending   IVF - may need ALB IV if hypotension persists   LA remains normal  Acute liver failure, etiology not entirely clear.  She does have a history of regular alcohol use and drinks 3 glasses of wine a day.  Of note, her iron studies on 03/09/2018 show elevated iron.   LABs slight better/worse   GI evaluating - notes reviewed   H/o Reg ETOH - etiology? Monitor for DTs - no so far   Avoid hepatotoxins   Multiple serologies pending - neg so far   Negative hepatitis panel.   Coagulopathy noted - follow - Blood products PRN - TEG/mapping PRN  Acute kidney injury.- function better again   Avoid renal toxins   Renally dose meds?   Serial I/Os-BUN/creat   Sanchez if pressors started   May need renal eval  Hyponatremia   Monitor  Intravascular volume  depletion.   IVF  Abnormal chest x-ray with increased opacification at the right heart border.   F/u CXR - CAP?   Empiric IV ABX   Sputum requested  Hyponatremia.   Monitor  Anemia   Monitor   Transfuse PRN  Moderate stage II COPD with an FEV1 of 1.11 liters, 63% of predicted.  There is no acute exacerbation.   RT protocols   Titrate O2   IS/mobil;ize when safe   Nebs as needed  History of systemic arterial hypertension.   Holding meds   Poor clearance with both hepatic and renal issues -> low Bp?  Atrial fibrillation.  She is anticoagulated with apixaban historically   Hold for now with elevated albs and renal/hepatic failure  H/o Cor pulmonale  Prophylaxis    Add vaso  Await cultures  ECHO  IVF  UO borderline  IS  GI considering Bx    Prognosis remains very guarded.  Critically ill with unstable hemodynamic status on IV pressors with MOSF.  Add Vasopressin today to facilitate weaning NE down a bit. High risk of deterioration and worsening vital organ dysfunction and death without the above critical care interventions.    Discussed patient condition and risk of morbidity and/or mortality with RN, RT, Pharmacy, [employer] [facility] resident, Charge nurse / hot rounds and Patient.    The patient remains critically ill.  Critical care time = 45 minutes in directly providing and coordinating critical care and extensive data review.  No time overlap and excludes procedures.

## 2018-04-24 NOTE — CARE PLAN
Problem: Safety  Goal: Will remain free from injury    Intervention: Provide assistance with mobility  Patient standby assist for mobilizing to commode. Patient has some weakness and dizziness with ambulation. Patient understands to call before getting up.       Problem: Fluid Volume:  Goal: Will maintain balanced intake and output    Intervention: Monitor, educate, and encourage compliance with therapeutic intake of liquids  Patient educated on edema and possible causes. Patient still has poor oral intake. MD aware of edema and fluids are to continue until CVP increases.

## 2018-04-24 NOTE — CARE PLAN
Problem: Safety  Goal: Will remain free from falls  Outcome: PROGRESSING AS EXPECTED    Intervention: Assess risk factors for falls  Risk factors assessed  Intervention: Implement fall precautions   04/23/18 2208   OTHER   Environmental Precautions Treaded Slipper Socks on Patient;Personal Belongings, Wastebasket, Call Bell etc. in Easy Reach;Bed in Low Position;Communication Sign for Patients & Families;Transferred to Stronger Side;Report Given to Other Health Care Providers Regarding Fall Risk;Mobility Assessed & Appropriate Sign Placed   IV Pole on Same Side of Bed as Bathroom Yes   Chair/Bed Strip Alarm Yes - Alarm On   Bed Alarm (Built in - for ICU ONLY) Yes - Alarm On         Problem: Infection  Goal: Will remain free from infection  Outcome: PROGRESSING AS EXPECTED    Intervention: Assess signs and symptoms of infection  S/S assessed  Intervention: Implement standard precautions and perform hand washing before and after patient contact  Hand washing performed   Intervention: Give CDC handouts for infection prevention (infection prevention/hand washing, disease specific, and device specific) and document in Education  Pt educated orally   Intervention: Assess for removal of potential routes of infection, such as IV, central line, intra-arterial or urinary catheters  Routes assessed

## 2018-04-25 LAB
A1AT SERPL-MCNC: 118 MG/DL (ref 90–200)
ALBUMIN SERPL BCP-MCNC: 1.7 G/DL (ref 3.2–4.9)
ALBUMIN/GLOB SERPL: 0.6 G/DL
ALP SERPL-CCNC: 60 U/L (ref 30–99)
ALT SERPL-CCNC: 221 U/L (ref 2–50)
AMMONIA PLAS-SCNC: 86 UMOL/L (ref 11–45)
ANION GAP SERPL CALC-SCNC: 6 MMOL/L (ref 0–11.9)
APTT PPP: 45.6 SEC (ref 24.7–36)
AST SERPL-CCNC: 325 U/L (ref 12–45)
BASOPHILS # BLD AUTO: 0.1 % (ref 0–1.8)
BASOPHILS # BLD: 0.01 K/UL (ref 0–0.12)
BILIRUB SERPL-MCNC: 19.2 MG/DL (ref 0.1–1.5)
BUN SERPL-MCNC: 35 MG/DL (ref 8–22)
CALCIUM SERPL-MCNC: 7.7 MG/DL (ref 8.5–10.5)
CHLORIDE SERPL-SCNC: 109 MMOL/L (ref 96–112)
CO2 SERPL-SCNC: 17 MMOL/L (ref 20–33)
COPPER SERPL-MCNC: 122 UG/DL (ref 80–155)
CREAT SERPL-MCNC: 1.42 MG/DL (ref 0.5–1.4)
EOSINOPHIL # BLD AUTO: 0 K/UL (ref 0–0.51)
EOSINOPHIL NFR BLD: 0 % (ref 0–6.9)
ERYTHROCYTE [DISTWIDTH] IN BLOOD BY AUTOMATED COUNT: 64.7 FL (ref 35.9–50)
GLOBULIN SER CALC-MCNC: 2.7 G/DL (ref 1.9–3.5)
GLUCOSE SERPL-MCNC: 165 MG/DL (ref 65–99)
HCT VFR BLD AUTO: 30.5 % (ref 37–47)
HGB BLD-MCNC: 10.5 G/DL (ref 12–16)
IMM GRANULOCYTES # BLD AUTO: 0.05 K/UL (ref 0–0.11)
IMM GRANULOCYTES NFR BLD AUTO: 0.7 % (ref 0–0.9)
INR PPP: 3.1 (ref 0.87–1.13)
LYMPHOCYTES # BLD AUTO: 0.29 K/UL (ref 1–4.8)
LYMPHOCYTES NFR BLD: 3.9 % (ref 22–41)
MCH RBC QN AUTO: 34.5 PG (ref 27–33)
MCHC RBC AUTO-ENTMCNC: 34.4 G/DL (ref 33.6–35)
MCV RBC AUTO: 100.3 FL (ref 81.4–97.8)
MITOCHONDRIA M2 IGG SER-ACNC: 9 UNITS (ref 0–20)
MONOCYTES # BLD AUTO: 0.48 K/UL (ref 0–0.85)
MONOCYTES NFR BLD AUTO: 6.4 % (ref 0–13.4)
NEUTROPHILS # BLD AUTO: 6.62 K/UL (ref 2–7.15)
NEUTROPHILS NFR BLD: 88.9 % (ref 44–72)
NRBC # BLD AUTO: 0 K/UL
NRBC BLD-RTO: 0 /100 WBC
PLATELET # BLD AUTO: 100 K/UL (ref 164–446)
PMV BLD AUTO: 11.2 FL (ref 9–12.9)
POTASSIUM SERPL-SCNC: 4.2 MMOL/L (ref 3.6–5.5)
PROT SERPL-MCNC: 4.4 G/DL (ref 6–8.2)
PROTHROMBIN TIME: 31.7 SEC (ref 12–14.6)
RBC # BLD AUTO: 3.04 M/UL (ref 4.2–5.4)
SODIUM SERPL-SCNC: 132 MMOL/L (ref 135–145)
WBC # BLD AUTO: 7.5 K/UL (ref 4.8–10.8)

## 2018-04-25 PROCEDURE — 700102 HCHG RX REV CODE 250 W/ 637 OVERRIDE(OP): Performed by: INTERNAL MEDICINE

## 2018-04-25 PROCEDURE — 700111 HCHG RX REV CODE 636 W/ 250 OVERRIDE (IP): Performed by: INTERNAL MEDICINE

## 2018-04-25 PROCEDURE — 700105 HCHG RX REV CODE 258: Performed by: INTERNAL MEDICINE

## 2018-04-25 PROCEDURE — 85025 COMPLETE CBC W/AUTO DIFF WBC: CPT

## 2018-04-25 PROCEDURE — 85730 THROMBOPLASTIN TIME PARTIAL: CPT

## 2018-04-25 PROCEDURE — A9270 NON-COVERED ITEM OR SERVICE: HCPCS | Performed by: STUDENT IN AN ORGANIZED HEALTH CARE EDUCATION/TRAINING PROGRAM

## 2018-04-25 PROCEDURE — A9270 NON-COVERED ITEM OR SERVICE: HCPCS | Performed by: HOSPITALIST

## 2018-04-25 PROCEDURE — 770022 HCHG ROOM/CARE - ICU (200)

## 2018-04-25 PROCEDURE — A9270 NON-COVERED ITEM OR SERVICE: HCPCS | Performed by: INTERNAL MEDICINE

## 2018-04-25 PROCEDURE — 82140 ASSAY OF AMMONIA: CPT

## 2018-04-25 PROCEDURE — 700111 HCHG RX REV CODE 636 W/ 250 OVERRIDE (IP): Mod: JG | Performed by: STUDENT IN AN ORGANIZED HEALTH CARE EDUCATION/TRAINING PROGRAM

## 2018-04-25 PROCEDURE — 700102 HCHG RX REV CODE 250 W/ 637 OVERRIDE(OP): Performed by: STUDENT IN AN ORGANIZED HEALTH CARE EDUCATION/TRAINING PROGRAM

## 2018-04-25 PROCEDURE — 700102 HCHG RX REV CODE 250 W/ 637 OVERRIDE(OP): Performed by: HOSPITALIST

## 2018-04-25 PROCEDURE — P9045 ALBUMIN (HUMAN), 5%, 250 ML: HCPCS | Mod: JG | Performed by: STUDENT IN AN ORGANIZED HEALTH CARE EDUCATION/TRAINING PROGRAM

## 2018-04-25 PROCEDURE — 700111 HCHG RX REV CODE 636 W/ 250 OVERRIDE (IP): Performed by: HOSPITALIST

## 2018-04-25 PROCEDURE — 80053 COMPREHEN METABOLIC PANEL: CPT

## 2018-04-25 PROCEDURE — 85610 PROTHROMBIN TIME: CPT

## 2018-04-25 RX ORDER — DOXYCYCLINE 100 MG/1
100 TABLET ORAL EVERY 12 HOURS
Status: DISPENSED | OUTPATIENT
Start: 2018-04-25 | End: 2018-04-27

## 2018-04-25 RX ORDER — MIDODRINE HYDROCHLORIDE 5 MG/1
10 TABLET ORAL
Status: DISCONTINUED | OUTPATIENT
Start: 2018-04-25 | End: 2018-04-30

## 2018-04-25 RX ORDER — ALBUMIN, HUMAN INJ 5% 5 %
12.5 SOLUTION INTRAVENOUS ONCE
Status: COMPLETED | OUTPATIENT
Start: 2018-04-25 | End: 2018-04-25

## 2018-04-25 RX ORDER — LACTULOSE 20 G/30ML
30 SOLUTION ORAL 3 TIMES DAILY
Status: DISCONTINUED | OUTPATIENT
Start: 2018-04-25 | End: 2018-05-01 | Stop reason: HOSPADM

## 2018-04-25 RX ADMIN — HYDROCORTISONE SODIUM SUCCINATE 50 MG: 100 INJECTION, POWDER, FOR SOLUTION INTRAMUSCULAR; INTRAVENOUS at 12:28

## 2018-04-25 RX ADMIN — LACTULOSE 30 ML: 20 SOLUTION ORAL at 20:31

## 2018-04-25 RX ADMIN — TRAZODONE HYDROCHLORIDE 100 MG: 100 TABLET ORAL at 20:31

## 2018-04-25 RX ADMIN — CEFTRIAXONE SODIUM 1 G: 10 INJECTION, POWDER, FOR SOLUTION INTRAVENOUS at 09:41

## 2018-04-25 RX ADMIN — MIDODRINE HYDROCHLORIDE 5 MG: 5 TABLET ORAL at 12:27

## 2018-04-25 RX ADMIN — HYDROCORTISONE SODIUM SUCCINATE 50 MG: 100 INJECTION, POWDER, FOR SOLUTION INTRAMUSCULAR; INTRAVENOUS at 18:23

## 2018-04-25 RX ADMIN — HYDROCORTISONE SODIUM SUCCINATE 50 MG: 100 INJECTION, POWDER, FOR SOLUTION INTRAMUSCULAR; INTRAVENOUS at 05:06

## 2018-04-25 RX ADMIN — MIDODRINE HYDROCHLORIDE 10 MG: 5 TABLET ORAL at 18:23

## 2018-04-25 RX ADMIN — DOXYCYCLINE 100 MG: 100 TABLET ORAL at 09:41

## 2018-04-25 RX ADMIN — THERA TABS 1 TABLET: TAB at 09:41

## 2018-04-25 RX ADMIN — Medication 100 MG: at 09:41

## 2018-04-25 RX ADMIN — TIOTROPIUM BROMIDE 1 CAPSULE: 18 CAPSULE ORAL; RESPIRATORY (INHALATION) at 09:28

## 2018-04-25 RX ADMIN — FOLIC ACID 1 MG: 1 TABLET ORAL at 09:41

## 2018-04-25 RX ADMIN — MIDODRINE HYDROCHLORIDE 5 MG: 5 TABLET ORAL at 06:30

## 2018-04-25 RX ADMIN — LACTULOSE 30 ML: 20 SOLUTION ORAL at 09:41

## 2018-04-25 RX ADMIN — DOXYCYCLINE 100 MG: 100 TABLET ORAL at 20:32

## 2018-04-25 RX ADMIN — ALBUMIN (HUMAN) 12.5 G: 12.5 INJECTION, SOLUTION INTRAVENOUS at 10:33

## 2018-04-25 RX ADMIN — BUDESONIDE AND FORMOTEROL FUMARATE DIHYDRATE 2 PUFF: 160; 4.5 AEROSOL RESPIRATORY (INHALATION) at 09:28

## 2018-04-25 RX ADMIN — BUDESONIDE AND FORMOTEROL FUMARATE DIHYDRATE 2 PUFF: 160; 4.5 AEROSOL RESPIRATORY (INHALATION) at 20:32

## 2018-04-25 RX ADMIN — LACTULOSE 30 ML: 20 SOLUTION ORAL at 15:44

## 2018-04-25 RX ADMIN — VASOPRESSIN 0.03 UNITS/MIN: 20 INJECTION INTRAVENOUS at 18:23

## 2018-04-25 ASSESSMENT — ENCOUNTER SYMPTOMS
BRUISES/BLEEDS EASILY: 1
SHORTNESS OF BREATH: 1
WEAKNESS: 1
CHILLS: 1
FALLS: 0
HEADACHES: 0
ABDOMINAL PAIN: 0
TINGLING: 0
SPEECH CHANGE: 0
WHEEZING: 0
INSOMNIA: 1
DEPRESSION: 0
EYE DISCHARGE: 0
COUGH: 0
FOCAL WEAKNESS: 0
FEVER: 0
HEMOPTYSIS: 0
MYALGIAS: 0
BACK PAIN: 0
SPUTUM PRODUCTION: 0
DIARRHEA: 0
POLYDIPSIA: 0
BLOOD IN STOOL: 0
VOMITING: 0
BRUISES/BLEEDS EASILY: 0
CHILLS: 0
BLURRED VISION: 0
HALLUCINATIONS: 0
DIAPHORESIS: 0
SORE THROAT: 0
STRIDOR: 0
PALPITATIONS: 0
COUGH: 1
DIZZINESS: 1
NAUSEA: 0
NERVOUS/ANXIOUS: 0

## 2018-04-25 ASSESSMENT — PAIN SCALES - GENERAL
PAINLEVEL_OUTOF10: 0

## 2018-04-25 ASSESSMENT — PATIENT HEALTH QUESTIONNAIRE - PHQ9
1. LITTLE INTEREST OR PLEASURE IN DOING THINGS: NOT AT ALL
SUM OF ALL RESPONSES TO PHQ9 QUESTIONS 1 AND 2: 0
2. FEELING DOWN, DEPRESSED, IRRITABLE, OR HOPELESS: NOT AT ALL

## 2018-04-25 NOTE — CARE PLAN
Problem: Safety  Goal: Will remain free from injury  Outcome: PROGRESSING AS EXPECTED    Intervention: Provide assistance with mobility  Pt assisted with ambulation by staff. Pt encouraged to participate in activity. Activity progressed at pt's own rate. Pt given rest periods as needed.       Problem: Respiratory:  Goal: Respiratory status will improve  Outcome: PROGRESSING AS EXPECTED    Intervention: Assess and monitor pulmonary status  Pt's respiratory status assessed. continuous pulse oximetry in use. Head of bed elevated. Collaboration with RT. Ambu bag at bedside. Pt encouraged to use incentive spirometer. Pt encouraged to self suction as needed. Supplemental oxygen in use

## 2018-04-25 NOTE — PROGRESS NOTES
Gastroenterology Progress Note     Author: Jony Spearhzadeh   Date & Time Created: 4/25/2018 11:21 AM    Chief Complaint:  Jaundice    Interval History:  No new c/o.    Review of Systems:  Review of Systems   Constitutional: Negative for chills and fever.   Gastrointestinal: Negative for blood in stool, melena, nausea and vomiting.       Physical Exam:  Physical Exam   Constitutional: She is oriented to person, place, and time. She appears distressed.   Eyes: Scleral icterus is present.   Cardiovascular: Normal rate and regular rhythm.    Pulmonary/Chest: Effort normal and breath sounds normal.   Abdominal: Soft. Bowel sounds are normal. She exhibits no distension. There is no tenderness. There is no rebound.   Musculoskeletal: She exhibits edema.   Neurological: She is alert and oriented to person, place, and time.   Skin: Skin is warm and dry.   Nursing note and vitals reviewed.      Labs:        Invalid input(s): [id number]  Recent Labs      04/22/18   1450  04/23/18   1740  04/24/18   0500   TROPONINI  0.02  0.02   --    BNPBTYPENAT   --    --   180*     Recent Labs      04/22/18   1450  04/23/18   0436  04/24/18   0500  04/25/18   0517   SODIUM  127*  130*  128*  132*   POTASSIUM  5.1  5.5  4.7  4.2   CHLORIDE  99  105  105  109   CO2  16*  21  18*  17*   BUN  48*  42*  36*  35*   CREATININE  2.08*  1.89*  1.62*  1.42*   MAGNESIUM  1.5   --    --    --    CALCIUM  9.3  8.0*  7.9*  7.7*     Recent Labs      04/23/18   0436  04/24/18   0500  04/25/18   0517   ALTSGPT  324*  329*  221*   ASTSGOT  661*  618*  325*   ALKPHOSPHAT  63  81  60   TBILIRUBIN  16.9*  20.8*  19.2*   GLUCOSE  99  136*  165*     Recent Labs      04/22/18   1450  04/22/18   2331   04/23/18   1740  04/24/18   0500  04/25/18   0517   RBC  3.72*   --    < >  3.45*  3.53*  3.04*   HEMOGLOBIN  12.7   --    < >  11.9*  12.2  10.5*   HEMATOCRIT  36.6*   --    < >  34.1*  34.8*  30.5*   PLATELETCT  134*   --    < >  136*  147*  100*    PROTHROMBTM  53.7*   --    --    --   31.2*  31.7*   APTT  53.0*   --    --    --   50.1*  45.6*   INR  6.03*   --    --    --   3.04*  3.10*   IRON   --    --    --    --   157   --    FERRITIN   --   1087.1*   --    --    --    --    TOTIRONBC   --    --    --    --   185*   --     < > = values in this interval not displayed.     Recent Labs      18   0436  18   1740  18   0500  18   0517   WBC  7.5  7.9  7.3  7.5   NEUTSPOLYS  71.20  70.50  86.60*  88.90*   LYMPHOCYTES  10.60*  7.50*  4.50*  3.90*   MONOCYTES  13.50*  16.40*  6.50  6.40   EOSINOPHILS  2.90  3.20  0.60  0.00   BASOPHILS  0.70  0.60  0.40  0.10   ASTSGOT  661*   --   618*  325*   ALTSGPT  324*   --   329*  221*   ALKPHOSPHAT  63   --   81  60   TBILIRUBIN  16.9*   --   20.8*  19.2*     Hemodynamics:  Temp (24hrs), Av.2 °C (97.2 °F), Min:36 °C (96.8 °F), Max:36.6 °C (97.8 °F)  Temperature: 36.6 °C (97.8 °F)  Pulse  Av.4  Min: 42  Max: 122Heart Rate (Monitored): 93  NIBP: 102/64  CVP (mm Hg): (!) 9 MM HG  Respiratory:    Respiration: 16, Pulse Oximetry: 97 %, O2 Daily Delivery Respiratory : Silicone Nasal Cannula     #MDI/DPI Given: MDI/DPI x 2  RUL Breath Sounds: Clear;Diminished, RML Breath Sounds: Diminished, RLL Breath Sounds: Diminished, ERICK Breath Sounds: Clear;Diminished, LLL Breath Sounds: Diminished  Fluids:    Intake/Output Summary (Last 24 hours) at 18 1329  Last data filed at 18 1200   Gross per 24 hour   Intake          5904.62 ml   Output              825 ml   Net          5079.62 ml     Weight: 72 kg (158 lb 11.7 oz)  GI/Nutrition:  Orders Placed This Encounter   Procedures   • DIET ORDER     Standing Status:   Standing     Number of Occurrences:   1     Order Specific Question:   Diet:     Answer:   Renal [8]     Order Specific Question:   Diet:     Answer:   Hepatic [9]     Medical Decision Making, by Problem:  Active Hospital Problems    Diagnosis   • *Hepatic failure: Appears to have  an acute hepatitis on underlying chronic liver disease. Suspect component chronic EtOH liver disease with possible overlapping AIH.   • Acute kidney injury: Improving.   • Atrial fibrillation   • COPD (chronic obstructive pulmonary disease)   • Anemia   • Coagulopathy        Plan:  Supportive care.  Await additional serology to determine cause of liver disease.  Transjugular liver biopsy.    Quality-Core Measures   Reviewed items::  Radiology images reviewed, Labs reviewed and Medications reviewed

## 2018-04-25 NOTE — PROGRESS NOTES
Pulmonary Critical Care Progress Note        Admit:  4/22/2018    Chief Complaint:  Weakness/fatigue - low Bp/hepatic and renal failure    History of Present Illness:    This is a 77-year-old lady   with an extensive past medical history.  She comes in to the emergency room   complaining of weakness and fatigue.  She has been having problems with   generalized pruritus for quite some time, but it has become worse for the last   several days.  She has been getting dizzy when she stands for the last 3 days   and about 3 days ago her  noted that she was becoming jaundiced.  She   came in to the emergency department.  In the field, her blood pressure was in   60s systolic.  She is receiving intravenous crystalloid solution.     She admits to drinking 3 glasses of white wine a day, which she has done for   many years.  She occasionally will take acetaminophen for pain, but does not   abuse it.  She has no known history of liver disease.    Review of Systems   Constitutional: Positive for chills and malaise/fatigue. Negative for diaphoresis and fever.   HENT: Negative for nosebleeds and sore throat.    Eyes: Negative for discharge.   Respiratory: Positive for cough (better - nonproductive) and shortness of breath. Negative for hemoptysis, sputum production, wheezing and stridor.    Cardiovascular: Positive for leg swelling. Negative for chest pain and palpitations.   Gastrointestinal: Negative for abdominal pain, blood in stool, diarrhea, melena, nausea and vomiting.   Genitourinary: Negative for dysuria and frequency.   Musculoskeletal: Negative for back pain and myalgias.   Skin:        Positive jaundice   Neurological: Positive for weakness. Negative for tingling, speech change, focal weakness and headaches.   Endo/Heme/Allergies: Negative for polydipsia. Bruises/bleeds easily.   Psychiatric/Behavioral: Negative for depression. The patient has insomnia. The patient is not nervous/anxious.        Interval  Events:  24 hour interval history reviewed    - AAOx4   - A-fib mostly controlled   - NE @ 0.5 --> 2, vasopressin   - edematous   - CVP 2-4   - low plts 100 down from 147   - Na up to 132   - improving BLAYNE   - improving LFTs and bilirubin, NH3 86   - EF 70%   - doxy and C3 x 5 days    - albumin boluses      PFSH:  No change.    Respiratory:   Room air --> 2 lpm n/c  Pulse Oximetry: 95 %          Exam: unlabored respirations, no intercostal retractions or accessory muscle use, rhonchi bibasilar and diminished breath sounds mild     ImagingCXR  I have personally reviewed the chest x-ray my impression is  (compared to prior film) none today           Invalid input(s): [id number]    HemoDynamics:  Pulse: 100, Heart Rate (Monitored): 94  NIBP: 105/59  CVP (mm Hg): 4 MM HG norepinephrine infusion at 2 µg, vasopressin at 0.03, CVP 2-4    Exam: rate controlled, 1+ lower extremity edema, atrial fibrillation, irregularly irregular rhythm, murmur    Imaging: Available data reviewed    Echocardiogram 4/24: CONCLUSIONS  No prior study is available for comparison.   Left ventricular ejection fraction is visually estimated to be 70%.  Severely dilated left atrium.  Enlarged right atrium.  Estimated right ventricular systolic pressure  is 35 mmHg.  Recent Labs      04/22/18   1450  04/23/18   1740  04/24/18   0500   TROPONINI  0.02  0.02   --    BNPBTYPENAT   --    --   180*       Neuro:  GCS Total Christy Coma Score: 15       Exam: generalized weakness, slow to answer questions no focal deficits noted mental status intact oriented for age x3  Imaging: Available data reviewed    Fluids:  Intake/Output       04/23/18 0700 - 04/24/18 0659 04/24/18 0700 - 04/25/18 0659 04/25/18 0700 - 04/26/18 0659      2893-7335 6810-9692 Total 1281-6250 0191-1775 Total 9688-4926 2997-0135 Total       Intake    P.O.  410  80 490  550  60 610  --  -- --    P.O. 410 80 490 550 60 610 -- -- --    I.V.  4420  2357.9 6777.9  1765.8  637.3 2403  --  --  --    Vasopressin Volume -- -- -- 81 108 189 -- -- --    Norepinephrine Volume -- 253.9 253.9 176.8 29.3 206 -- -- --    IV Piggyback Volume (NS Bolus ) 9157 867 5262 -- -- -- -- -- --    LR 1000 -- 1000 -- -- -- -- -- --    IV Volume (Normal Saline) 1500 1500 3000 3768 528 9027 -- -- --    IV Volume (Rally Bag ) 420 504 924 8 -- 8 -- -- --    Total Intake 4830 2437.9 7267.9 2315.8 697.3 3013 -- -- --       Output    Urine  250  425 675  575  450 1025  --  -- --    Number of Times Voided -- 0 x 0 x -- -- -- -- -- --    Urine Void (mL) (non-catheter) 250 425 675  -- -- --    Stool  --  -- --  --  -- --  --  -- --    Number of Times Stooled -- -- -- 4 x -- 4 x -- -- --    Total Output 250 425 675  -- -- --       Net I/O     4580 2012.9 6592.9 1740.8 247.3 1988 -- -- --        Weight: 72 kg (158 lb 11.7 oz)  Recent Labs      04/22/18   1450  04/23/18   0436  04/24/18   0500  04/25/18   0517   SODIUM  127*  130*  128*  132*   POTASSIUM  5.1  5.5  4.7  4.2   CHLORIDE  99  105  105  109   CO2  16*  21  18*  17*   BUN  48*  42*  36*  35*   CREATININE  2.08*  1.89*  1.62*  1.42*   MAGNESIUM  1.5   --    --    --    CALCIUM  9.3  8.0*  7.9*  7.7*       GI/Nutrition:  Exam: distended with fluid shift, abdomen is soft and non-tender, normal active bowel sounds  Imaging: Available data reviewed    Abdominal ultrasound 4/22:  1.  Lobular liver surface and heterogeneous liver echogenicity are consistent with cirrhosis.    2.  Post cholecystectomy.    3.  No biliary ductal dilatation appreciated.  taking PO     Liver Function  Recent Labs      04/23/18   0436  04/24/18   0500  04/25/18   0517   ALTSGPT  324*  329*  221*   ASTSGOT  661*  618*  325*   ALKPHOSPHAT  63  81  60   TBILIRUBIN  16.9*  20.8*  19.2*   GLUCOSE  99  136*  165*       Heme:  Recent Labs      04/22/18   1450  04/22/18   2331   04/23/18   1740  04/24/18   0500  04/25/18   0517   RBC  3.72*   --    < >  3.45*  3.53*  3.04*   HEMOGLOBIN   12.7   --    < >  11.9*  12.2  10.5*   HEMATOCRIT  36.6*   --    < >  34.1*  34.8*  30.5*   PLATELETCT  134*   --    < >  136*  147*  100*   PROTHROMBTM  53.7*   --    --    --   31.2*  31.7*   APTT  53.0*   --    --    --   50.1*  45.6*   INR  6.03*   --    --    --   3.04*  3.10*   IRON   --    --    --    --   157   --    FERRITIN   --   1087.1*   --    --    --    --    TOTIRONBC   --    --    --    --   185*   --     < > = values in this interval not displayed.       Infectious Disease:  Temp  Av.1 °C (97 °F)  Min: 36 °C (96.8 °F)  Max: 36.2 °C (97.2 °F)  Micro: reviewed negative    Recent Labs      18   0436  18   1740  18   0500  18   0517   WBC  7.5  7.9  7.3  7.5   NEUTSPOLYS  71.20  70.50  86.60*  88.90*   LYMPHOCYTES  10.60*  7.50*  4.50*  3.90*   MONOCYTES  13.50*  16.40*  6.50  6.40   EOSINOPHILS  2.90  3.20  0.60  0.00   BASOPHILS  0.70  0.60  0.40  0.10   ASTSGOT  661*   --   618*  325*   ALTSGPT  324*   --   329*  221*   ALKPHOSPHAT  63   --   81  60   TBILIRUBIN  16.9*   --   20.8*  19.2*     Current Facility-Administered Medications   Medication Dose Frequency Provider Last Rate Last Dose   • doxycycline monohydrate (ADOXA) tablet 100 mg  100 mg Q12HRS Jasper Milton M.D.   100 mg at 18 191   • vasopressin (VASOSTRICT) 20 Units in  mL Infusion  0.03 Units/min Continuous Salazar Ha M.D. 9 mL/hr at 18 0750 0.03 Units/min at 18 0750   • traZODone (DESYREL) tablet 100 mg  100 mg QHS Jasper Milton M.D.   100 mg at 18   • thiamine (THIAMINE) tablet 100 mg  100 mg DAILY Marie Panchal M.D.   100 mg at 18    And   • folic acid (FOLVITE) tablet 1 mg  1 mg DAILY Marie Panchal M.D.   1 mg at 18    And   • multivitamin (THERAGRAN) tablet 1 Tab  1 Tab DAILY Marie Panchal M.D.   1 Tab at 18   • midodrine (PROAMATINE) tablet 5 mg  5 mg TID WITH MEALS Asem A  RASHI Oliver   5 mg at 04/25/18 0630   • norepinephrine (LEVOPHED) 8 mg in  mL Infusion  0.5-30 mcg/min Continuous Salazar Ha M.D. 3.8 mL/hr at 04/25/18 0805 2 mcg/min at 04/25/18 0805   • cefTRIAXone (ROCEPHIN) syringe 1 g  1 g Q24HRS Liborio Oliver M.D.   1 g at 04/24/18 0824   • hydrocortisone sodium succinate PF (SOLU-CORTEF) 100 MG injection 50 mg  50 mg Q6HRS Marie Panchal M.D.   50 mg at 04/25/18 0506   • Respiratory Care per Protocol   Continuous RT Marie Panchal M.D.       • budesonide-formoterol (SYMBICORT) 160-4.5 MCG/ACT inhaler 2 Puff  2 Puff BID Marie Panchal M.D.   2 Puff at 04/24/18 1916   • tiotropium (SPIRIVA) 18 MCG inhalation capsule 1 Cap  1 Cap DAILY Marie Panchal M.D.   1 Cap at 04/24/18 0824     Last reviewed on 4/22/2018  9:51 PM by Rachna Smith R.N.    Quality  Measures:  Labs reviewed, Radiology images reviewed and Medications reviewed  Sanchez catheter: Critically Ill - Requiring Accurate Measurement of Urinary Output  Central line in place: Need for access    DVT Prophylaxis: Contraindicated - High bleeding risk  DVT prophylaxis - mechanical: SCDs  Ulcer prophylaxis: Yes  Antibiotics: Treating active infection/contamination beyond 24 hours perioperative coverage  Assessed for rehab: Patient unable to tolerate rehabilitation therapeutic regimen      Problems/plan:  Undifferentiated shock, likely combined vasodistributive from sepsis plus liver disease, ?QUAN   - Continue to wean norepinephrine and vasopressin drips to maintain MAP greater than 60   - Increase midodrine   - albumin infusions given low CVP and intravascular volume depletion   - Antibiotics/sepsis protocol, follow up on cultures   - Continue stress dose steroids   Acute liver failure, etiology not entirely clear - ETOH vs autoimmune   - GI following, possible liver biopsy, follow-up on serologies  Acute kidney injury - improving   - Avoid nephrotoxins, monitor urine  output, maintain perfusion  Hyponatremia - Monitor  Intravascular volume depletion   - Colloids today, trend CVP  Abnormal chest x-ray concerning for pneumonia   - Continue antibiotics, follow up on cultures  Anemia   - Monitor   - Conservative transfusion strategy  Moderate stage II COPD with an FEV1 of 1.11 liters, 63% of predicted.  There is no acute exacerbation   - O2/RT protocols, bronchodilators as needed  History of systemic arterial hypertension - currently hypotensive   - Continue holding medications  Chronic Atrial fibrillation - Rate controlled   - Rate control, hold on anticoagulants for now  Cor pulmonale  Prophylaxis, diet    Patient remains critically ill today requiring my active management of her persistent shock, fluid status, arrhythmia.    Discussed patient condition and risk of morbidity and/or mortality with RN, RT, Pharmacy, [employer] resident, Charge nurse / hot rounds, Patient and GI.    The patient remains critically ill.  Critical care time = 35 minutes in directly providing and coordinating critical care and extensive data review.  No time overlap and excludes procedures.

## 2018-04-25 NOTE — DIETARY
"Nutrition services: Day 3 of admit.  Rox Valdez is a 77 y.o. female with admitting DX of Dizziness, Itching, Jaundice, Hypotension  --Consult received for Supplements on nutrition admit screen + noted poor PO intake    Current Problem List:  1. Acute Liver Failure  2. Acute Kidney Injury  3. COPD    Assessment:  Height: 157.5 cm (5' 2.01\")  Weight: 72 kg (158 lb) via bed scale, though note pt is +10L since admit of fluids per I/O's, so pt's weight is not likely an accurate representation of her dry weight.   Body mass index is 29.02 kg/m². (overweight classification, based off weight with +10L fluids)  Diet/Intake: Hepatic; Renal, PO 0-50% of meals + 25-50% of Boost Plus 1x/day    Evaluation:   1. Per RD interview with pt, pt stated she drinks Boost Plus 1x/day at home.   2. Na: 132, Glucose: 165, BUN: 35, Cr: 1.42, Albumin: 1.7  3. Noted per MAR, pt is on solu-cortef which is likely contributing to elevated glucose levels.   4. Pertinent Meds: Thiamine, Folvite, Theragran  5. Pressors: Levophed @ 1mcg/min and Vasopressin @ 0.03 units/min    Recommendations/Plan:  1. Increased/Changed supplements to Boost Glucose Control TID per pt with poor PO intake, and glucose controlled more appropriate on renal diet.   2. Encourage intake of meals and supplements  3. Document intake of all meals as % taken in ADL's to provide interdisciplinary communication across all shifts.   4. Monitor weight.  5. Nutrition rep will continue to see patient for ongoing meal and snack preferences.             "

## 2018-04-25 NOTE — PROGRESS NOTES
Pt arrived to room S-134 from T-613. Pt transported via bed with transport monitor in use. Continuous medications verified. Personal belongings, chart and medications transported with pt.

## 2018-04-25 NOTE — CARE PLAN
Problem: Bowel/Gastric:  Goal: Normal bowel function is maintained or improved  Outcome: PROGRESSING AS EXPECTED      Problem: Knowledge Deficit  Goal: Knowledge of disease process/condition, treatment plan, diagnostic tests, and medications will improve  Outcome: PROGRESSING AS EXPECTED      Problem: Discharge Barriers/Planning  Goal: Patient's continuum of care needs will be met  Outcome: PROGRESSING AS EXPECTED

## 2018-04-26 ENCOUNTER — APPOINTMENT (OUTPATIENT)
Dept: RADIOLOGY | Facility: MEDICAL CENTER | Age: 78
DRG: 441 | End: 2018-04-26
Attending: INTERNAL MEDICINE
Payer: MEDICARE

## 2018-04-26 LAB
ALBUMIN SERPL BCP-MCNC: 2.1 G/DL (ref 3.2–4.9)
ALBUMIN/GLOB SERPL: 0.8 G/DL
ALP SERPL-CCNC: 69 U/L (ref 30–99)
ALT SERPL-CCNC: 207 U/L (ref 2–50)
AMMONIA PLAS-SCNC: 63 UMOL/L (ref 11–45)
ANION GAP SERPL CALC-SCNC: 5 MMOL/L (ref 0–11.9)
AST SERPL-CCNC: 217 U/L (ref 12–45)
BASOPHILS # BLD AUTO: 0.1 % (ref 0–1.8)
BASOPHILS # BLD: 0.01 K/UL (ref 0–0.12)
BILIRUB SERPL-MCNC: 21.3 MG/DL (ref 0.1–1.5)
BUN SERPL-MCNC: 39 MG/DL (ref 8–22)
CALCIUM SERPL-MCNC: 8.7 MG/DL (ref 8.5–10.5)
CFT BLD TEG: 4.6 MIN (ref 5–10)
CFT P HPASE BLD TEG: 4.4 MIN (ref 5–10)
CHLORIDE SERPL-SCNC: 111 MMOL/L (ref 96–112)
CLOT ANGLE BLD TEG: 68 DEGREES (ref 53–72)
CLOT ANGLE P HPASE BLD TEG: 69.6 DEGREES (ref 53–72)
CLOT INIT P HPASE BLD TEG: 1.5 MIN (ref 1–3)
CLOT LYSIS 30M P MA LENFR BLD TEG: 0 % (ref 0–8)
CLOT LYSIS 30M P MA LENFR BLD TEG: 4.3 % (ref 0–8)
CO2 SERPL-SCNC: 20 MMOL/L (ref 20–33)
CREAT SERPL-MCNC: 1.45 MG/DL (ref 0.5–1.4)
CT.EXTRINSIC BLD ROTEM: 1.7 MIN (ref 1–3)
EOSINOPHIL # BLD AUTO: 0 K/UL (ref 0–0.51)
EOSINOPHIL NFR BLD: 0 % (ref 0–6.9)
ERYTHROCYTE [DISTWIDTH] IN BLOOD BY AUTOMATED COUNT: 64.5 FL (ref 35.9–50)
GLOBULIN SER CALC-MCNC: 2.7 G/DL (ref 1.9–3.5)
GLUCOSE SERPL-MCNC: 136 MG/DL (ref 65–99)
HCT VFR BLD AUTO: 29.8 % (ref 37–47)
HCV RNA SERPL NAA+PROBE-ACNC: <15 IU/ML
HCV RNA SERPL NAA+PROBE-LOG IU: <1.2 LOG IU
HCV RNA SERPL QL NAA+PROBE: NOT DETECTED
HGB BLD-MCNC: 10.4 G/DL (ref 12–16)
IMM GRANULOCYTES # BLD AUTO: 0.04 K/UL (ref 0–0.11)
IMM GRANULOCYTES NFR BLD AUTO: 0.5 % (ref 0–0.9)
LYMPHOCYTES # BLD AUTO: 0.24 K/UL (ref 1–4.8)
LYMPHOCYTES NFR BLD: 3.2 % (ref 22–41)
MCF BLD TEG: 51.6 MM (ref 50–70)
MCF P HPASE BLD TEG: 51 MM (ref 50–70)
MCH RBC QN AUTO: 34.8 PG (ref 27–33)
MCHC RBC AUTO-ENTMCNC: 34.9 G/DL (ref 33.6–35)
MCV RBC AUTO: 99.7 FL (ref 81.4–97.8)
MONOCYTES # BLD AUTO: 0.55 K/UL (ref 0–0.85)
MONOCYTES NFR BLD AUTO: 7.3 % (ref 0–13.4)
NEUTROPHILS # BLD AUTO: 6.68 K/UL (ref 2–7.15)
NEUTROPHILS NFR BLD: 88.9 % (ref 44–72)
NRBC # BLD AUTO: 0 K/UL
NRBC BLD-RTO: 0 /100 WBC
NUCLEAR IGG SER QL IA: DETECTED
NUCLEAR IGG TITR SER IF: ABNORMAL {TITER}
PATHOLOGY STUDY: NORMAL
PLATELET # BLD AUTO: 89 K/UL (ref 164–446)
PMV BLD AUTO: 11 FL (ref 9–12.9)
POTASSIUM SERPL-SCNC: 3.9 MMOL/L (ref 3.6–5.5)
PROT SERPL-MCNC: 4.8 G/DL (ref 6–8.2)
RBC # BLD AUTO: 2.99 M/UL (ref 4.2–5.4)
SODIUM SERPL-SCNC: 136 MMOL/L (ref 135–145)
TEG ALGORITHM TGALG: ABNORMAL
WBC # BLD AUTO: 7.5 K/UL (ref 4.8–10.8)

## 2018-04-26 PROCEDURE — 85347 COAGULATION TIME ACTIVATED: CPT | Mod: 91

## 2018-04-26 PROCEDURE — 82140 ASSAY OF AMMONIA: CPT

## 2018-04-26 PROCEDURE — A9270 NON-COVERED ITEM OR SERVICE: HCPCS | Performed by: INTERNAL MEDICINE

## 2018-04-26 PROCEDURE — 0FB03ZX EXCISION OF LIVER, PERCUTANEOUS APPROACH, DIAGNOSTIC: ICD-10-PCS | Performed by: RADIOLOGY

## 2018-04-26 PROCEDURE — 700105 HCHG RX REV CODE 258: Performed by: INTERNAL MEDICINE

## 2018-04-26 PROCEDURE — 85576 BLOOD PLATELET AGGREGATION: CPT

## 2018-04-26 PROCEDURE — 700101 HCHG RX REV CODE 250

## 2018-04-26 PROCEDURE — 85025 COMPLETE CBC W/AUTO DIFF WBC: CPT

## 2018-04-26 PROCEDURE — 88313 SPECIAL STAINS GROUP 2: CPT | Mod: 91

## 2018-04-26 PROCEDURE — 700102 HCHG RX REV CODE 250 W/ 637 OVERRIDE(OP): Performed by: INTERNAL MEDICINE

## 2018-04-26 PROCEDURE — 85384 FIBRINOGEN ACTIVITY: CPT

## 2018-04-26 PROCEDURE — 80053 COMPREHEN METABOLIC PANEL: CPT

## 2018-04-26 PROCEDURE — 88307 TISSUE EXAM BY PATHOLOGIST: CPT

## 2018-04-26 PROCEDURE — 770022 HCHG ROOM/CARE - ICU (200)

## 2018-04-26 PROCEDURE — 700111 HCHG RX REV CODE 636 W/ 250 OVERRIDE (IP): Performed by: INTERNAL MEDICINE

## 2018-04-26 PROCEDURE — 700111 HCHG RX REV CODE 636 W/ 250 OVERRIDE (IP)

## 2018-04-26 PROCEDURE — 700102 HCHG RX REV CODE 250 W/ 637 OVERRIDE(OP): Performed by: STUDENT IN AN ORGANIZED HEALTH CARE EDUCATION/TRAINING PROGRAM

## 2018-04-26 PROCEDURE — 47000 NEEDLE BIOPSY OF LIVER PERQ: CPT

## 2018-04-26 PROCEDURE — A9270 NON-COVERED ITEM OR SERVICE: HCPCS | Performed by: STUDENT IN AN ORGANIZED HEALTH CARE EDUCATION/TRAINING PROGRAM

## 2018-04-26 RX ORDER — MIDAZOLAM HYDROCHLORIDE 1 MG/ML
INJECTION INTRAMUSCULAR; INTRAVENOUS
Status: COMPLETED
Start: 2018-04-26 | End: 2018-04-26

## 2018-04-26 RX ORDER — MIDAZOLAM HYDROCHLORIDE 1 MG/ML
.5-2 INJECTION INTRAMUSCULAR; INTRAVENOUS PRN
Status: ACTIVE | OUTPATIENT
Start: 2018-04-26 | End: 2018-04-26

## 2018-04-26 RX ORDER — LIDOCAINE HYDROCHLORIDE 10 MG/ML
INJECTION, SOLUTION INFILTRATION; PERINEURAL
Status: DISPENSED
Start: 2018-04-26 | End: 2018-04-27

## 2018-04-26 RX ORDER — ONDANSETRON 2 MG/ML
4 INJECTION INTRAMUSCULAR; INTRAVENOUS PRN
Status: ACTIVE | OUTPATIENT
Start: 2018-04-26 | End: 2018-04-26

## 2018-04-26 RX ORDER — SODIUM CHLORIDE 9 MG/ML
500 INJECTION, SOLUTION INTRAVENOUS
Status: ACTIVE | OUTPATIENT
Start: 2018-04-26 | End: 2018-04-26

## 2018-04-26 RX ADMIN — LACTULOSE 30 ML: 20 SOLUTION ORAL at 20:30

## 2018-04-26 RX ADMIN — HYDROCORTISONE SODIUM SUCCINATE 50 MG: 100 INJECTION, POWDER, FOR SOLUTION INTRAMUSCULAR; INTRAVENOUS at 23:54

## 2018-04-26 RX ADMIN — VASOPRESSIN 0.03 UNITS/MIN: 20 INJECTION INTRAVENOUS at 05:57

## 2018-04-26 RX ADMIN — MIDAZOLAM HYDROCHLORIDE 1 MG: 1 INJECTION INTRAMUSCULAR; INTRAVENOUS at 14:46

## 2018-04-26 RX ADMIN — FENTANYL CITRATE 25 MCG: 50 INJECTION, SOLUTION INTRAMUSCULAR; INTRAVENOUS at 14:46

## 2018-04-26 RX ADMIN — BUDESONIDE AND FORMOTEROL FUMARATE DIHYDRATE 2 PUFF: 160; 4.5 AEROSOL RESPIRATORY (INHALATION) at 20:30

## 2018-04-26 RX ADMIN — HYDROCORTISONE SODIUM SUCCINATE 50 MG: 100 INJECTION, POWDER, FOR SOLUTION INTRAMUSCULAR; INTRAVENOUS at 17:40

## 2018-04-26 RX ADMIN — CEFTRIAXONE SODIUM 1 G: 10 INJECTION, POWDER, FOR SOLUTION INTRAVENOUS at 09:15

## 2018-04-26 RX ADMIN — HYDROCORTISONE SODIUM SUCCINATE 50 MG: 100 INJECTION, POWDER, FOR SOLUTION INTRAMUSCULAR; INTRAVENOUS at 05:37

## 2018-04-26 RX ADMIN — MIDAZOLAM 1 MG: 1 INJECTION INTRAMUSCULAR; INTRAVENOUS at 14:46

## 2018-04-26 RX ADMIN — TIOTROPIUM BROMIDE 1 CAPSULE: 18 CAPSULE ORAL; RESPIRATORY (INHALATION) at 09:15

## 2018-04-26 RX ADMIN — DOXYCYCLINE 100 MG: 100 TABLET ORAL at 20:30

## 2018-04-26 RX ADMIN — HYDROCORTISONE SODIUM SUCCINATE 50 MG: 100 INJECTION, POWDER, FOR SOLUTION INTRAMUSCULAR; INTRAVENOUS at 12:35

## 2018-04-26 RX ADMIN — TRAZODONE HYDROCHLORIDE 100 MG: 100 TABLET ORAL at 20:30

## 2018-04-26 RX ADMIN — HYDROCORTISONE SODIUM SUCCINATE 50 MG: 100 INJECTION, POWDER, FOR SOLUTION INTRAMUSCULAR; INTRAVENOUS at 00:46

## 2018-04-26 RX ADMIN — BUDESONIDE AND FORMOTEROL FUMARATE DIHYDRATE 2 PUFF: 160; 4.5 AEROSOL RESPIRATORY (INHALATION) at 09:15

## 2018-04-26 ASSESSMENT — ENCOUNTER SYMPTOMS
WEAKNESS: 1
HEARTBURN: 0
DIZZINESS: 0
BLOOD IN STOOL: 0
HALLUCINATIONS: 0
FALLS: 0
WHEEZING: 0
DIARRHEA: 0
EYES NEGATIVE: 1
TINGLING: 0
DEPRESSION: 0
BRUISES/BLEEDS EASILY: 1
SORE THROAT: 0
NERVOUS/ANXIOUS: 0
CHILLS: 0
EYE PAIN: 0
PHOTOPHOBIA: 0
LOSS OF CONSCIOUSNESS: 0
ABDOMINAL PAIN: 0
HEMOPTYSIS: 0
FOCAL WEAKNESS: 0
SPEECH CHANGE: 0
MUSCULOSKELETAL NEGATIVE: 1
FEVER: 0
SHORTNESS OF BREATH: 0
STRIDOR: 0
COUGH: 1
PALPITATIONS: 0
SEIZURES: 0
ORTHOPNEA: 0
VOMITING: 0
HEADACHES: 0
INSOMNIA: 0
SHORTNESS OF BREATH: 1
SPUTUM PRODUCTION: 0
NAUSEA: 0
COUGH: 0
DIAPHORESIS: 0

## 2018-04-26 ASSESSMENT — PAIN SCALES - GENERAL
PAINLEVEL_OUTOF10: 0

## 2018-04-26 NOTE — PROGRESS NOTES
Internal Medicine Interval Note  Note Author: Marilyn Gupta M.D.     Name Rox Valdez     1940   Age/Sex 77 y.o. female   MRN [id number]   Code Status Full     After 5PM or if no immediate response to page, please call for cross-coverage  Attending/Team: Dr. Gonda / HAZEL Huber  See Patient List for primary contact information  Call [phone] to page    1st Call - Day resident (R2):   Dr. Gupta  2nd Call -          Reason for interval visit  (Principal Problem)   Hepatic failure (CMS-HCC)    Interval Problem Daily Status Update  (24 hours)   Patient reports doing fine. Peripheral edema +, still looks intra-vascularly depleted. On 2 pressors ( levo & vasopressin ), hence IV albumin bolus was given.   GI saw patient and is planning on trans-jugular liver biopsy tomorrow.   Ammonia increasing, per nursing staff, patient is confuse from times to times, oral lactulose was started.       Review of Systems   Eyes: Negative for blurred vision.   Respiratory: Negative for cough and wheezing.         + dyspnea on exertion.   Cardiovascular: Positive for leg swelling. Negative for chest pain and palpitations.   Gastrointestinal: Negative for abdominal pain, nausea and vomiting.   Genitourinary: Negative for dysuria.   Musculoskeletal: Negative for falls.   Neurological: Positive for dizziness.   Endo/Heme/Allergies: Does not bruise/bleed easily.   Psychiatric/Behavioral: Negative for hallucinations.       Consultants/Specialty  GI     Disposition  ICU    Quality Measures  Quality-Core Measures   Reviewed items::  Labs reviewed, Medications reviewed and Radiology images reviewed  Sanchez catheter::  Critically Ill - Requiring Accurate Measurement of Urinary Output  Central line in place:  Vasopressors  DVT prophylaxis pharmacological::  Contraindicated - High bleeding risk  DVT prophylaxis - mechanical:  SCDs  Ulcer Prophylaxis::  No  Antibiotics:  Treating active infection/contamination beyond 24 hours  perioperative coverage          Physical Exam       Vitals:    04/25/18 1745 04/25/18 1800 04/25/18 1815 04/25/18 1830   BP:       Pulse: (!) 102 (!) 109 (!) 106 (!) 119   Resp:       Temp:  36.2 °C (97.1 °F)     SpO2: 93% 94%     Weight:       Height:         Body mass index is 29.02 kg/m². Weight: 72 kg (158 lb 11.7 oz)  Oxygen Therapy:  Pulse Oximetry: 94 %, O2 (LPM): 2, O2 Delivery: Silicone Nasal Cannula    Physical Exam   Constitutional: She is oriented to person, place, and time. She appears distressed.   HENT:   Head: Normocephalic and atraumatic.   Eyes: EOM are normal. Pupils are equal, round, and reactive to light.   Cardiovascular: Regular rhythm.  Exam reveals no gallop and no friction rub.    No murmur heard.  Tachycardia +.   Pulmonary/Chest: She has no wheezes.   Diminished VBS throughout.    Abdominal: Soft. Bowel sounds are normal. She exhibits no distension. There is no tenderness.   Musculoskeletal: Normal range of motion. She exhibits edema.   Neurological: She is alert and oriented to person, place, and time.   Skin: She is not diaphoretic.   Nursing note and vitals reviewed.        Lab Data Review:   Recent Labs      04/23/18   0436  04/24/18   0500  04/25/18   0517   SODIUM  130*  128*  132*   POTASSIUM  5.5  4.7  4.2   CHLORIDE  105  105  109   CO2  21  18*  17*   BUN  42*  36*  35*   CREATININE  1.89*  1.62*  1.42*   CALCIUM  8.0*  7.9*  7.7*       Recent Labs      04/23/18   0436  04/24/18   0500  04/25/18   0517   ALTSGPT  324*  329*  221*   ASTSGOT  661*  618*  325*   ALKPHOSPHAT  63  81  60   TBILIRUBIN  16.9*  20.8*  19.2*   GLUCOSE  99  136*  165*       Recent Labs      04/22/18   2331   04/23/18   1740  04/24/18   0500  04/25/18   0517   RBC   --    < >  3.45*  3.53*  3.04*   HEMOGLOBIN   --    < >  11.9*  12.2  10.5*   HEMATOCRIT   --    < >  34.1*  34.8*  30.5*   PLATELETCT   --    < >  136*  147*  100*   PROTHROMBTM   --    --    --   31.2*  31.7*   APTT   --    --    --   50.1*   45.6*   INR   --    --    --   3.04*  3.10*   IRON   --    --    --   157   --    FERRITIN  1087.1*   --    --    --    --    TOTIRONBC   --    --    --   185*   --     < > = values in this interval not displayed.       Recent Labs      04/23/18   0436  04/23/18   1740  04/24/18   0500  04/25/18   0517   WBC  7.5  7.9  7.3  7.5   NEUTSPOLYS  71.20  70.50  86.60*  88.90*   LYMPHOCYTES  10.60*  7.50*  4.50*  3.90*   MONOCYTES  13.50*  16.40*  6.50  6.40   EOSINOPHILS  2.90  3.20  0.60  0.00   BASOPHILS  0.70  0.60  0.40  0.10   ASTSGOT  661*   --   618*  325*   ALTSGPT  324*   --   329*  221*   ALKPHOSPHAT  63   --   81  60   TBILIRUBIN  16.9*   --   20.8*  19.2*           Assessment/Plan     * Hepatic failure (CMS-HCC)- (present on admission)   Assessment & Plan    Jaundice, fatigue, poor PO intake.   No fever, chills, abdominal pain, any GI bleeding.   Drinks 3 glasses of wine a day for many years.    Marked juandice and bruising   Mental status within normal limits. No asterixis. No hepatic encephalopathy.    Initial labs , , alk phos 73, total bili 19.2, albumin 2.1, total protein 5.3, ammonia 51  INR 6.03, PTT 53.7, APTT 53. Ferritin very high, transferrin low. Acute hepatitis panel negative. HIV negative. TSH WNL.   Hep B Core total antibody, Hep C viral RNA QUALITATIVE,  Hep B surface antibody. TEG study unremarkable.   Pending SUMAN, AMA, ASMA, A1AT, ceruloplasmin.     Abdominal ultrasound suggests cirrhosis.      MELD score 40, MELD sodium 40, mortality 71.3 %  Child Valenzuela C    - GI onboard, recommended hydration and give IV vitamin K 10 mg for coagulopathy, monitor liver/kidney function. Appreciate input regarding benefit from steroids.   - slight improvement in AST, ALT with conservative management.  Tbil   - continue to hold home apixaban due to child valenzuela C and BLAYNE   - renal/hepatic diet.  - GI planning for intracranial obsity         Acute kidney injury (CMS-HCC)- (present on admission)    Assessment & Plan    Poor PO intake, high BUN/Cr ratio, likely from dehydration  Cr 2.08 (baseline Cr 0.85)  BUN/Cr consistent with pre-renal, Improving with re-hydration      Jese 2.3 %, however patient was on diuretics, UA hyaline casts, no active sediment      Renal ultrasound unremarkable      Improving with re-hydration & pressors.   - Cont IV hydration & wean pressor as able.   - holding home spironolactone, losartan  - renally dose meds        Abnormal chest x-ray- (present on admission)   Assessment & Plan    - ? CAP,  unusual wedge-shaped opacification noted in right lower hemithorax adjacent to the right heart border. This could represent collapse of a portion of the right lung vs PNA. Underlying carcinoma is a possibility. Fibrosis pneumonia or loculated fluid are also possibilities.  2.  Minimal blunting of costophrenic angles bilaterally.  - received ceftriaxone x1 and doxycycline in ED.   - Does not look infectious clinically however given low BP & elevated procalcitonin, there is concern for CAP and hence ceftriaxone & doxycycline has been restarted ( day  today )  - consider CT chest with contrast when her BLAYNE improves  - supportive IS, O2, RT        Hypotension   Assessment & Plan    On IV fluid and pressor ( levo & vasopressin ).   Unclear cause, probably combination of sepsis ( ? Lung source), dehydration, liver failure.   -Hold home BP meds [atenolol 50 mg, losartan 25 mg, spironolactone 25 mg]  -Cont Midodrine 5 mg TID, IV fluid and wean pressor as able.         Lactic acidosis- (present on admission)   Assessment & Plan    LA 4.4, improved to 1.6 with IV fluid.   Resolved.         Atrial fibrillation (CMS-HCC)- (present on admission)   Assessment & Plan    Chronic afib, on atenolol 50 mg, currently holding given her hypotension & apixaban at home.     - Cardiac monitoring   - holding apixaban due to liver failure, elevated INR and BLAYNE        Compression fracture of L1 lumbar vertebra  (CMS-HCC)- (present on admission)   Assessment & Plan    Moderate anterior wedge compression fracture on X ray  Hx of fall many days ago, patient does not remember when  - Fall precaution   - can use lidocaine patch for pain if needed [avoid NSAIDs]        COPD (chronic obstructive pulmonary disease) (CMS-HCC)- (present on admission)   Assessment & Plan    Not on home oxygen  PFT Aug 2017 Ratio 61% FEV1 63%, DLCO WNL   - O2, RT, continue home spiriva, symbicort  - IS.

## 2018-04-26 NOTE — CARE PLAN
Problem: Safety  Goal: Will remain free from falls  Outcome: PROGRESSING AS EXPECTED    Intervention: Implement fall precautions  Patient and family educated on saftey precautions. Bed in low and locked position. Call light within reach. Room near nursing station. Patient encouraged to call staff for help before ambulating. Bed alarm in use.       Problem: Infection  Goal: Will remain free from infection  Outcome: PROGRESSING AS EXPECTED    Intervention: Implement standard precautions and perform hand washing before and after patient contact  Hand hygiene performed before and after assessing patient. Daily CHG bath completed. Scrub the hub prior to accessing IVs. Linens changed daily and PRN when soiled.

## 2018-04-26 NOTE — PROGRESS NOTES
Pt transported to CT via bed with transport monitor in use. VSS during transport. Pt on supplemental oxygen during transport. Pt transported by ACLS RN and transport tech. Pt's chart transported with pt.

## 2018-04-26 NOTE — PROGRESS NOTES
CT Procedure RN's Note:    CT guided liver mass biopsy done by Dr. Wharton; RIGHT lateral mid quadrant access site; pt assessed upon arrival and received and brief report on the patient from the ICU RN, pt on 2L NC, no gtt, pt awake; pt appears comfortable throughout the procedure with no signs of distress or discomfort; x2 cores obtained and sent to pathology; manual pressure held for 10 min over access site by Parker EASLEY, access site CDI, soft with no signs of hematoma or bleeding, gauze and tegaderm for dressing; telephone report given to Demetrius; pt lethargic and back on 2L NC O2 post procedure; pt transported back to room by this RN.

## 2018-04-26 NOTE — PROGRESS NOTES
[facility] ICU Progress Note      Admit Date: 4/22/2018  ICU Day: 4    Resident(s): Liborio Oilver   Attending: HAZEL AMOR/ Dr. Salazar Ha M.D.     Date & Time:   4/26/2018   5:21 PM       Patient ID:    Name:             Rox Valdez   YOB: 1940  Age:                 77 y.o.  female   MRN:               [id number]    HPI:  77 year old female with PMH ox of Atrial fibrillationon apixaban, moderate alcohol intake, COPD not on home oxygen admitted 4/22/2018 after two days history of fatigue, dizziness, and jaundice, two months of generalized pruritus. Found to be hypotensive, and having BLAYNE, elevated liver enzymes, and bilirubin [, , alk phos 73, total bili 19.2, albumin 2.1, total protein 5.3, ammonia 51, INR 6.03, PTT 53.7, APTT 53]. Creatinine 2.08, GFR 23,  LA 4.4-> 2.3, improved with IV fluid.GI consulted, recommended basic supportive care and to IV Vit K, pending immunological and infection panels.        Consultants:  PMA:   GI     Procedures:  CT guided liver biopsy 4/26    Interval Events:    - No acute events overnight A&O x 3 this am   - off pressors this monring  - continue midodrine and stress-dose steroids, CVP 9-16  - A.fib on monitor 's (130's with movement) asymptomatic   - NPO overnight, liver biopsy today   - LFTs improving, T. bryant up   - plts down to 89  - adequate urine output, non-bloody bowel movement     Review of Systems   Constitutional: Negative for chills and fever.   HENT: Negative for sore throat.    Eyes: Negative for photophobia and pain.   Respiratory: Negative for cough, hemoptysis, shortness of breath and stridor.    Cardiovascular: Negative for chest pain and palpitations.   Gastrointestinal: Negative for abdominal pain, diarrhea and vomiting.   Genitourinary: Negative for urgency.   Musculoskeletal: Negative for falls.   Skin:        Diffuse bruising  upper and lower extremities    Neurological: Negative for seizures and loss of  consciousness.   Endo/Heme/Allergies: Bruises/bleeds easily.   Psychiatric/Behavioral: Negative for hallucinations.     Physical Exam   Constitutional: She is oriented to person, place, and time. She appears well-developed. No distress.   markedly jaundiced    HENT:   Head: Normocephalic and atraumatic.   Eyes: Pupils are equal, round, and reactive to light. Right eye exhibits no discharge. Left eye exhibits no discharge. Scleral icterus is present.   Neck: Normal range of motion. No JVD present.   Cardiovascular: Normal heart sounds.  Exam reveals no gallop and no friction rub.    No murmur heard.  Distant heart sounds, irregular rhythm     Pulmonary/Chest: Effort normal. No stridor. No respiratory distress. She has no wheezes.   Faint bibasal crackles, more on the right    Abdominal: Soft. Bowel sounds are normal. She exhibits no distension. There is no tenderness. There is no rebound and no guarding.   Musculoskeletal: Normal range of motion. She exhibits no tenderness or deformity.   Neurological: She is alert and oriented to person, place, and time. No cranial nerve deficit.   Skin: Skin is warm and dry. Capillary refill takes less than 2 seconds. Rash: Diffuse bruising and marked jaundice  She is not diaphoretic.   Psychiatric: She has a normal mood and affect. Her behavior is normal.       Respiratory:     Respiration: 17, Pulse Oximetry: 100 %    Chest Tube Drains:  NA        Invalid input(s): [id number]    HemoDynamics:  Pulse: (!) 121, Heart Rate (Monitored): (!) 110 Blood Pressure : (!) 78/47, NIBP: (!) 84/57 CVP (mm Hg): 3 MM HG    Neuro:    A&O x 4, no CN deficits, moving all extremities. Does have tremor in upper extremities and head, no asterixis       Fluids:    Date 04/26/18 0700 - 04/27/18 0659   Shift 2611-8145 4856-5130 3575-3845 24 Hour Total   I  N  T  A  K  E   I.V. 11.3 0  11.3      Vasopressin Volume 11.3 0  11.3    Shift Total 11.3 0  11.3   O  U  T  P  U  T   Urine 450 0  450       Urine Void (mL) (non-catheter) 450 0  450    Stool          Number of Times Stooled 1 x   1 x    Shift Total 450 0  450   NET -438.8 0  -438.8          Intake/Output Summary (Last 24 hours) at 18 1721  Last data filed at 18 1600   Gross per 24 hour   Intake           291.05 ml   Output             1150 ml   Net          -858.95 ml         Weight: 74.7 kg (164 lb 10.9 oz)  Body mass index is 30.11 kg/m².    Recent Labs      18   0500  18   0517  18   0440   SODIUM  128*  132*  136   POTASSIUM  4.7  4.2  3.9   CHLORIDE  105  109  111   CO2  18*  17*  20   BUN  36*  35*  39*   CREATININE  1.62*  1.42*  1.45*   CALCIUM  7.9*  7.7*  8.7       GI/Nutrition:  Renal/hepatic diet   Recent Labs      18   0500  18   0518   0440   ALTSGPT  329*  221*  207*   ASTSGOT  618*  325*  217*   ALKPHOSPHAT  81  60  69   TBILIRUBIN  20.8*  19.2*  21.3*   GLUCOSE  136*  165*  136*       Heme:  Platelets decreased, TEG shows decreased reaction time   INR elevated 3.10, improved from admission, H/H stable   Recent Labs      18   0500  18   0518   0440   RBC  3.53*  3.04*  2.99*   HEMOGLOBIN  12.2  10.5*  10.4*   HEMATOCRIT  34.8*  30.5*  29.8*   PLATELETCT  147*  100*  89*   PROTHROMBTM  31.2*  31.7*   --    APTT  50.1*  45.6*   --    INR  3.04*  3.10*   --    IRON  157   --    --    TOTIRONBC  185*   --    --        Infectious Disease:    Ceftriaxone and doxy   Temp  Av.5 °C (97.7 °F)  Min: 36.1 °C (97 °F)  Max: 37.1 °C (98.7 °F)  Recent Labs      18   0500  18   0517  18   0440   WBC  7.3  7.5  7.5   NEUTSPOLYS  86.60*  88.90*  88.90*   LYMPHOCYTES  4.50*  3.90*  3.20*   MONOCYTES  6.50  6.40  7.30   EOSINOPHILS  0.60  0.00  0.00   BASOPHILS  0.40  0.10  0.10   ASTSGOT  618*  325*  217*   ALTSGPT  329*  221*  207*   ALKPHOSPHAT  81  60  69   TBILIRUBIN  20.8*  19.2*  21.3*       Meds:  • lidocaine       • NS  500 mL     • fentaNYL  12.5-50 mcg      • midazolam  0.5-2 mg     • ondansetron  4 mg     • lactulose  30 mL     • cefTRIAXone (ROCEPHIN) IV  1 g     • doxycycline monohydrate  100 mg     • midodrine  10 mg     • vasopressin (PITRESSIN) infusion  0.03 Units/min Stopped (04/26/18 0715)   • traZODone  100 mg     • thiamine  100 mg      And   • folic acid  1 mg      And   • multivitamin  1 Tab     • NORepinephrine (LEVOPHED) infusion  0.5-30 mcg/min Stopped (04/25/18 2100)   • hydrocortisone sodium succinate PF  50 mg     • Respiratory Care per Protocol       • budesonide-formoterol  2 Puff     • tiotropium  1 Cap        Problem and Plan:    Hepatic failure (CMS-HCC)  Jaundice, fatigue, poor PO intake.   No fever, chills, abdominal pain, or signs/symptoms of GI bleeding.   Drinks 3 glasses of wine a day for many years.  Marked juandice and bruising   Mental status within normal limits. No asterixis. No hepatic encephalopathy.    Initial labs , , alk phos 73, total bili 19.2, albumin 2.1, total protein 5.3, ammonia 51  INR 6.03, PTT 53.7, APTT 53. Ferritin very high, transferrin low. Acute hepatitis panel negative. HIV negative. TSH WNL.   Hep B Core total antibody, Hep C viral RNA QUALITATIVE,  Hep B surface antibody. TEG study unremarkable.    SUMAN +, titier 1:80, F actin elevated 40, anti-giancarlo 9.0 (WNL)  Abdominal ultrasound suggests cirrhosis.    MELD score 40, MELD sodium 40, mortality 71.3 %  Child Valenzuela C  - GI onboard, recommended supportive care at this time   - s/p liver biopsy today, follow up results   - slight improvement in AST, ALT with conservative management. Tbil continues to rise   - continue to hold home apixaban due to child valenzuela C and BLAYNE   - renal/hepatic diet.    Acute kidney injury (CMS-HCC)  Poor PO intake, high BUN/Cr ratio, likely from dehydration  Cr 2.08 (baseline Cr 0.85)  BUN/Cr consistent with pre-renal, Improving with re-hydration      Jese 2.3 %, however patient was on diuretics, UA hyaline casts, no active  sediment    Renal ultrasound unremarkable   Improving with re-hydration & pressors.   - Cont IV hydration & wean pressor as able.   - holding home spironolactone, losartan  - renally dose meds    Lactic acidosis  LA 4.4, improved to 1.6 with IV fluid.   Resolved.     Hypotension  On IV fluid and pressor ( levo & vasopressin ) on admission,   Now off pressors, Unclear cause, probably combination of sepsis ( ? Lung source), dehydration, liver failure.   -Hold home BP meds [atenolol 50 mg, losartan 25 mg, spironolactone 25 mg]  -Cont Midodrine 10 mg TID, and stress dose steroids     Abnormal chest x-ray  - ? CAP,  unusual wedge-shaped opacification noted in right lower hemithorax adjacent to the right heart border. This could represent collapse of a portion of the right lung vs PNA. Underlying carcinoma is a possibility. Fibrosis pneumonia or loculated fluid are also possibilities.   Minimal blunting of costophrenic angles bilaterally.  - received ceftriaxone x1 and doxycycline in ED  - Does not look infectious clinically however given low BP & elevated procalcitonin, there is concern for CAP and hence ceftriaxone & doxycycline has been restarted, will plan for 5 days total   - consider CT chest with contrast when her BLAYNE improves  - supportive IS, O2, RT    COPD (chronic obstructive pulmonary disease) (CMS-HCC)  Not on home oxygen  PFT Aug 2017 Ratio 61% FEV1 63%, DLCO WNL   - O2, RT, continue home spiriva, symbicort  - IS.     Compression fracture of L1 lumbar vertebra (CMS-HCC)  Moderate anterior wedge compression fracture on X ray  Hx of fall many days ago, patient does not remember when  - Fall precaution   - can use lidocaine patch for pain if needed [avoid NSAIDs]    Atrial fibrillation (CMS-HCC)  Chronic afib, on atenolol 50 mg, currently holding given her hypotension & apixaban at home.   - Cardiac monitoring   - holding apixaban due to liver failure, elevated INR and BLAYNE  - currently rate controlled, does get  tachy with movement however currently asymptomatic, will continue to monitor       Quality Measures:  Lines: PIV  Sanchez Catheter:No  DVT Prophylaxis: SCDs, high risk of bleeding   Ulcer Prophylaxis:N/A  Antibiotics: Ceftriaxone/ Doxy    CODE STATUS: FULL  DISPO: ICU

## 2018-04-26 NOTE — CARE PLAN
Problem: Safety  Goal: Will remain free from injury  Outcome: PROGRESSING AS EXPECTED    Intervention: Provide assistance with mobility  Pt assisted with ambulation by staff. Pt encouraged to participate in activity. Activity progressed at pt's own rate. Pt given rest periods as needed.       Problem: Respiratory:  Goal: Respiratory status will improve  Outcome: PROGRESSING AS EXPECTED    Intervention: Assess and monitor pulmonary status  Pt's respiratory status assessed. continuous pulse oximetry in use. Head of bed elevated. Collaboration with RT. Ambu bag at bedside. Pt encouraged to use incentive spirometer. Pt encouraged to self suction as needed. Supplemental oxygen in use.

## 2018-04-26 NOTE — OR SURGEON
Immediate Post- Operative Note        PostOp Diagnosis: Cirrhosis.       Procedure(s): CT guided liver biopsy.       Estimated Blood Loss: Less than 5 ml        Complications: None            4/26/2018    1456 PM     Faraz Wharton

## 2018-04-26 NOTE — PROGRESS NOTES
Pulmonary Critical Care Progress Note        Admit:  4/22/2018    Chief Complaint:  Weakness/fatigue - low Bp/hepatic and renal failure    History of Present Illness:    This is a 77-year-old lady   with an extensive past medical history.  She comes in to the emergency room   complaining of weakness and fatigue.  She has been having problems with   generalized pruritus for quite some time, but it has become worse for the last   several days.  She has been getting dizzy when she stands for the last 3 days   and about 3 days ago her  noted that she was becoming jaundiced.  She   came in to the emergency department.  In the field, her blood pressure was in   60s systolic.  She is receiving intravenous crystalloid solution.     She admits to drinking 3 glasses of white wine a day, which she has done for   many years.  She occasionally will take acetaminophen for pain, but does not   abuse it.  She has no known history of liver disease.    Review of Systems   Constitutional: Positive for malaise/fatigue. Negative for chills, diaphoresis and fever.   HENT: Negative for congestion and nosebleeds.    Eyes: Negative.    Respiratory: Positive for cough and shortness of breath. Negative for sputum production, wheezing and stridor.    Cardiovascular: Positive for leg swelling. Negative for chest pain, palpitations and orthopnea.   Gastrointestinal: Negative for abdominal pain, blood in stool and heartburn.   Genitourinary: Negative.    Musculoskeletal: Negative.    Skin:        Positive jaundice   Neurological: Positive for weakness. Negative for dizziness, tingling, speech change, focal weakness, loss of consciousness and headaches.   Endo/Heme/Allergies: Bruises/bleeds easily.   Psychiatric/Behavioral: Negative for depression. The patient is not nervous/anxious and does not have insomnia.        Interval Events:  24 hour interval history reviewed    - off pressors this morning with increase in midodrine and initiation  of stress-dose steroids   - A-fib with mostly controlled rate (speeds up with exertion)   - CVP 9-13   - improving LE edema   - AF, nl WBC   - BMs without blood   - plts dropped to 89   - NPO since midnight for liver biopsy today   - BUN/crt unchanged, good UOP   - LFTs down trending, T bili up    PFSH:  No change.    Respiratory:    2 lpm n/c, BID treatments per home regimen  Pulse Oximetry: 98 %          Exam: no wheeze, speaking in full sentences, unlabored respirations, no intercostal retractions or accessory muscle use and rhonchi bibasilar     ImagingCXR  I have personally reviewed the chest x-ray my impression is  (compared to prior film) no chest x-ray today           Invalid input(s): [id number]    HemoDynamics:  Pulse: (!) 108, Heart Rate (Monitored): (!) 125  NIBP: 102/66  CVP (mm Hg): (!) 13 MM HG CVP 9-13    Exam: mild improvement in anasarca, atrial fibrillation, irregularly irregular rhythm, murmur, tachycardia    Imaging: Available data reviewed    Echocardiogram 4/24: CONCLUSIONS  No prior study is available for comparison.   Left ventricular ejection fraction is visually estimated to be 70%.  Severely dilated left atrium.  Enlarged right atrium.  Estimated right ventricular systolic pressure  is 35 mmHg.  Recent Labs      04/23/18   1740  04/24/18   0500   TROPONINI  0.02   --    BNPBTYPENAT   --   180*       Neuro:  GCS Total Delmar Coma Score: 15       Exam: generalized weakness, anxious about procedure today no focal deficits noted mental status intact oriented for age x3 Motor and sensory exam grossly intact  Imaging: Available data reviewed    Fluids:  Intake/Output       04/24/18 0700 - 04/25/18 0659 04/25/18 0700 - 04/26/18 0659 04/26/18 0700 - 04/27/18 0659      [id number] 0608-3182 Total 3842-7842 9062-8689 Total 2213-5931 8949-5921 Total       Intake    P.O.  550  60 610  460  50 510  --  -- --    P.O. 550 60 610 460 50 510 -- -- --    I.V.  1765.8  637.3 2403  630.4  108 738.4  --   -- --    Vasopressin Volume 81 108 189 108 102.3 210.3 -- -- --    Norepinephrine Volume 176.8 29.3 206 22.4 5.7 28.1 -- -- --    IV Volume (Normal Saline) 4969 013 1187 250 -- 250 -- -- --    IV Volume (Rally Bag ) 8 -- 8 -- -- -- -- -- --    IV Volume (< Albumin >) -- -- -- 250 -- 250 -- -- --    Total Intake 2315.8 697.3 3013 1090.4 158 1248.4 -- -- --       Output    Urine  575  450 1025  400  700 1100  --  -- --    Number of Times Voided -- -- -- -- 4 x 4 x -- -- --    Urine Void (mL) (non-catheter)   -- -- --    Stool  --  -- --  --  0 0  --  -- --    Number of Times Stooled 4 x -- 4 x -- 2 x 2 x -- -- --    Measurable Stool (mL) -- -- -- -- 0 0 -- -- --    Total Output   -- -- --       Net I/O     1740.8 247.3 1988 690.4 -542 148.4 -- -- --        Weight: 74.7 kg (164 lb 10.9 oz)  Recent Labs      04/24/18   0500  04/25/18   0517  04/26/18   0440   SODIUM  128*  132*  136   POTASSIUM  4.7  4.2  3.9   CHLORIDE  105  109  111   CO2  18*  17*  20   BUN  36*  35*  39*   CREATININE  1.62*  1.42*  1.45*   CALCIUM  7.9*  7.7*  8.7       GI/Nutrition:  Exam: distended with fluid shift, abdomen is soft and non-tender, normal active bowel sounds, without rebound  Imaging: Available data reviewed    Abdominal ultrasound 4/22:  1.  Lobular liver surface and heterogeneous liver echogenicity are consistent with cirrhosis.    2.  Post cholecystectomy.    3.  No biliary ductal dilatation appreciated.  NPO for procedure     Liver Function  Recent Labs      04/24/18   0500  04/25/18   0517  04/26/18   0440   ALTSGPT  329*  221*  207*   ASTSGOT  618*  325*  217*   ALKPHOSPHAT  81  60  69   TBILIRUBIN  20.8*  19.2*  21.3*   GLUCOSE  136*  165*  136*       Heme:  Recent Labs      04/24/18   0500  04/25/18   0517  04/26/18   0440   RBC  3.53*  3.04*  2.99*   HEMOGLOBIN  12.2  10.5*  10.4*   HEMATOCRIT  34.8*  30.5*  29.8*   PLATELETCT  147*  100*  89*   PROTHROMBTM  31.2*  31.7*   --     APTT  50.1*  45.6*   --    INR  3.04*  3.10*   --    IRON  157   --    --    TOTIRONBC  185*   --    --        Infectious Disease:  Temp  Av.6 °C (97.9 °F)  Min: 36.2 °C (97.1 °F)  Max: 37.1 °C (98.7 °F)  Micro: reviewed negative    Recent Labs      18   0500  18   0517  18   0440   WBC  7.3  7.5  7.5   NEUTSPOLYS  86.60*  88.90*  88.90*   LYMPHOCYTES  4.50*  3.90*  3.20*   MONOCYTES  6.50  6.40  7.30   EOSINOPHILS  0.60  0.00  0.00   BASOPHILS  0.40  0.10  0.10   ASTSGOT  618*  325*  217*   ALTSGPT  329*  221*  207*   ALKPHOSPHAT  81  60  69   TBILIRUBIN  20.8*  19.2*  21.3*     Current Facility-Administered Medications   Medication Dose Frequency Provider Last Rate Last Dose   • lactulose 20 GM/30ML solution 30 mL  30 mL TID we VENICE Gupta M.D.   30 mL at 18   • cefTRIAXone (ROCEPHIN) syringe 1 g  1 g Q24HRS Jeremy M Gonda, M.D.       • doxycycline monohydrate (ADOXA) tablet 100 mg  100 mg Q12HRS Jeremy M Gonda, M.D.   100 mg at 18   • midodrine (PROAMATINE) tablet 10 mg  10 mg TID WITH MEALS Jeremy M Gonda, M.D.   Stopped at 18 0730   • vasopressin (VASOSTRICT) 20 Units in  mL Infusion  0.03 Units/min Continuous Salazar Ha M.D.   Stopped at 18 0715   • traZODone (DESYREL) tablet 100 mg  100 mg QHS Jasper Torres M.D.   100 mg at 18   • thiamine (THIAMINE) tablet 100 mg  100 mg DAILY Marie Panchal M.D.   100 mg at 18 0941    And   • folic acid (FOLVITE) tablet 1 mg  1 mg DAILY Marie Panchal M.D.   1 mg at 18 0941    And   • multivitamin (THERAGRAN) tablet 1 Tab  1 Tab DAILY Marie Panchal M.D.   1 Tab at 18 0941   • norepinephrine (LEVOPHED) 8 mg in  mL Infusion  0.5-30 mcg/min Continuous Salazar Ha M.D.   Stopped at 18 2100   • hydrocortisone sodium succinate PF (SOLU-CORTEF) 100 MG injection 50 mg  50 mg Q6HRS Marie Panchal M.D.   50 mg at 18  0537   • Respiratory Care per Protocol   Continuous RT Marie Panchal M.D.       • budesonide-formoterol (SYMBICORT) 160-4.5 MCG/ACT inhaler 2 Puff  2 Puff BID Marie Panchal M.D.   2 Puff at 04/25/18 2032   • tiotropium (SPIRIVA) 18 MCG inhalation capsule 1 Cap  1 Cap DAILY Marie Panchal M.D.   1 Cap at 04/25/18 0928     Last reviewed on 4/22/2018  9:51 PM by Rachna Smith R.N.    Quality  Measures:   Labs reviewed, Radiology images reviewed and Medications reviewed   Sanchez catheter: Critically Ill - Requiring Accurate Measurement of Urinary Output   Central line in place: Need for access     DVT Prophylaxis: Contraindicated - High bleeding risk   DVT prophylaxis - mechanical: SCDs   Ulcer prophylaxis: Yes   Antibiotics: Treating active infection/contamination beyond 24 hours perioperative coverage  Assessed for rehab: Patient was assess for and/or received rehabilitation services during this hospitalization      Problems/plan:  Undifferentiated shock, likely combined vasodistributive from sepsis plus liver disease, ?QUAN - improving   - Strict blood pressure monitoring with goal MAP greater than 60   - cont midodrine at current dose   - s/p albumin infusions achieving goal CVP of 8-10   - Antibiotics/sepsis protocol, follow up on final cultures   - Continue stress dose steroids without weaning yet  Acute liver failure, etiology not entirely clear - ETOH vs autoimmune   - GI following, liver biopsy today, serologies pending  Acute kidney injury - unchanged   - Avoid nephrotoxins, monitor urine output, maintain perfusion  Hyponatremia - improving    - Monitor  Intravascular volume depletion - resolved S/P resuscitation   - trend CVP  Abnormal chest x-ray concerning for pneumonia   - Continue antibiotics, follow up on cultures  Anemia   - Monitor   - Conservative transfusion strategy  Moderate stage II COPD with an FEV1 of 1.11 liters, 63% of predicted.  There is no acute exacerbation   -  O2/RT protocols, bronchodilators as needed  History of systemic arterial hypertension - currently hypotensive   - Continue holding medications  Chronic Atrial fibrillation - Rate controlled   - Rate control, hold on anticoagulants for now  Cor pulmonale  Thrombocytopenia  Prophylaxis, nothing by mouth for procedure    Patient remains critically ill today requiring my active management of her tenuous low pressure, fluid management, liver failure, sepsis resolution.    Discussed patient condition and risk of morbidity and/or mortality with Family, RN, RT, Pharmacy, [employer] resident, Charge nurse / hot rounds, Patient and GI.    The patient remains critically ill.  Critical care time = 31 minutes in directly providing and coordinating critical care and extensive data review.  No time overlap and excludes procedures.

## 2018-04-26 NOTE — PROGRESS NOTES
Gastroenterology Progress Note     Author: Jony Spearhzadeh   Date & Time Created: 4/26/2018 12:07 PM    Chief Complaint:  Jaundice    Interval History:  No new c/o.    Review of Systems:  Review of Systems   Constitutional: Negative for chills and fever.   Gastrointestinal: Negative for blood in stool, melena, nausea and vomiting.       Physical Exam:  Physical Exam   Constitutional: She is oriented to person, place, and time. She appears distressed.   Eyes: Scleral icterus is present.   Cardiovascular: Normal rate and regular rhythm.    Pulmonary/Chest: Effort normal and breath sounds normal.   Abdominal: Soft. Bowel sounds are normal. She exhibits no distension. There is no tenderness. There is no rebound.   Musculoskeletal: She exhibits edema.   Neurological: She is alert and oriented to person, place, and time.   Skin: Skin is warm and dry.   Nursing note and vitals reviewed.      Labs:        Invalid input(s): [id number]  Recent Labs      04/23/18   1740  04/24/18   0500   TROPONINI  0.02   --    BNPBTYPENAT   --   180*     Recent Labs      04/24/18   0500  04/25/18   0517  04/26/18   0440   SODIUM  128*  132*  136   POTASSIUM  4.7  4.2  3.9   CHLORIDE  105  109  111   CO2  18*  17*  20   BUN  36*  35*  39*   CREATININE  1.62*  1.42*  1.45*   CALCIUM  7.9*  7.7*  8.7     Recent Labs      04/24/18   0500  04/25/18   0517  04/26/18   0440   ALTSGPT  329*  221*  207*   ASTSGOT  618*  325*  217*   ALKPHOSPHAT  81  60  69   TBILIRUBIN  20.8*  19.2*  21.3*   GLUCOSE  136*  165*  136*     Recent Labs      04/24/18   0500  04/25/18   0517  04/26/18   0440   RBC  3.53*  3.04*  2.99*   HEMOGLOBIN  12.2  10.5*  10.4*   HEMATOCRIT  34.8*  30.5*  29.8*   PLATELETCT  147*  100*  89*   PROTHROMBTM  31.2*  31.7*   --    APTT  50.1*  45.6*   --    INR  3.04*  3.10*   --    IRON  157   --    --    TOTIRONBC  185*   --    --      Recent Labs      04/24/18   0500  04/25/18   0517  04/26/18   0440   WBC  7.3  7.5  7.5    NEUTSPOLYS  86.60*  88.90*  88.90*   LYMPHOCYTES  4.50*  3.90*  3.20*   MONOCYTES  6.50  6.40  7.30   EOSINOPHILS  0.60  0.00  0.00   BASOPHILS  0.40  0.10  0.10   ASTSGOT  618*  325*  217*   ALTSGPT  329*  221*  207*   ALKPHOSPHAT  81  60  69   TBILIRUBIN  20.8*  19.2*  21.3*     Hemodynamics:  Temp (24hrs), Av.6 °C (97.9 °F), Min:36.2 °C (97.1 °F), Max:37.1 °C (98.7 °F)  Temperature: 37.1 °C (98.7 °F)  Pulse  Av.7  Min: 42  Max: 134Heart Rate (Monitored): (!) 125  NIBP: 102/66  CVP (mm Hg): (!) 13 MM HG  Respiratory:    Pulse Oximetry: 98 %        RUL Breath Sounds: Clear, RML Breath Sounds: Diminished, RLL Breath Sounds: Diminished, ERICK Breath Sounds: Clear, LLL Breath Sounds: Diminished  Fluids:    Intake/Output Summary (Last 24 hours) at 18 1329  Last data filed at 18 1200   Gross per 24 hour   Intake          5904.62 ml   Output              825 ml   Net          5079.62 ml     Weight: 74.7 kg (164 lb 10.9 oz)  GI/Nutrition:  Orders Placed This Encounter   Procedures   • DIET NPO     Standing Status:   Standing     Number of Occurrences:   1     Order Specific Question:   Type:     Answer:   Now [1]     Order Specific Question:   Restrict to:     Answer:   Sips with Medications [3]     Medical Decision Making, by Problem:  Active Hospital Problems    Diagnosis   • *Hepatic failure: Appears to have an acute hepatitis on underlying chronic liver disease. Suspect component chronic EtOH liver disease with possible overlapping AIH.   • Acute kidney injury: Improving.   • Atrial fibrillation   • COPD (chronic obstructive pulmonary disease)   • Anemia   • Coagulopathy        Plan:  Supportive care.  Await additional serology to determine cause of liver disease.  Transjugular liver biopsy.    Quality-Core Measures   Reviewed items::  Radiology images reviewed, Labs reviewed and Medications reviewed    Gastroenterology Progress Note     Author: Jony Carr   Date & Time Created: 2018  11:06 AM    Chief Complaint:  Jaundice    Interval History:  No new c/o.    Review of Systems:  Review of Systems   Constitutional: Negative for chills and fever.   Gastrointestinal: Negative for blood in stool, melena, nausea and vomiting.       Physical Exam:  Physical Exam   Constitutional: She is oriented to person, place, and time. She appears distressed.   Eyes: Scleral icterus is present.   Cardiovascular: Normal rate and regular rhythm.    Pulmonary/Chest: Effort normal and breath sounds normal.   Abdominal: Soft. Bowel sounds are normal. She exhibits no distension. There is no tenderness. There is no rebound.   Musculoskeletal: She exhibits edema.   Neurological: She is alert and oriented to person, place, and time.   Skin: Skin is warm and dry.   Nursing note and vitals reviewed.      Labs:        Invalid input(s): [id number]  Recent Labs      04/23/18   1740  04/24/18   0500   TROPONINI  0.02   --    BNPBTYPENAT   --   180*     Recent Labs      04/24/18   0500  04/25/18   0517  04/26/18   0440   SODIUM  128*  132*  136   POTASSIUM  4.7  4.2  3.9   CHLORIDE  105  109  111   CO2  18*  17*  20   BUN  36*  35*  39*   CREATININE  1.62*  1.42*  1.45*   CALCIUM  7.9*  7.7*  8.7     Recent Labs      04/24/18   0500  04/25/18   0517 04/26/18   0440   ALTSGPT  329*  221*  207*   ASTSGOT  618*  325*  217*   ALKPHOSPHAT  81  60  69   TBILIRUBIN  20.8*  19.2*  21.3*   GLUCOSE  136*  165*  136*     Recent Labs      04/24/18   0500  04/25/18   0517  04/26/18   0440   RBC  3.53*  3.04*  2.99*   HEMOGLOBIN  12.2  10.5*  10.4*   HEMATOCRIT  34.8*  30.5*  29.8*   PLATELETCT  147*  100*  89*   PROTHROMBTM  31.2*  31.7*   --    APTT  50.1*  45.6*   --    INR  3.04*  3.10*   --    IRON  157   --    --    TOTIRONBC  185*   --    --      Recent Labs      04/24/18   0500  04/25/18   0517  04/26/18   0440   WBC  7.3  7.5  7.5   NEUTSPOLYS  86.60*  88.90*  88.90*   LYMPHOCYTES  4.50*  3.90*  3.20*   MONOCYTES  6.50  6.40  7.30    EOSINOPHILS  0.60  0.00  0.00   BASOPHILS  0.40  0.10  0.10   ASTSGOT  618*  325*  217*   ALTSGPT  329*  221*  207*   ALKPHOSPHAT  81  60  69   TBILIRUBIN  20.8*  19.2*  21.3*     Hemodynamics:  Temp (24hrs), Av.6 °C (97.9 °F), Min:36.2 °C (97.1 °F), Max:37.1 °C (98.7 °F)  Temperature: 37.1 °C (98.7 °F)  Pulse  Av.7  Min: 42  Max: 134Heart Rate (Monitored): (!) 125  NIBP: 102/66  CVP (mm Hg): (!) 13 MM HG  Respiratory:    Pulse Oximetry: 98 %        RUL Breath Sounds: Clear, RML Breath Sounds: Diminished, RLL Breath Sounds: Diminished, ERICK Breath Sounds: Clear, LLL Breath Sounds: Diminished  Fluids:    Intake/Output Summary (Last 24 hours) at 18 1329  Last data filed at 18 1200   Gross per 24 hour   Intake          5904.62 ml   Output              825 ml   Net          5079.62 ml     Weight: 74.7 kg (164 lb 10.9 oz)  GI/Nutrition:  Orders Placed This Encounter   Procedures   • DIET NPO     Standing Status:   Standing     Number of Occurrences:   1     Order Specific Question:   Type:     Answer:   Now [1]     Order Specific Question:   Restrict to:     Answer:   Sips with Medications [3]     Medical Decision Making, by Problem:  Active Hospital Problems    Diagnosis   • *Hepatic failure: Appears to have an acute hepatitis on underlying chronic liver disease. Suspect component chronic EtOH liver disease with possible overlapping AIH.   • Acute kidney injury: Improving.   • Atrial fibrillation   • COPD (chronic obstructive pulmonary disease)   • Anemia   • Coagulopathy   Thrombocytopenia       Plan:  Supportive care.  Transjugular liver biopsy today.    Quality-Core Measures   Reviewed items::  Radiology images reviewed, Labs reviewed and Medications reviewed

## 2018-04-26 NOTE — PROGRESS NOTES
Pt arrived back to room S-134 from CT. Pt transported via bed. VSS upon arrival. Supplemental oxygen in use. Pt's chart transported with pt. Pt transported by ACLS RN x 2.

## 2018-04-27 LAB
ALBUMIN SERPL BCP-MCNC: 2.2 G/DL (ref 3.2–4.9)
ALBUMIN/GLOB SERPL: 0.8 G/DL
ALP SERPL-CCNC: 68 U/L (ref 30–99)
ALT SERPL-CCNC: 192 U/L (ref 2–50)
AMMONIA PLAS-SCNC: 63 UMOL/L (ref 11–45)
ANION GAP SERPL CALC-SCNC: 8 MMOL/L (ref 0–11.9)
AST SERPL-CCNC: 174 U/L (ref 12–45)
BACTERIA BLD CULT: NORMAL
BACTERIA BLD CULT: NORMAL
BASOPHILS # BLD AUTO: 0.1 % (ref 0–1.8)
BASOPHILS # BLD: 0.01 K/UL (ref 0–0.12)
BILIRUB SERPL-MCNC: 21.5 MG/DL (ref 0.1–1.5)
BUN SERPL-MCNC: 45 MG/DL (ref 8–22)
CALCIUM SERPL-MCNC: 9.1 MG/DL (ref 8.5–10.5)
CHLORIDE SERPL-SCNC: 111 MMOL/L (ref 96–112)
CO2 SERPL-SCNC: 19 MMOL/L (ref 20–33)
CREAT SERPL-MCNC: 1.45 MG/DL (ref 0.5–1.4)
EOSINOPHIL # BLD AUTO: 0 K/UL (ref 0–0.51)
EOSINOPHIL NFR BLD: 0 % (ref 0–6.9)
ERYTHROCYTE [DISTWIDTH] IN BLOOD BY AUTOMATED COUNT: 64.7 FL (ref 35.9–50)
GLOBULIN SER CALC-MCNC: 2.8 G/DL (ref 1.9–3.5)
GLUCOSE SERPL-MCNC: 129 MG/DL (ref 65–99)
HCT VFR BLD AUTO: 32.7 % (ref 37–47)
HGB BLD-MCNC: 11.3 G/DL (ref 12–16)
IMM GRANULOCYTES # BLD AUTO: 0.06 K/UL (ref 0–0.11)
IMM GRANULOCYTES NFR BLD AUTO: 0.7 % (ref 0–0.9)
LYMPHOCYTES # BLD AUTO: 0.26 K/UL (ref 1–4.8)
LYMPHOCYTES NFR BLD: 2.8 % (ref 22–41)
MCH RBC QN AUTO: 34.6 PG (ref 27–33)
MCHC RBC AUTO-ENTMCNC: 34.6 G/DL (ref 33.6–35)
MCV RBC AUTO: 100 FL (ref 81.4–97.8)
MONOCYTES # BLD AUTO: 0.75 K/UL (ref 0–0.85)
MONOCYTES NFR BLD AUTO: 8.2 % (ref 0–13.4)
NEUTROPHILS # BLD AUTO: 8.08 K/UL (ref 2–7.15)
NEUTROPHILS NFR BLD: 88.2 % (ref 44–72)
NRBC # BLD AUTO: 0 K/UL
NRBC BLD-RTO: 0 /100 WBC
PLATELET # BLD AUTO: 100 K/UL (ref 164–446)
PMV BLD AUTO: 10.8 FL (ref 9–12.9)
POTASSIUM SERPL-SCNC: 4 MMOL/L (ref 3.6–5.5)
PROT SERPL-MCNC: 5 G/DL (ref 6–8.2)
RBC # BLD AUTO: 3.27 M/UL (ref 4.2–5.4)
SIGNIFICANT IND 70042: NORMAL
SIGNIFICANT IND 70042: NORMAL
SITE SITE: NORMAL
SITE SITE: NORMAL
SMA IGG SER-ACNC: 40 UNITS (ref 0–19)
SMOOTH MUSCLE IGG TITR SER: ABNORMAL {TITER}
SODIUM SERPL-SCNC: 138 MMOL/L (ref 135–145)
SOURCE SOURCE: NORMAL
SOURCE SOURCE: NORMAL
WBC # BLD AUTO: 9.2 K/UL (ref 4.8–10.8)

## 2018-04-27 PROCEDURE — 700102 HCHG RX REV CODE 250 W/ 637 OVERRIDE(OP): Performed by: INTERNAL MEDICINE

## 2018-04-27 PROCEDURE — 700101 HCHG RX REV CODE 250: Performed by: INTERNAL MEDICINE

## 2018-04-27 PROCEDURE — 700102 HCHG RX REV CODE 250 W/ 637 OVERRIDE(OP): Performed by: STUDENT IN AN ORGANIZED HEALTH CARE EDUCATION/TRAINING PROGRAM

## 2018-04-27 PROCEDURE — 700105 HCHG RX REV CODE 258: Performed by: INTERNAL MEDICINE

## 2018-04-27 PROCEDURE — A9270 NON-COVERED ITEM OR SERVICE: HCPCS | Performed by: STUDENT IN AN ORGANIZED HEALTH CARE EDUCATION/TRAINING PROGRAM

## 2018-04-27 PROCEDURE — A9270 NON-COVERED ITEM OR SERVICE: HCPCS | Performed by: INTERNAL MEDICINE

## 2018-04-27 PROCEDURE — 82140 ASSAY OF AMMONIA: CPT

## 2018-04-27 PROCEDURE — 700111 HCHG RX REV CODE 636 W/ 250 OVERRIDE (IP): Performed by: INTERNAL MEDICINE

## 2018-04-27 PROCEDURE — 51798 US URINE CAPACITY MEASURE: CPT

## 2018-04-27 PROCEDURE — 80053 COMPREHEN METABOLIC PANEL: CPT

## 2018-04-27 PROCEDURE — 770022 HCHG ROOM/CARE - ICU (200)

## 2018-04-27 PROCEDURE — 85025 COMPLETE CBC W/AUTO DIFF WBC: CPT

## 2018-04-27 RX ORDER — DIGOXIN 0.25 MG/ML
250 INJECTION INTRAMUSCULAR; INTRAVENOUS ONCE
Status: COMPLETED | OUTPATIENT
Start: 2018-04-27 | End: 2018-04-27

## 2018-04-27 RX ADMIN — TIOTROPIUM BROMIDE 1 CAPSULE: 18 CAPSULE ORAL; RESPIRATORY (INHALATION) at 08:09

## 2018-04-27 RX ADMIN — LACTULOSE 30 ML: 20 SOLUTION ORAL at 08:08

## 2018-04-27 RX ADMIN — MIDODRINE HYDROCHLORIDE 10 MG: 5 TABLET ORAL at 08:09

## 2018-04-27 RX ADMIN — LACTULOSE 30 ML: 20 SOLUTION ORAL at 21:48

## 2018-04-27 RX ADMIN — BUDESONIDE AND FORMOTEROL FUMARATE DIHYDRATE 2 PUFF: 160; 4.5 AEROSOL RESPIRATORY (INHALATION) at 08:09

## 2018-04-27 RX ADMIN — FOLIC ACID 1 MG: 1 TABLET ORAL at 08:09

## 2018-04-27 RX ADMIN — DOXYCYCLINE 100 MG: 100 TABLET ORAL at 08:08

## 2018-04-27 RX ADMIN — VASOPRESSIN 0.03 UNITS/MIN: 20 INJECTION INTRAVENOUS at 21:49

## 2018-04-27 RX ADMIN — MIDODRINE HYDROCHLORIDE 10 MG: 5 TABLET ORAL at 11:43

## 2018-04-27 RX ADMIN — NOREPINEPHRINE BITARTRATE 2 MCG/MIN: 1 INJECTION INTRAVENOUS at 02:44

## 2018-04-27 RX ADMIN — VASOPRESSIN 0.03 UNITS/MIN: 20 INJECTION INTRAVENOUS at 09:49

## 2018-04-27 RX ADMIN — HYDROCORTISONE SODIUM SUCCINATE 50 MG: 100 INJECTION, POWDER, FOR SOLUTION INTRAMUSCULAR; INTRAVENOUS at 11:43

## 2018-04-27 RX ADMIN — BUDESONIDE AND FORMOTEROL FUMARATE DIHYDRATE 2 PUFF: 160; 4.5 AEROSOL RESPIRATORY (INHALATION) at 21:49

## 2018-04-27 RX ADMIN — Medication 100 MG: at 08:08

## 2018-04-27 RX ADMIN — THERA TABS 1 TABLET: TAB at 08:08

## 2018-04-27 RX ADMIN — MIDODRINE HYDROCHLORIDE 10 MG: 5 TABLET ORAL at 17:36

## 2018-04-27 RX ADMIN — DIGOXIN 250 MCG: 0.25 INJECTION INTRAMUSCULAR; INTRAVENOUS at 09:40

## 2018-04-27 RX ADMIN — HYDROCORTISONE SODIUM SUCCINATE 50 MG: 100 INJECTION, POWDER, FOR SOLUTION INTRAMUSCULAR; INTRAVENOUS at 17:36

## 2018-04-27 RX ADMIN — HYDROCORTISONE SODIUM SUCCINATE 50 MG: 100 INJECTION, POWDER, FOR SOLUTION INTRAMUSCULAR; INTRAVENOUS at 06:17

## 2018-04-27 ASSESSMENT — ENCOUNTER SYMPTOMS
DIAPHORESIS: 0
DIARRHEA: 0
SORE THROAT: 0
NERVOUS/ANXIOUS: 0
EYES NEGATIVE: 1
CHILLS: 0
SPEECH CHANGE: 0
SEIZURES: 0
LOSS OF CONSCIOUSNESS: 0
WEAKNESS: 1
HEARTBURN: 0
FALLS: 0
MYALGIAS: 0
WHEEZING: 0
DIZZINESS: 0
TINGLING: 0
PHOTOPHOBIA: 0
COUGH: 1
HALLUCINATIONS: 0
STRIDOR: 0
BLOOD IN STOOL: 0
HEADACHES: 0
HEMOPTYSIS: 0
FOCAL WEAKNESS: 0
ORTHOPNEA: 0
EYE PAIN: 0
WEIGHT LOSS: 1
MEMORY LOSS: 1
SPUTUM PRODUCTION: 0
FEVER: 0
BRUISES/BLEEDS EASILY: 1
SHORTNESS OF BREATH: 0
COUGH: 0
VOMITING: 0
CLAUDICATION: 0
ABDOMINAL PAIN: 0
DEPRESSION: 0
PALPITATIONS: 0

## 2018-04-27 ASSESSMENT — PATIENT HEALTH QUESTIONNAIRE - PHQ9
SUM OF ALL RESPONSES TO PHQ9 QUESTIONS 1 AND 2: 0
2. FEELING DOWN, DEPRESSED, IRRITABLE, OR HOPELESS: NOT AT ALL
1. LITTLE INTEREST OR PLEASURE IN DOING THINGS: NOT AT ALL

## 2018-04-27 ASSESSMENT — PAIN SCALES - GENERAL
PAINLEVEL_OUTOF10: 0

## 2018-04-27 NOTE — CARE PLAN
Problem: Respiratory:  Goal: Respiratory status will improve  Outcome: PROGRESSING AS EXPECTED    Intervention: Assess and monitor pulmonary status  Pt's respiratory status assessed. continuous pulse oximetry in use. Head of bed elevated. Collaboration with RT. Ambu bag at bedside. Pt encouraged to use incentive spirometer. Pt encouraged to self suction as needed. Supplemental oxygen in use.       Problem: Mobility  Goal: Risk for activity intolerance will decrease  Outcome: PROGRESSING AS EXPECTED    Intervention: Assess and monitor signs of activity intolerance  Pt assisted with ambulation by staff. Pt encouraged to participate in activity. Activity progressed at pt's own rate. Pt given rest periods as needed.

## 2018-04-27 NOTE — CARE PLAN
Problem: Infection  Goal: Will remain free from infection  Outcome: PROGRESSING AS EXPECTED    Intervention: Implement standard precautions and perform hand washing before and after patient contact  Hand hygiene performed before and after assessing patient. Daily CHG bath completed. Scrub the hub prior to accessing IVs. Linens changed daily and PRN when soiled.       Problem: Skin Integrity  Goal: Risk for impaired skin integrity will decrease  Outcome: PROGRESSING AS EXPECTED    Intervention: Implement precautions to protect skin integrity in collaboration with the interdisciplinary team   04/26/18 0800 04/26/18 1800   OTHER   Skin Preventative Measures Pillows in Use to Float Heels;Pillows in Use for Support / Positioning;Silicone Oxygen Tubing in Use --    Bed Types Low Air Loss Mattress --    Friction Interventions Draw Sheet / Pad Used for Repositioning;Sacral Foam Dressing in Use --    Moisturizers Moisturizer ;Barrier Wipes --    Activity  Bed;Edge of bed;Range of motion;Up to commode --    Patient Turns / Repositioning --  Supine   Assistance / Tolerance for Turning/Repositioning --  Assistance of Two or More   Patient is Receiving Nutrition Nothing by Mouth --    Nutrition Consult Ordered No, Consult has Already been Placed --    Vitamin Therapy in Use No --      Patient turned Q2H. Pillows used for support and repositioning.Draw sheet used to decrease friction. Mepilex in place on sacrum.

## 2018-04-27 NOTE — ASSESSMENT & PLAN NOTE
No signs of active bleeding   Mostly related to liver disease   -Omeprazole for stress ulcer prophylaxis. Has coagulopathy, low platelet count, INR) >1.5, and she is on steroids.    -CTM for signs of bleeding

## 2018-04-27 NOTE — PROGRESS NOTES
[facility] Progress Note      Admit Date: 4/22/2018  ICU Day: 4    Resident(s): Liborio Oliver   Attending: HAZEL AMOR/ Dr. Salazar Ha M.D.     Date & Time:   4/27/2018   9:52 AM       Patient ID:    Name:             Rox Valdze   YOB: 1940  Age:                 77 y.o.  female   MRN:               [id number]    HPI:  77 year old female with PMH ox of Atrial fibrillationon apixaban, moderate alcohol intake, COPD not on home oxygen admitted 4/22/2018 after two days history of fatigue, dizziness, and jaundice, two months of generalized pruritus. Found to be hypotensive, and having BLAYNE, elevated liver enzymes, and bilirubin [, , alk phos 73, total bili 19.2, albumin 2.1, total protein 5.3, ammonia 51, INR 6.03, PTT 53.7, APTT 53]. Creatinine 2.08, GFR 23,  LA 4.4-> 2.3, improved with IV fluid.GI consulted, recommended basic supportive care and to IV Vit K. Infection panels were unremarkable, SUMAN was positive at a titier of 1:80, F actin elevated 40, anti-giancarlo 9.0 (WNL), liver biopsy done 4/27/2018, pending results.     Consultants:  PMA: Jasper Milton   GI: Jose Raul Dexter     Procedures:  CT guided liver biopsy 4/26    Interval Events:    4/27/2018   Tolerated the liver Bx yesterday, back on low dose pressors [Nor epi 3, Vasso 0.03 ml/min] after getting pain medications.  AFIB 's, starting digoxin load, check level tomorrow.     4/27/2018 No acute events overnight A&O x 3 this am - off pressors this monring- continue midodrine and stress-dose steroids, CVP 9-16- A.fib on monitor 's (130's with movement) asymptomatic - adequate urine output, non-bloody bowel movement.      Review of Systems   Constitutional: Negative for chills and fever.   HENT: Negative for sore throat.    Eyes: Negative for photophobia and pain.   Respiratory: Negative for cough, hemoptysis, shortness of breath and stridor.    Cardiovascular: Negative for chest pain and  palpitations.   Gastrointestinal: Negative for abdominal pain, diarrhea and vomiting.   Genitourinary: Negative for urgency.   Musculoskeletal: Negative for falls.   Skin:        Diffuse bruising  upper and lower extremities    Neurological: Negative for seizures and loss of consciousness.        Intermittently confused, after receiving pain medication after liver Bx    Endo/Heme/Allergies: Bruises/bleeds easily.   Psychiatric/Behavioral: Negative for hallucinations.     Physical Exam   Constitutional: She appears well-developed. No distress.   markedly jaundiced    HENT:   Head: Normocephalic and atraumatic.   Eyes: Pupils are equal, round, and reactive to light. Right eye exhibits no discharge. Left eye exhibits no discharge. Scleral icterus is present.   Neck: Normal range of motion. No JVD present.   Right IJ   Cardiovascular: Normal heart sounds.  Exam reveals no gallop and no friction rub.    No murmur heard.  Distant heart sounds, irregular rhythm     Pulmonary/Chest: Effort normal. No stridor. No respiratory distress. She has no wheezes.   Faint bibasal crackles, more on the right    Abdominal: Soft. Bowel sounds are normal. She exhibits no distension. There is no tenderness. There is no rebound and no guarding.   Musculoskeletal: Normal range of motion. She exhibits edema (B/L lower extremity ). She exhibits no tenderness or deformity.   Neurological: She is alert. No cranial nerve deficit.   Skin: Skin is warm and dry. Capillary refill takes less than 2 seconds. Rash: Diffuse bruising and marked jaundice  She is not diaphoretic.   Psychiatric: She has a normal mood and affect. Her behavior is normal.     Respiratory:     Respiration: 18, Pulse Oximetry: (P) 99 %    Chest Tube Drains:  NA        Invalid input(s): [id number]    HemoDynamics:  Pulse: (!) (P) 123, Heart Rate (Monitored): (!) (P) 122 Blood Pressure : (!) 78/47, NIBP: (P) 120/76 CVP (mm Hg): (!) (P) 20 MM HG            Intake/Output Summary  (Last 24 hours) at 18 0952  Last data filed at 18 0600   Gross per 24 hour   Intake           149.98 ml   Output              450 ml   Net          -300.02 ml       Weight: 75.1 kg (165 lb 9.1 oz)  Body mass index is 30.27 kg/m².    Recent Labs      18   SODIUM  132*  136  138   POTASSIUM  4.2  3.9  4.0   CHLORIDE  109  111  111   CO2  17*  20  19*   BUN  35*  39*  45*   CREATININE  1.42*  1.45*  1.45*   CALCIUM  7.7*  8.7  9.1       GI/Nutrition:  Renal/hepatic diet   Recent Labs      18   ALTSGPT  221*  207*  192*   ASTSGOT  325*  217*  174*   ALKPHOSPHAT  60  69  68   TBILIRUBIN  19.2*  21.3*  21.5*   GLUCOSE  165*  136*  129*     Heme:  Platelets decreased, TEG shows decreased reaction time   INR elevated 3.10, improved from admission, H/H stable   Recent Labs      18   RBC  3.04*  2.99*  3.27*   HEMOGLOBIN  10.5*  10.4*  11.3*   HEMATOCRIT  30.5*  29.8*  32.7*   PLATELETCT  100*  89*  100*   PROTHROMBTM  31.7*   --    --    APTT  45.6*   --    --    INR  3.10*   --    --      Infectious Disease:    Ceftriaxone and doxy   Temp  Av.2 °C (97.1 °F)  Min: 35.9 °C (96.6 °F)  Max: 36.4 °C (97.6 °F)  Recent Labs      18   WBC  7.5  7.5  9.2   NEUTSPOLYS  88.90*  88.90*  88.20*   LYMPHOCYTES  3.90*  3.20*  2.80*   MONOCYTES  6.40  7.30  8.20   EOSINOPHILS  0.00  0.00  0.00   BASOPHILS  0.10  0.10  0.10   ASTSGOT  325*  217*  174*   ALTSGPT  221*  207*  192*   ALKPHOSPHAT  60  69  68   TBILIRUBIN  19.2*  21.3*  21.5*       Meds:  • lactulose  30 mL     • doxycycline monohydrate  100 mg     • midodrine  10 mg     • vasopressin (PITRESSIN) infusion  0.03 Units/min 0.03 Units/min (18 0729)   • traZODone  100 mg     • thiamine  100 mg      And   • folic acid  1 mg      And   • multivitamin  1 Tab     • NORepinephrine  (LEVOPHED) infusion  0.5-30 mcg/min 3 mcg/min (04/27/18 0757)   • hydrocortisone sodium succinate PF  50 mg     • Respiratory Care per Protocol       • budesonide-formoterol  2 Puff     • tiotropium  1 Cap        Problem and Plan:  Hepatic failure (CMS-HCC)  Drinks 3 glasses of wine a day for many years.  Jaundice, fatigue, poor PO intake.   No fever, chills, abdominal pain, or signs/symptoms of GI bleeding.   Marked juandice and bruising. Mental status within normal limits.   No asterixis. No hepatic encephalopathy.    Initial labs , , alk phos 73, total bili 19.2, albumin 2.1, total protein 5.3, ammonia 51  INR 6.03, PTT 53.7, APTT 53. Ferritin very high, transferrin low. Acute hepatitis panel negative. HIV negative. TSH WNL.   Hep B Core total antibody, Hep C viral RNA QUALITATIVE,  Hep B surface antibody Neg.   TEG study unremarkable.    SUMAN +, titier 1:80, F actin elevated 40, anti-giancarlo 9.0 (WNL)    Abdominal ultrasound suggests cirrhosis.    MELD score 40, MELD sodium 40, mortality 71.3 %  Child Valenzuela C    - GI onboard, recommended supportive care at this time   - Liver biopsy 4/26/2018, pending results   - slight improvement in AST, ALT with conservative management. T. anni still high  - continue to hold home apixaban due to child valenzuela C and BLAYNE   - renal/hepatic diet.    Acute kidney injury (CMS-HCC)  Poor PO intake, high BUN/Cr ratio, likely from dehydration  Cr 2.08 (baseline Cr 0.85)  BUN/Cr consistent with pre-renal, Improving with re-hydration      Jese 2.3 %, however patient was on diuretics, UA hyaline casts, no active sediment    Renal ultrasound unremarkable     Improving with re-hydration & pressors.   - Cont IV hydration & wean pressor as able.   - holding home spironolactone, losartan  - renally dose meds    Lactic acidosis  LA 4.4, improved to 1.6 with IV fluid.  -Resolved.     Hypotension  On IV fluid and pressor ( levo & vasopressin ).   Unclear cause, probably combination of  sepsis ( ? Lung source), dehydration, liver failure.   -Hold home BP meds [atenolol 50 mg, losartan 25 mg, spironolactone 25 mg]  -Cont Midodrine 5 mg TID, Hydrocortisone, IV fluid and wean pressor as able.      Abnormal chest x-ray  - ? CAP,  unusual wedge-shaped opacification noted in right lower hemithorax adjacent to the right heart border. This could represent collapse of a portion of the right lung vs PNA. Underlying carcinoma is a possibility. Fibrosis pneumonia or loculated fluid are also possibilities. Minimal blunting of costophrenic angles bilaterally.  - received ceftriaxone x1 and doxycycline in ED  - Does not look infectious clinically however given low BP & elevated procalcitonin, there is concern for CAP and hence ceftriaxone & doxycycline has been restarted, will plan for 5 days total, thru 4/27/2018    - supportive IS, O2, RT    COPD (chronic obstructive pulmonary disease) (CMS-HCC)  Not on home oxygen  PFT Aug 2017 Ratio 61% FEV1 63%, DLCO WNL   - O2, RT, continue home spiriva, symbicort    Compression fracture of L1 lumbar vertebra (CMS-HCC)  Moderate anterior wedge compression fracture on X ray  Hx of fall many days ago, patient does not remember when  - Fall precaution    - can use lidocaine patch for pain if needed [avoid NSAIDs]    Atrial fibrillation (CMS-HCC)  Chronic afib, on atenolol 50 mg, currently holding given her hypotension & apixaban at home.     - holding apixaban due to liver failure, elevated INR and BLAYNE  - rate running high 100-130's starting digoxin 4/27/2018     Coagulopathy (CMS-HCC)  No signs of active bleeding  Mostly related to liver disease   CTM    Quality Measures:  Lines:   Right IJ, PIV  Sanchez Catheter: No  DVT Prophylaxis:  SCDs, high risk of bleeding   Ulcer Prophylaxis: N/A  Antibiotics:   Ceftriaxone/ Doxy, thru 4/27/2018     CODE STATUS:  FULL  DISPO:   ICU

## 2018-04-27 NOTE — DOCUMENTATION QUERY
"DOCUMENTATION QUERY    PROVIDERS: Please select “Cosign w/ note”to reply to query.    To better represent the severity of illness of your patient, please review the following information and exercise your independent professional judgment in responding to this query.     \"Sepsis\" is documented in the Progress Notes beginning 4/22/18. Based upon the clinical findings, risk factors, and treatment, please specify if this condition was present on admission or developed after admission    • Sepsis was present on admission.  • Sepsis developed after admission  • Unable to determine    The medical record reflects the following:   Clinical Findings - WBC:  12.7 on admission  - Immature Granulocytes (absolute): 0.14 on admission  - Lactic Acid: 4.4 on admission  - BP: 83/56 on admission  - Temperature: 35.9C on admission  - Procalcitonin: 0.75 on 4/23/18  - \"Undifferentiated shock, likely combined vasodistributive from sepsis plus liver disease\" documented in the Progress Notes starting 4/25/18   Treatment - Adoxa  - Vibramycin  - Rocephin  - Levophed  - Vasopressin  - Albumin  - NS Bolus & Infusion  - CBC / BMP / PRL  - Blood Cx / CXR   Risk Factors - Advanced Age  - dx Acute Liver Failure  - dx BLAYNE  - dx Undifferentiated Shock  - Possible dx PNA  - hx COPD  - dx Chronic AF   Location within medical record  History and Physical, Progress Notes, Lab Results, and MAR.     Thank you,     ASHLYN Caicedo, RN  Clinical Documentation Improvement Specialist  P: [phone]  "

## 2018-04-27 NOTE — THERAPY
Attempted Occupational Therapy Eval. Pt currently sleeping due to drowsiness from meds. Nursing reports pt with near syncope when up to commode. Pt also with heartrate issues. Holding OT eval per nursing request. Will continue to follow and complete as appropriate.

## 2018-04-27 NOTE — PROGRESS NOTES
Pulmonary Critical Care Progress Note        Admit:  4/22/2018    Chief Complaint:  Weakness/fatigue - low Bp/hepatic and renal failure    History of Present Illness:    This is a 77-year-old lady   with an extensive past medical history.  She comes in to the emergency room   complaining of weakness and fatigue.  She has been having problems with   generalized pruritus for quite some time, but it has become worse for the last   several days.  She has been getting dizzy when she stands for the last 3 days   and about 3 days ago her  noted that she was becoming jaundiced.  She   came in to the emergency department.  In the field, her blood pressure was in   60s systolic.  She is receiving intravenous crystalloid solution.     She admits to drinking 3 glasses of white wine a day, which she has done for   many years.  She occasionally will take acetaminophen for pain, but does not   abuse it.  She has no known history of liver disease.    Review of Systems   Constitutional: Positive for malaise/fatigue and weight loss. Negative for chills, diaphoresis and fever.   HENT: Negative for congestion, hearing loss and nosebleeds.    Eyes: Negative.    Respiratory: Positive for cough. Negative for hemoptysis, sputum production, shortness of breath, wheezing and stridor.    Cardiovascular: Positive for leg swelling. Negative for chest pain, palpitations, orthopnea and claudication.   Gastrointestinal: Negative for abdominal pain, blood in stool, heartburn and vomiting.   Genitourinary: Negative.    Musculoskeletal: Negative for myalgias.   Skin:        Persistent jaundice   Neurological: Positive for weakness. Negative for dizziness, tingling, speech change, focal weakness, loss of consciousness and headaches.   Endo/Heme/Allergies: Bruises/bleeds easily.   Psychiatric/Behavioral: Positive for memory loss. Negative for depression. The patient is not nervous/anxious.        Interval Events:  24 hour interval history  reviewed    - tolerated liver biopsy, confused and obtunded after procedure likely secondary to sedatives   - back on levo @ 3, vaso gtts   - A-fib with more tachycardia lately, freq PVCs --> digoxin load   - increased plts to 100   - back on diet   - increased BUN without change in crt   - improving LFTs, stable bili   - completes abx this morning   - serology with acute autimmune hepatitis, ?immunosuppression pending biopsy results    PFSH:  No change.    Respiratory:    2 lpm n/c, BID treatments per home regimen  Pulse Oximetry: 100 %          Exam: unlabored respirations, no intercostal retractions or accessory muscle use and rhonchi bibasilar     ImagingCXR  I have personally reviewed the chest x-ray my impression is  (compared to prior film) none today           Invalid input(s): [id number]    HemoDynamics:  Pulse: (!) 115, Heart Rate (Monitored): (!) 112  Blood Pressure : (!) 78/47, NIBP: (!) 91/60  CVP (mm Hg): 4 MM HG CVP 9-13, norepinephrine and vasopressin infusions    Exam: More tachycardic today, improving lower extremity edema, atrial fibrillation, irregularly irregular rhythm, murmur, tachycardia    Imaging: Available data reviewed    Echocardiogram 4/24: CONCLUSIONS  No prior study is available for comparison.   Left ventricular ejection fraction is visually estimated to be 70%.  Severely dilated left atrium.  Enlarged right atrium.  Estimated right ventricular systolic pressure  is 35 mmHg.        Neuro:  GCS Total [place] Coma Score: 14       Exam: generalized weakness, tired no focal deficits noted mental status intact oriented for age x3 Motor and sensory exam grossly intact  Imaging: Available data reviewed    Fluids:  Intake/Output       04/25/18 0700 - 04/26/18 0659 04/26/18 0700 - 04/27/18 0659 04/27/18 0700 - 04/28/18 0659      0615-8520 4895-5636 Total 2887-6829 5527-6167 Total 1673-8239 9208-2190 Total       Intake    P.O.  460  50 510  --  -- --  --  -- --    P.O. 460 50 510 -- -- --  -- -- --    I.V.  630.4  108 738.4  11.3  100 111.2  --  -- --    Vasopressin Volume 108 102.3 210.3 11.3 67.8 79.1 -- -- --    Norepinephrine Volume 22.4 5.7 28.1 -- 32.2 32.2 -- -- --    IV Volume (Normal Saline) 250 -- 250 -- -- -- -- -- --    IV Volume (< Albumin >) 250 -- 250 -- -- -- -- -- --    Other  --  -- --  --  50 50  --  -- --    Medications (P.O./ Enteral Liquids) -- -- -- -- 50 50 -- -- --    Total Intake 1090.4 158 1248.4 11.3 150 161.2 -- -- --       Output    Urine  400  700 1100  450  0 450  --  -- --    Number of Times Voided -- 4 x 4 x -- 1 x 1 x -- -- --    Number of Times Incontinent of Urine -- -- -- -- 1 x 1 x -- -- --    Urine Void (mL) (non-catheter)  450 0 450 -- -- --    Stool  --  0 0  --  0 0  --  -- --    Number of Times Stooled -- 2 x 2 x 1 x 1 x 2 x -- -- --    Measurable Stool (mL) -- 0 0 -- 0 0 -- -- --    Total Output  450 0 450 -- -- --       Net I/O     690.4 -542 148.4 -438.8 150 -288.8 -- -- --        Weight: 75.1 kg (165 lb 9.1 oz)  Recent Labs      04/25/18   0517  04/26/18   0440  04/27/18   0415   SODIUM  132*  136  138   POTASSIUM  4.2  3.9  4.0   CHLORIDE  109  111  111   CO2  17*  20  19*   BUN  35*  39*  45*   CREATININE  1.42*  1.45*  1.45*   CALCIUM  7.7*  8.7  9.1       GI/Nutrition:  Exam: distended, abdomen is soft and non-tender, normal active bowel sounds, puncture site is clean/dry/intact  Imaging: Available data reviewed    Abdominal ultrasound 4/22:  1.  Lobular liver surface and heterogeneous liver echogenicity are consistent with cirrhosis.    2.  Post cholecystectomy.    3.  No biliary ductal dilatation appreciated.  taking PO     Liver Function  Recent Labs      04/25/18 0517 04/26/18 0440  04/27/18 0415   ALTSGPT  221*  207*  192*   ASTSGOT  325*  217*  174*   ALKPHOSPHAT  60  69  68   TBILIRUBIN  19.2*  21.3*  21.5*   GLUCOSE  165*  136*  129*       Heme:  Recent Labs      04/25/18 0517 04/26/18 0440  04/27/18   0415    RBC  3.04*  2.99*  3.27*   HEMOGLOBIN  10.5*  10.4*  11.3*   HEMATOCRIT  30.5*  29.8*  32.7*   PLATELETCT  100*  89*  100*   PROTHROMBTM  31.7*   --    --    APTT  45.6*   --    --    INR  3.10*   --    --        Infectious Disease:  Temp  Av.2 °C (97.1 °F)  Min: 35.9 °C (96.6 °F)  Max: 36.4 °C (97.6 °F)  Micro: reviewed negative  Serology: Positive smooth muscle antibody, F-actin , SUMAN titer   Recent Labs      18   0517  18   0440  18   0415   WBC  7.5  7.5  9.2   NEUTSPOLYS  88.90*  88.90*  88.20*   LYMPHOCYTES  3.90*  3.20*  2.80*   MONOCYTES  6.40  7.30  8.20   EOSINOPHILS  0.00  0.00  0.00   BASOPHILS  0.10  0.10  0.10   ASTSGOT  325*  217*  174*   ALTSGPT  221*  207*  192*   ALKPHOSPHAT  60  69  68   TBILIRUBIN  19.2*  21.3*  21.5*     Current Facility-Administered Medications   Medication Dose Frequency Provider Last Rate Last Dose   • lactulose 20 GM/30ML solution 30 mL  30 mL TID Marilyn Gupta M.D.   30 mL at 18   • doxycycline monohydrate (ADOXA) tablet 100 mg  100 mg Q12HRS Jeremy M Gonda, M.D.   100 mg at 18   • midodrine (PROAMATINE) tablet 10 mg  10 mg TID WITH MEALS Jeremy M Gonda, M.D.   Stopped at 18   • vasopressin (VASOSTRICT) 20 Units in  mL Infusion  0.03 Units/min Continuous Salazar Ha M.D. 9 mL/hr at 18 0.03 Units/min at 18   • traZODone (DESYREL) tablet 100 mg  100 mg QHS Jasper Torres M.D.   100 mg at 18   • thiamine (THIAMINE) tablet 100 mg  100 mg DAILY Marie Panchal M.D.   Stopped at 18 09    And   • folic acid (FOLVITE) tablet 1 mg  1 mg DAILY Marie Panchal M.D.   Stopped at 18    And   • multivitamin (THERAGRAN) tablet 1 Tab  1 Tab DAILY Marie Panchal M.D.   Stopped at 18   • norepinephrine (LEVOPHED) 8 mg in  mL Infusion  0.5-30 mcg/min Continuous Salazar Ha M.D. 5.6 mL/hr at 18 0729 3 mcg/min at  04/27/18 0729   • hydrocortisone sodium succinate PF (SOLU-CORTEF) 100 MG injection 50 mg  50 mg Q6HRS Marie Panchal M.D.   50 mg at 04/27/18 0617   • Respiratory Care per Protocol   Continuous RT Marie Panchal M.D.       • budesonide-formoterol (SYMBICORT) 160-4.5 MCG/ACT inhaler 2 Puff  2 Puff BID Marie Panchal M.D.   2 Puff at 04/26/18 2030   • tiotropium (SPIRIVA) 18 MCG inhalation capsule 1 Cap  1 Cap DAILY Marie Panchal M.D.   1 Cap at 04/26/18 0915     Last reviewed on 4/22/2018  9:51 PM by Rachna Smith R.N.    Quality  Measures:   Labs reviewed, Radiology images reviewed and Medications reviewed   Sanchez catheter: Critically Ill - Requiring Accurate Measurement of Urinary Output   Central line in place: Need for access, Shock and Vasopressors     DVT Prophylaxis: Contraindicated - High bleeding risk   DVT prophylaxis - mechanical: SCDs   Ulcer prophylaxis: Yes   Antibiotics: Treating active infection/contamination beyond 24 hours perioperative coverage   Assessed for rehab: Patient was assess for and/or received rehabilitation services during this hospitalization      Problems/plan:  Undifferentiated shock, likely combined vasodistributive from sepsis plus liver disease, ?QUAN -unchanged   - Strict blood pressure monitoring with goal MAP greater than 60   -Resume midodrine at current dose, may need to increase   -Euvolemic at this time, avoid additional IV fluids   - Antibiotics/sepsis protocol, follow up on final cultures   - Continue stress dose steroids without weaning yet  Acute liver failure, etiology not entirely clear - ETOH vs autoimmune   - GI following, follow-up on liver biopsy results   -Possible immunosuppression therapy  Acute kidney injury - unchanged   - Avoid nephrotoxins, monitor urine output, maintain perfusion  Hyponatremia - improving    - Monitor  Intravascular volume depletion - resolved S/P resuscitation   - trend CVP  Abnormal chest x-ray  concerning for pneumonia -cultures negative   -Completes antibiotics today  Anemia   - Monitor   - Conservative transfusion strategy  Moderate stage II COPD with an FEV1 of 1.11 liters, 63% of predicted.  There is no acute exacerbation   - O2/RT protocols, bronchodilators as needed  History of systemic arterial hypertension - currently hypotensive   - Continue holding medications  Chronic Atrial fibrillation with worsening RVR   - Rate control, hold on anticoagulants for now, trial digoxin today, check digoxin level in morning  Cor pulmonale  Thrombocytopenia  Prophylaxis, diet    Patient remains critically ill today requiring my active management of her shock, fluid management, liver failure, sepsis resolution.    Discussed patient condition and risk of morbidity and/or mortality with Family, RN, RT, Pharmacy, [facility] resident, Charge nurse / hot rounds, Patient and GI.    The patient remains critically ill.  Critical care time = 32 minutes in directly providing and coordinating critical care and extensive data review.  No time overlap and excludes procedures.

## 2018-04-28 LAB
ALBUMIN SERPL BCP-MCNC: 2 G/DL (ref 3.2–4.9)
ALBUMIN/GLOB SERPL: 0.8 G/DL
ALP SERPL-CCNC: 65 U/L (ref 30–99)
ALT SERPL-CCNC: 149 U/L (ref 2–50)
ANION GAP SERPL CALC-SCNC: 7 MMOL/L (ref 0–11.9)
ANISOCYTOSIS BLD QL SMEAR: ABNORMAL
AST SERPL-CCNC: 117 U/L (ref 12–45)
BACTERIA BLD CULT: NORMAL
BACTERIA BLD CULT: NORMAL
BASOPHILS # BLD AUTO: 0.1 % (ref 0–1.8)
BASOPHILS # BLD: 0.01 K/UL (ref 0–0.12)
BILIRUB SERPL-MCNC: 19.6 MG/DL (ref 0.1–1.5)
BUN SERPL-MCNC: 51 MG/DL (ref 8–22)
BURR CELLS BLD QL SMEAR: NORMAL
CALCIUM SERPL-MCNC: 9 MG/DL (ref 8.5–10.5)
CHLORIDE SERPL-SCNC: 111 MMOL/L (ref 96–112)
CO2 SERPL-SCNC: 21 MMOL/L (ref 20–33)
COMMENT 1642: NORMAL
CREAT SERPL-MCNC: 1.69 MG/DL (ref 0.5–1.4)
DIGOXIN SERPL-MCNC: 0.7 NG/ML (ref 0.8–2)
EOSINOPHIL # BLD AUTO: 0 K/UL (ref 0–0.51)
EOSINOPHIL NFR BLD: 0 % (ref 0–6.9)
ERYTHROCYTE [DISTWIDTH] IN BLOOD BY AUTOMATED COUNT: 67.6 FL (ref 35.9–50)
GLOBULIN SER CALC-MCNC: 2.6 G/DL (ref 1.9–3.5)
GLUCOSE SERPL-MCNC: 136 MG/DL (ref 65–99)
HCT VFR BLD AUTO: 31.3 % (ref 37–47)
HGB BLD-MCNC: 10.8 G/DL (ref 12–16)
IMM GRANULOCYTES # BLD AUTO: 0.11 K/UL (ref 0–0.11)
IMM GRANULOCYTES NFR BLD AUTO: 1.2 % (ref 0–0.9)
LYMPHOCYTES # BLD AUTO: 0.21 K/UL (ref 1–4.8)
LYMPHOCYTES NFR BLD: 2.3 % (ref 22–41)
MACROCYTES BLD QL SMEAR: ABNORMAL
MCH RBC QN AUTO: 34.6 PG (ref 27–33)
MCHC RBC AUTO-ENTMCNC: 34.5 G/DL (ref 33.6–35)
MCV RBC AUTO: 100.3 FL (ref 81.4–97.8)
MONOCYTES # BLD AUTO: 0.63 K/UL (ref 0–0.85)
MONOCYTES NFR BLD AUTO: 7 % (ref 0–13.4)
MORPHOLOGY BLD-IMP: NORMAL
NEUTROPHILS # BLD AUTO: 8.07 K/UL (ref 2–7.15)
NEUTROPHILS NFR BLD: 89.4 % (ref 44–72)
NRBC # BLD AUTO: 0.02 K/UL
NRBC BLD-RTO: 0.2 /100 WBC
OVALOCYTES BLD QL SMEAR: NORMAL
PLATELET # BLD AUTO: 71 K/UL (ref 164–446)
PLATELET BLD QL SMEAR: NORMAL
PMV BLD AUTO: 11 FL (ref 9–12.9)
POIKILOCYTOSIS BLD QL SMEAR: NORMAL
POTASSIUM SERPL-SCNC: 4 MMOL/L (ref 3.6–5.5)
PROT SERPL-MCNC: 4.6 G/DL (ref 6–8.2)
RBC # BLD AUTO: 3.12 M/UL (ref 4.2–5.4)
RBC BLD AUTO: PRESENT
SCHISTOCYTES BLD QL SMEAR: NORMAL
SIGNIFICANT IND 70042: NORMAL
SIGNIFICANT IND 70042: NORMAL
SITE SITE: NORMAL
SITE SITE: NORMAL
SODIUM SERPL-SCNC: 139 MMOL/L (ref 135–145)
SOURCE SOURCE: NORMAL
SOURCE SOURCE: NORMAL
WBC # BLD AUTO: 9 K/UL (ref 4.8–10.8)

## 2018-04-28 PROCEDURE — 700102 HCHG RX REV CODE 250 W/ 637 OVERRIDE(OP): Performed by: HOSPITALIST

## 2018-04-28 PROCEDURE — 85025 COMPLETE CBC W/AUTO DIFF WBC: CPT

## 2018-04-28 PROCEDURE — 700111 HCHG RX REV CODE 636 W/ 250 OVERRIDE (IP): Performed by: INTERNAL MEDICINE

## 2018-04-28 PROCEDURE — A9270 NON-COVERED ITEM OR SERVICE: HCPCS | Performed by: STUDENT IN AN ORGANIZED HEALTH CARE EDUCATION/TRAINING PROGRAM

## 2018-04-28 PROCEDURE — P9047 ALBUMIN (HUMAN), 25%, 50ML: HCPCS | Mod: JG | Performed by: HOSPITALIST

## 2018-04-28 PROCEDURE — 700105 HCHG RX REV CODE 258: Performed by: INTERNAL MEDICINE

## 2018-04-28 PROCEDURE — A9270 NON-COVERED ITEM OR SERVICE: HCPCS | Performed by: HOSPITALIST

## 2018-04-28 PROCEDURE — A9270 NON-COVERED ITEM OR SERVICE: HCPCS | Performed by: INTERNAL MEDICINE

## 2018-04-28 PROCEDURE — 700102 HCHG RX REV CODE 250 W/ 637 OVERRIDE(OP): Performed by: INTERNAL MEDICINE

## 2018-04-28 PROCEDURE — 700111 HCHG RX REV CODE 636 W/ 250 OVERRIDE (IP): Mod: JG | Performed by: HOSPITALIST

## 2018-04-28 PROCEDURE — 700101 HCHG RX REV CODE 250: Performed by: INTERNAL MEDICINE

## 2018-04-28 PROCEDURE — 80162 ASSAY OF DIGOXIN TOTAL: CPT

## 2018-04-28 PROCEDURE — 83516 IMMUNOASSAY NONANTIBODY: CPT

## 2018-04-28 PROCEDURE — 770022 HCHG ROOM/CARE - ICU (200)

## 2018-04-28 PROCEDURE — 80053 COMPREHEN METABOLIC PANEL: CPT

## 2018-04-28 PROCEDURE — 700102 HCHG RX REV CODE 250 W/ 637 OVERRIDE(OP): Performed by: STUDENT IN AN ORGANIZED HEALTH CARE EDUCATION/TRAINING PROGRAM

## 2018-04-28 RX ORDER — DIGOXIN 125 MCG
125 TABLET ORAL DAILY
Status: DISCONTINUED | OUTPATIENT
Start: 2018-04-28 | End: 2018-04-30

## 2018-04-28 RX ORDER — THIAMINE MONONITRATE (VIT B1) 100 MG
100 TABLET ORAL DAILY
Status: DISCONTINUED | OUTPATIENT
Start: 2018-04-28 | End: 2018-04-30

## 2018-04-28 RX ORDER — ALBUMIN (HUMAN) 12.5 G/50ML
25 SOLUTION INTRAVENOUS EVERY 6 HOURS
Status: COMPLETED | OUTPATIENT
Start: 2018-04-28 | End: 2018-04-28

## 2018-04-28 RX ORDER — FOLIC ACID 1 MG/1
1 TABLET ORAL DAILY
Status: DISCONTINUED | OUTPATIENT
Start: 2018-04-28 | End: 2018-04-30

## 2018-04-28 RX ADMIN — MIDODRINE HYDROCHLORIDE 10 MG: 5 TABLET ORAL at 13:20

## 2018-04-28 RX ADMIN — PREDNISOLONE 40 MG: 15 SOLUTION ORAL at 15:46

## 2018-04-28 RX ADMIN — THERA TABS 1 TABLET: TAB at 08:02

## 2018-04-28 RX ADMIN — TIOTROPIUM BROMIDE 1 CAPSULE: 18 CAPSULE ORAL; RESPIRATORY (INHALATION) at 13:21

## 2018-04-28 RX ADMIN — FOLIC ACID 1 MG: 1 TABLET ORAL at 08:02

## 2018-04-28 RX ADMIN — BUDESONIDE AND FORMOTEROL FUMARATE DIHYDRATE 2 PUFF: 160; 4.5 AEROSOL RESPIRATORY (INHALATION) at 20:28

## 2018-04-28 RX ADMIN — MIDODRINE HYDROCHLORIDE 10 MG: 5 TABLET ORAL at 08:02

## 2018-04-28 RX ADMIN — MIDODRINE HYDROCHLORIDE 10 MG: 5 TABLET ORAL at 16:57

## 2018-04-28 RX ADMIN — ALBUMIN (HUMAN) 25 G: 0.25 INJECTION, SOLUTION INTRAVENOUS at 11:17

## 2018-04-28 RX ADMIN — BUDESONIDE AND FORMOTEROL FUMARATE DIHYDRATE 2 PUFF: 160; 4.5 AEROSOL RESPIRATORY (INHALATION) at 11:17

## 2018-04-28 RX ADMIN — LACTULOSE 30 ML: 20 SOLUTION ORAL at 20:28

## 2018-04-28 RX ADMIN — LACTULOSE 30 ML: 20 SOLUTION ORAL at 08:02

## 2018-04-28 RX ADMIN — HYDROCORTISONE SODIUM SUCCINATE 50 MG: 100 INJECTION, POWDER, FOR SOLUTION INTRAMUSCULAR; INTRAVENOUS at 01:13

## 2018-04-28 RX ADMIN — HYDROCORTISONE SODIUM SUCCINATE 50 MG: 100 INJECTION, POWDER, FOR SOLUTION INTRAMUSCULAR; INTRAVENOUS at 04:54

## 2018-04-28 RX ADMIN — DIGOXIN 125 MCG: 125 TABLET ORAL at 10:09

## 2018-04-28 RX ADMIN — LACTULOSE 30 ML: 20 SOLUTION ORAL at 15:49

## 2018-04-28 RX ADMIN — ALBUMIN (HUMAN) 25 G: 0.25 INJECTION, SOLUTION INTRAVENOUS at 16:57

## 2018-04-28 RX ADMIN — NOREPINEPHRINE BITARTRATE 0.5 MCG/MIN: 1 INJECTION INTRAVENOUS at 15:53

## 2018-04-28 RX ADMIN — VASOPRESSIN 0.03 UNITS/MIN: 20 INJECTION INTRAVENOUS at 10:07

## 2018-04-28 RX ADMIN — Medication 100 MG: at 08:02

## 2018-04-28 ASSESSMENT — COPD QUESTIONNAIRES
HAVE YOU SMOKED AT LEAST 100 CIGARETTES IN YOUR ENTIRE LIFE: YES
DO YOU EVER COUGH UP ANY MUCUS OR PHLEGM?: NO/ONLY WITH OCCASIONAL COLDS OR INFECTIONS
DURING THE PAST 4 WEEKS HOW MUCH DID YOU FEEL SHORT OF BREATH: SOME OF THE TIME
COPD SCREENING SCORE: 6

## 2018-04-28 ASSESSMENT — ENCOUNTER SYMPTOMS
FOCAL WEAKNESS: 0
MEMORY LOSS: 0
BRUISES/BLEEDS EASILY: 1
BLOOD IN STOOL: 0
EYE PAIN: 0
STRIDOR: 0
ABDOMINAL PAIN: 0
WEAKNESS: 1
WHEEZING: 0
NERVOUS/ANXIOUS: 0
BLURRED VISION: 0
SEIZURES: 0
HALLUCINATIONS: 0
FEVER: 0
PHOTOPHOBIA: 0
CHILLS: 0
TREMORS: 0
POLYDIPSIA: 0
LOSS OF CONSCIOUSNESS: 0
SPUTUM PRODUCTION: 0
BACK PAIN: 0
HEMOPTYSIS: 0
DEPRESSION: 0
VOMITING: 0
MYALGIAS: 0
CLAUDICATION: 0
SHORTNESS OF BREATH: 0
PND: 0
FALLS: 0
NAUSEA: 1
WEIGHT LOSS: 0
PALPITATIONS: 0
ORTHOPNEA: 0
DIARRHEA: 0
HEADACHES: 0
SPEECH CHANGE: 0
COUGH: 0
SORE THROAT: 0

## 2018-04-28 ASSESSMENT — LIFESTYLE VARIABLES
AVERAGE NUMBER OF DAYS PER WEEK YOU HAVE A DRINK CONTAINING ALCOHOL: 7
ON A TYPICAL DAY WHEN YOU DRINK ALCOHOL HOW MANY DRINKS DO YOU HAVE: 4
TOTAL SCORE: 4
CONSUMPTION TOTAL: POSITIVE
DOES PATIENT WANT TO STOP DRINKING: YES
EVER FELT BAD OR GUILTY ABOUT YOUR DRINKING: YES
HOW MANY TIMES IN THE PAST YEAR HAVE YOU HAD 5 OR MORE DRINKS IN A DAY: 365
TOTAL SCORE: 4
DO YOU DRINK ALCOHOL: YES
TOTAL SCORE: 4
HAVE PEOPLE ANNOYED YOU BY CRITICIZING YOUR DRINKING: YES
HAVE YOU EVER FELT YOU SHOULD CUT DOWN ON YOUR DRINKING: YES
EVER HAD A DRINK FIRST THING IN THE MORNING TO STEADY YOUR NERVES TO GET RID OF A HANGOVER: YES
DOES PATIENT WANT TO TALK TO SOMEONE ABOUT QUITTING: NO

## 2018-04-28 ASSESSMENT — PATIENT HEALTH QUESTIONNAIRE - PHQ9
2. FEELING DOWN, DEPRESSED, IRRITABLE, OR HOPELESS: NOT AT ALL
SUM OF ALL RESPONSES TO PHQ9 QUESTIONS 1 AND 2: 0
1. LITTLE INTEREST OR PLEASURE IN DOING THINGS: NOT AT ALL

## 2018-04-28 ASSESSMENT — PAIN SCALES - GENERAL
PAINLEVEL_OUTOF10: 0

## 2018-04-28 NOTE — CARE PLAN
Problem: Infection  Goal: Will remain free from infection  Outcome: PROGRESSING AS EXPECTED    Intervention: Implement standard precautions and perform hand washing before and after patient contact  Hand hygiene performed before and after assessing patient. Daily CHG bath completed. Scrub the hub prior to accessing central lines. Linens changed daily and PRN when soiled.       Problem: Skin Integrity  Goal: Risk for impaired skin integrity will decrease  Outcome: PROGRESSING AS EXPECTED    Intervention: Implement precautions to protect skin integrity in collaboration with the interdisciplinary team   04/27/18 0800 04/28/18 0000   OTHER   Skin Preventative Measures Pillows in Use to Float Heels;Pillows in Use for Support / Positioning;Silicone Oxygen Tubing in Use --    Bed Types Low Air Loss Mattress --    Friction Interventions Draw Sheet / Pad Used for Repositioning;Sacral Foam Dressing in Use --    Moisturizers Moisturizer ;Barrier Wipes --    Activity  Bed;Edge of bed;Up to commode --    Patient Turns / Repositioning --  Right Side   Assistance / Tolerance for Turning/Repositioning --  Assistance of One   Patient is Receiving Nutrition Oral Intake Adequate --    Nutrition Consult Ordered No, Consult has Already been Placed --    Vitamin Therapy in Use No --      Patient turned Q2H. Pillows used for support and repositioning.Draw sheet used to decrease friction. Mepilex in place on sacrum.

## 2018-04-28 NOTE — PROGRESS NOTES
Dr. Avery notified of bladder scan result of 640 mL and patient's inability to void on bedpan. New orders received for coude schmidt catheter insertion. Ordered implemented by RN.

## 2018-04-28 NOTE — CARE PLAN
Problem: Communication  Goal: The ability to communicate needs accurately and effectively will improve  Outcome: PROGRESSING AS EXPECTED      Problem: Venous Thromboembolism (VTW)/Deep Vein Thrombosis (DVT) Prevention:  Goal: Patient will participate in Venous Thrombosis (VTE)/Deep Vein Thrombosis (DVT)Prevention Measures  Outcome: PROGRESSING AS EXPECTED      Problem: Pain Management  Goal: Pain level will decrease to patient's comfort goal  Outcome: PROGRESSING AS EXPECTED

## 2018-04-28 NOTE — RESPIRATORY CARE
COPD EDUCATION by COPD CLINICAL EDUCATOR  4/28/2018 at 10:42 AM by Nora Ford     Patient reviewed by COPD education team. Patient does not qualify for COPD program.

## 2018-04-28 NOTE — PROGRESS NOTES
Gastroenterology Progress Note     Author: Mike Alejandro   Date & Time Created: 4/28/2018 9:22 AM    Chief Complaint:  Jaundice      Interval History:  Pt did okay overnight. Denies fever, chills, chest pain, SOB, abdominal pain, N/V/D. Pt is alert and oriented. Spoke to family members as well as the resident.     Review of Systems:  ROS    Physical Exam:  Physical Exam   Constitutional: She is oriented to person, place, and time.   Acutely ill, markedly jaundiced    Eyes: Scleral icterus is present.   Neck: Normal range of motion.   Cardiovascular: Normal heart sounds.    Pulmonary/Chest: Effort normal and breath sounds normal. No respiratory distress.   Abdominal: Soft. She exhibits no distension. There is no tenderness. There is no rebound and no guarding.   Musculoskeletal: Normal range of motion.   Neurological: She is alert and oriented to person, place, and time.   Skin: Skin is warm.   jaundiced   Psychiatric: Her behavior is normal.       Labs:        Invalid input(s): [id number]      Recent Labs      04/26/18 0440 04/27/18 0415 04/28/18   0510   SODIUM  136  138  139   POTASSIUM  3.9  4.0  4.0   CHLORIDE  111  111  111   CO2  20  19*  21   BUN  39*  45*  51*   CREATININE  1.45*  1.45*  1.69*   CALCIUM  8.7  9.1  9.0     Recent Labs      04/26/18 0440 04/27/18 0415 04/28/18   0510   ALTSGPT  207*  192*  149*   ASTSGOT  217*  174*  117*   ALKPHOSPHAT  69  68  65   TBILIRUBIN  21.3*  21.5*  19.6*   GLUCOSE  136*  129*  136*     Recent Labs      04/26/18 0440 04/27/18 0415  04/28/18   0510   RBC  2.99*  3.27*  3.12*   HEMOGLOBIN  10.4*  11.3*  10.8*   HEMATOCRIT  29.8*  32.7*  31.3*   PLATELETCT  89*  100*  71*     Recent Labs      04/26/18 0440 04/27/18 0415 04/28/18   0510   WBC  7.5  9.2  9.0   NEUTSPOLYS  88.90*  88.20*  89.40*   LYMPHOCYTES  3.20*  2.80*  2.30*   MONOCYTES  7.30  8.20  7.00   EOSINOPHILS  0.00  0.00  0.00   BASOPHILS  0.10  0.10  0.10   ASTSGOT  217*  174*  117*    ALTSGPT  207*  192*  149*   ALKPHOSPHAT  69  68  65   TBILIRUBIN  21.3*  21.5*  19.6*     Hemodynamics:  Temp (24hrs), Av.3 °C (97.3 °F), Min:35.9 °C (96.7 °F), Max:37.2 °C (98.9 °F)  Temperature: 36.1 °C (97 °F)  Pulse  Av.5  Min: 42  Max: 138Heart Rate (Monitored): (!) 111  NIBP: 119/72  CVP (mm Hg): 6 MM HG  Respiratory:    Respiration: 18, Pulse Oximetry: 95 %        RUL Breath Sounds: Diminished, RML Breath Sounds: Diminished, RLL Breath Sounds: Diminished, ERICK Breath Sounds: Diminished, LLL Breath Sounds: Diminished  Fluids:    Intake/Output Summary (Last 24 hours) at 18 0922  Last data filed at 18 0800   Gross per 24 hour   Intake           797.74 ml   Output             1785 ml   Net          -987.26 ml     Weight: 73 kg (160 lb 15 oz)  GI/Nutrition:  Orders Placed This Encounter   Procedures   • DIET ORDER     Standing Status:   Standing     Number of Occurrences:   1     Order Specific Question:   Diet:     Answer:   Renal [8]     Order Specific Question:   Diet:     Answer:   Hepatic [9]     Medical Decision Making, by Problem:  Active Hospital Problems    Diagnosis   • *Hepatic failure (HCC) [K72.90]   • Acute kidney injury (HCC) [N17.9]   • Lactic acidosis [E87.2]   • Hypotension [I95.9]   • Abnormal chest x-ray [R93.8]   • Atrial fibrillation (HCC) [I48.91]   • Compression fracture of L1 lumbar vertebra (HCC) [S32.010A]   • COPD (chronic obstructive pulmonary disease) (HCC) [J44.9]   • Anemia [D64.9]   • Coagulopathy (HCC) [D68.9]       Plan:  1.  EtOH hepatitis - MELD Na 35. 90 d mortality rate 66%. Recommend daily PT/INR, CMP, CBC to follow MELD serially. 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 Spoke to Dr Higgins of [facility] - transplant hepatology, he agrees, only steroids if MDF >32, for EtOH hepatitis, not for AIH. Biopsies not diagnostic of AIH. Poor transplant candidate because of age & recent EtOH use. MDF certainly >32; if no signs of infection,  okay to use Prednisolone. Watch kidney function. Will follow along.   2. Transaminitis - see above  3. Acute liver failure - see above   4. Hyperbilirubinemia - see above  5. Coagulopathy - see above       Quality-Core Measures

## 2018-04-28 NOTE — PROGRESS NOTES
[facility] ICU Progress Note      Admit Date: 4/22/2018  ICU Day: 4    Resident(s): Liborio Oliver   Attending: HAZEL AMOR/ Dr. Salazar Ha // Dr. GONDA, JEREMY M    Date & Time:   4/28/2018   1:17 PM       Patient ID:    Name:             Rox Valdez   YOB: 1940  Age:                 77 y.o.  female   MRN:               [id number]    HPI:  77 year old female with PMH ox of Atrial fibrillationon apixaban, moderate alcohol intake, COPD not on home oxygen admitted 4/22/2018 after two days history of fatigue, dizziness, and jaundice, two months of generalized pruritus. Found to be hypotensive, and having BLAYNE, elevated liver enzymes, and bilirubin [, , alk phos 73, total bili 19.2, albumin 2.1, total protein 5.3, ammonia 51, INR 6.03, PTT 53.7, APTT 53]. Creatinine 2.08, GFR 23,  LA 4.4-> 2.3, improved with IV fluid.GI consulted, recommended basic supportive care and to IV Vit K. Infection panels were unremarkable, SUMAN was positive at a titier of 1:80, F actin elevated 40, anti-giancarlo 9.0 (WNL), liver biopsy done 4/27/2018, GI following appreciate recommendations    Consultants:  PMA: Jasper Milton   GI: Jose Raul Dexter     Procedures:  CT guided liver biopsy 4/26    Interval Events:    4/28/2018   More alert today, AFIB 90s-130's  Cont digoxin 250 micros PO, monitor level given her renal function, will give her albumin 25 gm X 2 to improve her intravascular volume.    GI thinks this is more with Severe alcoholic hepatitis (Maddrey Discriminant Function [MDF] score ?32).   Starting 4/28/2018 prednisolone PO 40 mg daily for 28 days, to be followed by a 2-week taper.  Consulting palliative care for advanced care planning.    4/27/2018 Tolerated the liver Bx yesterday, back on low dose pressors [Nor epi 3, Vasso 0.03 ml/min] after getting pain medications. AFIB 's, starting digoxin load, check level tomorrow.   4/27/2018 No acute events overnight A&O x 3 this am -  off pressors this monring- continue midodrine and stress-dose steroids, CVP 9-16- A.fib on monitor 's (130's with movement) asymptomatic - adequate urine output, non-bloody bowel movement.      Review of Systems   Constitutional: Negative for chills and fever.   HENT: Negative for sore throat.    Eyes: Negative for photophobia and pain.   Respiratory: Negative for cough, hemoptysis, shortness of breath and stridor.    Cardiovascular: Negative for chest pain and palpitations.   Gastrointestinal: Negative for abdominal pain, diarrhea and vomiting.   Genitourinary: Negative for urgency.   Musculoskeletal: Negative for falls.   Skin:        Diffuse bruising  upper and lower extremities    Neurological: Negative for seizures and loss of consciousness.        Intermittently confused, after receiving pain medication after liver Bx    Endo/Heme/Allergies: Bruises/bleeds easily.   Psychiatric/Behavioral: Negative for hallucinations.     Physical Exam   Constitutional: She is oriented to person, place, and time. She appears well-developed. No distress.   markedly jaundiced    HENT:   Head: Normocephalic and atraumatic.   Eyes: Pupils are equal, round, and reactive to light. Right eye exhibits no discharge. Left eye exhibits no discharge. Scleral icterus is present.   Neck: Normal range of motion. No JVD present.   Right IJ   Cardiovascular: Normal heart sounds.  Exam reveals no gallop and no friction rub.    No murmur heard.  Distant heart sounds, irregular rhythm     Pulmonary/Chest: Effort normal. No stridor. No respiratory distress. She has no wheezes.   Faint bibasal crackles, more on the right    Abdominal: Soft. Bowel sounds are normal. She exhibits no distension. There is no tenderness. There is no rebound and no guarding.   Musculoskeletal: Normal range of motion. She exhibits edema (B/L lower extremity ). She exhibits no tenderness or deformity.   Neurological: She is alert and oriented to person, place, and  time. No cranial nerve deficit.   Skin: Skin is warm and dry. Capillary refill takes less than 2 seconds. Rash: Diffuse bruising and marked jaundice  She is not diaphoretic.   Psychiatric: She has a normal mood and affect. Her behavior is normal.     Respiratory:     Respiration: 18, Pulse Oximetry: 96 %    Chest Tube Drains:  NA        Invalid input(s): [id number]    HemoDynamics:  Pulse: 100, Heart Rate (Monitored): 97 Blood Pressure : 115/61, NIBP: 115/61 CVP (mm Hg): 4 MM HG      Date 04/28/18 0700 - 04/29/18 0659   Shift 4949-8856 8507-2248 5223-5939 24 Hour Total   I  N  T  A  K  E   P.O. 100   100      P.O. 100   100    I.V. 43.6   43.6      Vasopressin Volume 36   36      Norepinephrine Volume 7.6   7.6    Shift Total 143.6   143.6   O  U  T  P  U  T   Urine 110   110      Output (mL) (Urinary Catheter Coudé;Indwelling Catheter 12F) 110   110    Stool          Number of Times Stooled 0 x   0 x    Shift Total 110   110   NET 33.6   33.6          Intake/Output Summary (Last 24 hours) at 04/28/18 1317  Last data filed at 04/28/18 1000   Gross per 24 hour   Intake           542.94 ml   Output             1460 ml   Net          -917.06 ml       Weight: 73 kg (160 lb 15 oz)  Body mass index is 29.43 kg/m².    Recent Labs      04/26/18   0440  04/27/18   0415  04/28/18   0510   SODIUM  136  138  139   POTASSIUM  3.9  4.0  4.0   CHLORIDE  111  111  111   CO2  20  19*  21   BUN  39*  45*  51*   CREATININE  1.45*  1.45*  1.69*   CALCIUM  8.7  9.1  9.0       GI/Nutrition:  Renal/hepatic diet   Recent Labs      04/26/18   0440  04/27/18   0415  04/28/18   0510   ALTSGPT  207*  192*  149*   ASTSGOT  217*  174*  117*   ALKPHOSPHAT  69  68  65   TBILIRUBIN  21.3*  21.5*  19.6*   GLUCOSE  136*  129*  136*     Heme:  Platelets decreased, TEG shows decreased reaction time   INR elevated 3.10, improved from admission, H/H stable   Recent Labs      04/26/18   0440  04/27/18   0415  04/28/18   0510   RBC  2.99*  3.27*  3.12*    HEMOGLOBIN  10.4*  11.3*  10.8*   HEMATOCRIT  29.8*  32.7*  31.3*   PLATELETCT  89*  100*  71*     Infectious Disease:    Ceftriaxone and doxy   Temp  Av.3 °C (97.4 °F)  Min: 35.9 °C (96.7 °F)  Max: 37.2 °C (98.9 °F)  Recent Labs      18   0440  18   0415  18   0510   WBC  7.5  9.2  9.0   NEUTSPOLYS  88.90*  88.20*  89.40*   LYMPHOCYTES  3.20*  2.80*  2.30*   MONOCYTES  7.30  8.20  7.00   EOSINOPHILS  0.00  0.00  0.00   BASOPHILS  0.10  0.10  0.10   ASTSGOT  217*  174*  117*   ALTSGPT  207*  192*  149*   ALKPHOSPHAT  69  68  65   TBILIRUBIN  21.3*  21.5*  19.6*       Meds:  • thiamine  100 mg      And   • folic acid  1 mg      And   • multivitamin  1 Tab     • digoxin  125 mcg     • albumin human 25%  25 g 25 g (18 1117)   • prednisoLONE  40 mg     • lactulose  30 mL     • midodrine  10 mg     • vasopressin (PITRESSIN) infusion  0.03 Units/min 0.03 Units/min (18 1007)   • traZODone  100 mg     • NORepinephrine (LEVOPHED) infusion  0.5-30 mcg/min 1 mcg/min (18 0200)   • Respiratory Care per Protocol       • budesonide-formoterol  2 Puff     • tiotropium  1 Cap        Problem and Plan:  Hepatic failure (HCC)  Drinks 3 glasses of wine a day for many years.  Jaundice, fatigue, poor PO intake.   No fever, chills, abdominal pain, or signs/symptoms of GI bleeding.   Marked juandice and bruising. Mental status within normal limits.   No asterixis. No hepatic encephalopathy.    Initial labs , , alk phos 73, total bili 19.2, albumin 2.1, total protein 5.3, ammonia 51  INR 6.03, PTT 53.7, APTT 53. Ferritin very high, transferrin low. Acute hepatitis panel negative. HIV negative. TSH WNL.   Hep B Core total antibody, Hep C viral RNA QUALITATIVE,  Hep B surface antibody Neg.   TEG study unremarkable.  anti-giancarlo 9.0 (WNL)     SUMAN titier [1:80] and F-actin antibody [40], and smooth muscle AB are Positive consistent with autoimmune hepatitis [GI think this is false positive].  However liver biopsies do not show significantly increased numbers of plasma cells which can be associated with autoimmune hepatitis. [Path comment: Liver Bx: rule out alcoholic hepatitis withassociated cirrhosis. ]    Abdominal ultrasound suggests cirrhosis.    MELD score 40, MELD sodium 40, mortality 71.3 %  Child Valenzuela C  GI thinks this is more with Severe alcoholic hepatitis rather than autoimmune hepatitis     - slight improvement in AST, ALT with conservative management. T. anni still high  - continue to hold home apixaban due to child valenzuela C and BLAYNE   - Renal/hepatic diet.  - GI thinks this is more with Severe alcoholic hepatitis (Maddrey Discriminant Function [MDF] score ?32).   - Starting 4/28/2018 prednisolone PO 40 mg daily for 28 days, to be followed by a 2-week taper.    Acute kidney injury (HCC)  Poor PO intake, high BUN/Cr ratio, likely from dehydration.   Cr 2.08 (baseline Cr 0.85).    BUN/Cr consistent with pre-renal, Improving with re-hydration      Jese 2.3 %, however patient was on diuretics, UA hyaline casts, no active sediment    Renal ultrasound unremarkable.       Improving with re-hydration & pressors.   - Cont IV hydration & wean pressor as able.   - holding home spironolactone, losartan.   - renally dose meds.      Lactic acidosis  LA 4.4, improved to 1.6 with IV fluid.  -Resolved.       Hypotension  On IV fluid and pressor ( levo & vasopressin ).   Relative adrenal insufficiency, dehydration, liver failure.   -Hold home BP meds [atenolol 50 mg, losartan 25 mg, spironolactone 25 mg]  -Cont Midodrine, Hydrocortisone, IV fluid and wean pressor as able.      - Cont digoxin 250 micros PO, monitor level given her renal function  - Will give her albumin 25 gm X 2 doses to improve her intravascular volume.      Abnormal chest x-ray  - ? CAP,  unusual wedge-shaped opacification noted in right lower hemithorax adjacent to the right heart border. This could represent collapse of a portion of the right  lung vs PNA. Underlying carcinoma is a possibility. Fibrosis pneumonia or loculated fluid are also possibilities. Minimal blunting of costophrenic angles bilaterally.   - received ceftriaxone x1 and doxycycline in ED  - Does not look infectious clinically however given low BP & elevated procalcitonin, there is concern for CAP and hence ceftriaxone & doxycycline has been restarted, will plan for 5 days total, thru 4/27/2018    - supportive IS, O2, RT    COPD (chronic obstructive pulmonary disease) (HCC)  Not on home oxygen  PFT Aug 2017 Ratio 61% FEV1 63%, DLCO WNL   - O2, RT, continue home spiriva, symbicort     Compression fracture of L1 lumbar vertebra (HCC)  Moderate anterior wedge compression fracture on X ray  Hx of fall many days ago, patient does not remember when  - Fall precaution     - can use lidocaine patch for pain if needed [avoid NSAIDs]     Atrial fibrillation (HCC)  Chronic afib, on atenolol 50 mg, currently holding given her hypotension & apixaban at home.     - holding apixaban due to liver failure, elevated INR and BLAYNE  - Cont digoxin 250 micros PO, monitor level given her renal function.     Coagulopathy (HCC)  No signs of active bleeding  Mostly related to liver disease   CTM     Quality Measures:  Lines:   Right IJ, PIV  Sanchez Catheter: No  DVT Prophylaxis:  SCDs, high risk of bleeding   Ulcer Prophylaxis: N/A  Antibiotics:   s/p Ceftriaxone/ Doxy, thru 4/27/2018     CODE STATUS:  FULL  DISPO:   ICU

## 2018-04-28 NOTE — PROGRESS NOTES
Pulmonary Critical Care Progress Note        Admit:  4/22/2018    Chief Complaint:  Weakness/fatigue - low Bp/hepatic and renal failure    History of Present Illness:    This is a 77-year-old lady   with an extensive past medical history.  She comes in to the emergency room   complaining of weakness and fatigue.  She has been having problems with   generalized pruritus for quite some time, but it has become worse for the last   several days.  She has been getting dizzy when she stands for the last 3 days   and about 3 days ago her  noted that she was becoming jaundiced.  She   came in to the emergency department.  In the field, her blood pressure was in   60s systolic.  She is receiving intravenous crystalloid solution.     She admits to drinking 3 glasses of white wine a day, which she has done for   many years.  She occasionally will take acetaminophen for pain, but does not   abuse it.  She has no known history of liver disease.    Review of Systems   Constitutional: Positive for malaise/fatigue. Negative for fever and weight loss.   HENT: Negative for congestion and sore throat.    Eyes: Negative for blurred vision.   Respiratory: Negative for cough, hemoptysis, sputum production, shortness of breath, wheezing and stridor.    Cardiovascular: Positive for leg swelling. Negative for chest pain, palpitations, orthopnea, claudication and PND.   Gastrointestinal: Positive for nausea. Negative for abdominal pain, blood in stool, diarrhea and vomiting.   Genitourinary: Negative for dysuria and urgency.   Musculoskeletal: Negative for back pain and myalgias.   Skin: Negative for rash.        Persistent jaundice   Neurological: Positive for weakness. Negative for tremors, speech change, focal weakness, loss of consciousness and headaches.   Endo/Heme/Allergies: Negative for polydipsia. Bruises/bleeds easily.   Psychiatric/Behavioral: Negative for depression and memory loss. The patient is not nervous/anxious.         Interval Events:  24 hour interval history reviewed    - CVP 2-11   - drop in plts to 71k   - on levo @ 1, vaso @ 0.03, lower target to MAP>55   - AAOx3, intermittent confusion   - Afib with improved HR control   - worsening anasarca   - 12F coude' schmidt placed for urinary retention   - increase in BUN/crt   - improving LFTs   - digoxin level low --> re-bolus   - mobilized with A-fib with RVR, pale and near-syncope   - GI requesting daily INR   - Hgb stable    PFSH:  No change.    Respiratory:    2 lpm n/c, BID treatments per home regimen, IS 1200mL  Pulse Oximetry: 97 %          Exam: unlabored respirations, no intercostal retractions or accessory muscle use and rales bibasilar     ImagingCXR  I have personally reviewed the chest x-ray my impression is  (compared to prior film) none again today           Invalid input(s): [id number]    HemoDynamics:  Pulse: 76, Heart Rate (Monitored): (!) 114  NIBP: 111/75  CVP (mm Hg): 3 MM HG CVP 2-11, norepinephrine and vasopressin infusions    Exam: Improving tachycardia, 1+ lower extremity edema, atrial fibrillation, irregularly irregular rhythm, murmur, tachycardia, weeping extremities    Imaging: Available data reviewed    Echocardiogram 4/24: CONCLUSIONS  No prior study is available for comparison.   Left ventricular ejection fraction is visually estimated to be 70%.  Severely dilated left atrium.  Enlarged right atrium.  Estimated right ventricular systolic pressure  is 35 mmHg.        Neuro:  GCS Total Christy Coma Score: 15       Exam: generalized weakness, tired no focal deficits noted mental status intact oriented for age x3 Motor and sensory exam grossly intact, no tremor  Imaging: Available data reviewed    Fluids:  Intake/Output       04/26/18 0700 - 04/27/18 0659 04/27/18 0700 - 04/28/18 0659 04/28/18 0700 - 04/29/18 0659      [id number] 5231-7469 Total 3764-7302 0231-6659 Total 7683-4734 2880-6695 Total       Intake    P.O.  --  -- --  460  100 560  --   -- --    P.O. -- -- -- 460 100 560 -- -- --    I.V.  11.3  100 111.2  168.1  118.9 286.9  --  -- --    Vasopressin Volume 11.3 67.8 79.1 108 108 216 -- -- --    Norepinephrine Volume -- 32.2 32.2 60.1 10.9 70.9 -- -- --    Other  --  50 50  --  -- --  --  -- --    Medications (P.O./ Enteral Liquids) -- 50 50 -- -- -- -- -- --    Total Intake 11.3 150 161.2 628.1 218.9 846.9 -- -- --       Output    Urine  450  0 450  400  1350 1750  --  -- --    Number of Times Voided -- 1 x 1 x -- 0 x 0 x -- -- --    Number of Times Incontinent of Urine -- 1 x 1 x -- 0 x 0 x -- -- --    Urine Void (mL) (non-catheter) 450 0 450 400 0 400 -- -- --    Output (mL) (Urinary Catheter Coudé;Indwelling Catheter 12F) -- -- -- -- 1350 1350 -- -- --    Stool  --  0 0  --  0 0  --  -- --    Number of Times Stooled 1 x 1 x 2 x 1 x 2 x 3 x -- -- --    Measurable Stool (mL) -- 0 0 -- 0 0 -- -- --    Total Output 450 0  1750 -- -- --       Net I/O     -438.8 150 -288.8 228.1 -1131.1 -903.1 -- -- --        Weight: 73 kg (160 lb 15 oz)  Recent Labs      04/26/18   0440  04/27/18   0415  04/28/18   0510   SODIUM  136  138  139   POTASSIUM  3.9  4.0  4.0   CHLORIDE  111  111  111   CO2  20  19*  21   BUN  39*  45*  51*   CREATININE  1.45*  1.45*  1.69*   CALCIUM  8.7  9.1  9.0       GI/Nutrition:  Exam: distended, abdomen is soft and non-tender, normal active bowel sounds, without rebound  Imaging: Available data reviewed    Abdominal ultrasound 4/22:  1.  Lobular liver surface and heterogeneous liver echogenicity are consistent with cirrhosis.    2.  Post cholecystectomy.    3.  No biliary ductal dilatation appreciated.  taking PO     Liver Function  Recent Labs      04/26/18 0440  04/27/18 0415  04/28/18   0510   ALTSGPT  207*  192*  149*   ASTSGOT  217*  174*  117*   ALKPHOSPHAT  69  68  65   TBILIRUBIN  21.3*  21.5*  19.6*   GLUCOSE  136*  129*  136*       Heme:  Recent Labs      04/26/18 0440  04/27/18   0415  04/28/18   0510    RBC  2.99*  3.27*  3.12*   HEMOGLOBIN  10.4*  11.3*  10.8*   HEMATOCRIT  29.8*  32.7*  31.3*   PLATELETCT  89*  100*  71*       Infectious Disease:  Temp  Av.3 °C (97.4 °F)  Min: 35.9 °C (96.7 °F)  Max: 37.2 °C (98.9 °F)  Micro: reviewed negative  Serology: Positive smooth muscle antibody, F-actin , SUMAN titer   Cytology: Cirrhotic liver with fibrosis, no hemosiderin, no malignancy  Recent Labs      18   0440  18   0415  18   0510   WBC  7.5  9.2  9.0   NEUTSPOLYS  88.90*  88.20*  89.40*   LYMPHOCYTES  3.20*  2.80*  2.30*   MONOCYTES  7.30  8.20  7.00   EOSINOPHILS  0.00  0.00  0.00   BASOPHILS  0.10  0.10  0.10   ASTSGOT  217*  174*  117*   ALTSGPT  207*  192*  149*   ALKPHOSPHAT  69  68  65   TBILIRUBIN  21.3*  21.5*  19.6*     Current Facility-Administered Medications   Medication Dose Frequency Provider Last Rate Last Dose   • thiamine tablet 100 mg  100 mg DAILY Liborio Oliver M.D.        And   • folic acid (FOLVITE) tablet 1 mg  1 mg DAILY Liborio Oliver M.D.        And   • multivitamin (THERAGRAN) tablet 1 Tab  1 Tab DAILY Liborio Oliver M.D.       • lactulose 20 GM/30ML solution 30 mL  30 mL TID Marilyn Gupta M.D.   30 mL at 18   • midodrine (PROAMATINE) tablet 10 mg  10 mg TID WITH MEALS Jeremy M Gonda, M.D.   10 mg at 18 173   • vasopressin (VASOSTRICT) 20 Units in  mL Infusion  0.03 Units/min Continuous Salazar Ha M.D. 9 mL/hr at 18 0.03 Units/min at 18   • traZODone (DESYREL) tablet 100 mg  100 mg QHS Jasper Torres M.D.   Stopped at 18 2100   • thiamine (THIAMINE) tablet 100 mg  100 mg DAILY Marie Panchal M.D.   100 mg at 18 08    And   • folic acid (FOLVITE) tablet 1 mg  1 mg DAILY Marie Panchal M.D.   1 mg at 18 0809    And   • multivitamin (THERAGRAN) tablet 1 Tab  1 Tab DAILY Marie Panchal M.D.   1 Tab at 18 08   • norepinephrine (LEVOPHED) 8 mg in NS  250 mL Infusion  0.5-30 mcg/min Continuous Salazar Ha M.D. 1.9 mL/hr at 04/28/18 0200 1 mcg/min at 04/28/18 0200   • hydrocortisone sodium succinate PF (SOLU-CORTEF) 100 MG injection 50 mg  50 mg Q6HRS Marie Panchal M.D.   50 mg at 04/28/18 0454   • Respiratory Care per Protocol   Continuous RT Marie Panchal M.D.       • budesonide-formoterol (SYMBICORT) 160-4.5 MCG/ACT inhaler 2 Puff  2 Puff BID Marie Panchal M.D.   2 Puff at 04/27/18 2149   • tiotropium (SPIRIVA) 18 MCG inhalation capsule 1 Cap  1 Cap DAILY Marie Panchal M.D.   1 Cap at 04/27/18 0809     Last reviewed on 4/22/2018  9:51 PM by Rachna Smith R.N.    Quality  Measures:  Labs reviewed, Radiology images reviewed and Medications reviewed  Sanchez catheter: Urinary Tract Retention or Urinary Tract Obstruction  Central line in place: Need for access, Shock and Vasopressors    DVT Prophylaxis: Contraindicated - High bleeding risk  DVT prophylaxis - mechanical: SCDs  Ulcer prophylaxis: Yes  Antibiotics: Treating active infection/contamination beyond 24 hours perioperative coverage  Assessed for rehab: Patient was assess for and/or received rehabilitation services during this hospitalization      Problems/plan:  Undifferentiated shock, likely combined vasodistributive from sepsis plus liver disease, ?QUAN -persistent today   - Decrease goal MAP greater than 55   - Continue  midodrine at current dose   - Hypovolemic again due to third spacing --> albumin infusions ×2 today, avoid crystalloid for now   - s/p Antibiotics/sepsis protocol   - Continue stress dose steroids without weaning yet  Acute on chronic liver failure, etiology not entirely clear - ETOH vs autoimmune   - GI following, elevated discriminatory function --> change steroids to prednisolone   - Biopsy with cirrhosis   - Poor prognosis, palliative care consultation  Acute kidney injury -worsening   - Avoid nephrotoxins, monitor urine output, maintain  perfusion  Hyponatremia - improved  Intravascular volume depletion - resolved S/P resuscitation   - trend CVP, albumin today  Abnormal chest x-ray concerning for pneumonia -cultures negative   -Completed antibiotics 4/27  Anemia   - Monitor   - Conservative transfusion strategy  Moderate stage II COPD with an FEV1 of 1.11 liters, 63% of predicted.  There is no acute exacerbation   - O2/RT protocols, bronchodilators as needed  History of systemic arterial hypertension - currently hypotensive   - Continue holding medications  Chronic Atrial fibrillation with RVR -improved rate today   - Rate control, hold on anticoagulants for now, redosed with oral digoxin, check level every 48 hours  Cor pulmonale  Thrombocytopenia -worsening  Prophylaxis, diet, goals of care discussions will need to be initiated once gastroenterology has a good sense of her diagnosis and prognosis.  Palliative care has been consulted for assistance    Patient remains critically ill today requiring my active management of her persistent shock, fluid management, liver failure.    Discussed patient condition and risk of morbidity and/or mortality with Family, RN, RT, Pharmacy, [facility] resident, Charge nurse / hot rounds, Patient and GI.    The patient remains critically ill.  Critical care time = 34 minutes in directly providing and coordinating critical care and extensive data review.  No time overlap and excludes procedures.

## 2018-04-29 LAB
ALBUMIN SERPL BCP-MCNC: 2.5 G/DL (ref 3.2–4.9)
ALBUMIN/GLOB SERPL: 1.1 G/DL
ALP SERPL-CCNC: 46 U/L (ref 30–99)
ALT SERPL-CCNC: 106 U/L (ref 2–50)
ANION GAP SERPL CALC-SCNC: 9 MMOL/L (ref 0–11.9)
AST SERPL-CCNC: 85 U/L (ref 12–45)
BASOPHILS # BLD AUTO: 0.1 % (ref 0–1.8)
BASOPHILS # BLD: 0.01 K/UL (ref 0–0.12)
BILIRUB SERPL-MCNC: 19.8 MG/DL (ref 0.1–1.5)
BUN SERPL-MCNC: 51 MG/DL (ref 8–22)
CALCIUM SERPL-MCNC: 9.8 MG/DL (ref 8.5–10.5)
CHLORIDE SERPL-SCNC: 113 MMOL/L (ref 96–112)
CO2 SERPL-SCNC: 22 MMOL/L (ref 20–33)
CREAT SERPL-MCNC: 1.58 MG/DL (ref 0.5–1.4)
DIGOXIN SERPL-MCNC: 0.6 NG/ML (ref 0.8–2)
EOSINOPHIL # BLD AUTO: 0 K/UL (ref 0–0.51)
EOSINOPHIL NFR BLD: 0 % (ref 0–6.9)
ERYTHROCYTE [DISTWIDTH] IN BLOOD BY AUTOMATED COUNT: 66.7 FL (ref 35.9–50)
GLOBULIN SER CALC-MCNC: 2.2 G/DL (ref 1.9–3.5)
GLUCOSE SERPL-MCNC: 102 MG/DL (ref 65–99)
HCT VFR BLD AUTO: 27.4 % (ref 37–47)
HGB BLD-MCNC: 9.5 G/DL (ref 12–16)
IMM GRANULOCYTES # BLD AUTO: 0.09 K/UL (ref 0–0.11)
IMM GRANULOCYTES NFR BLD AUTO: 1.2 % (ref 0–0.9)
INR PPP: 3.91 (ref 0.87–1.13)
LYMPHOCYTES # BLD AUTO: 0.21 K/UL (ref 1–4.8)
LYMPHOCYTES NFR BLD: 2.8 % (ref 22–41)
MCH RBC QN AUTO: 34.5 PG (ref 27–33)
MCHC RBC AUTO-ENTMCNC: 34.7 G/DL (ref 33.6–35)
MCV RBC AUTO: 99.6 FL (ref 81.4–97.8)
MONOCYTES # BLD AUTO: 0.6 K/UL (ref 0–0.85)
MONOCYTES NFR BLD AUTO: 8 % (ref 0–13.4)
NEUTROPHILS # BLD AUTO: 6.55 K/UL (ref 2–7.15)
NEUTROPHILS NFR BLD: 87.9 % (ref 44–72)
NRBC # BLD AUTO: 0.04 K/UL
NRBC BLD-RTO: 0.5 /100 WBC
PLATELET # BLD AUTO: 53 K/UL (ref 164–446)
PMV BLD AUTO: 11.3 FL (ref 9–12.9)
POTASSIUM SERPL-SCNC: 3.7 MMOL/L (ref 3.6–5.5)
PROT SERPL-MCNC: 4.7 G/DL (ref 6–8.2)
PROTHROMBIN TIME: 38.1 SEC (ref 12–14.6)
RBC # BLD AUTO: 2.75 M/UL (ref 4.2–5.4)
SODIUM SERPL-SCNC: 144 MMOL/L (ref 135–145)
WBC # BLD AUTO: 7.5 K/UL (ref 4.8–10.8)

## 2018-04-29 PROCEDURE — 700102 HCHG RX REV CODE 250 W/ 637 OVERRIDE(OP): Performed by: STUDENT IN AN ORGANIZED HEALTH CARE EDUCATION/TRAINING PROGRAM

## 2018-04-29 PROCEDURE — 700111 HCHG RX REV CODE 636 W/ 250 OVERRIDE (IP): Performed by: HOSPITALIST

## 2018-04-29 PROCEDURE — 80162 ASSAY OF DIGOXIN TOTAL: CPT

## 2018-04-29 PROCEDURE — A9270 NON-COVERED ITEM OR SERVICE: HCPCS | Performed by: INTERNAL MEDICINE

## 2018-04-29 PROCEDURE — 700102 HCHG RX REV CODE 250 W/ 637 OVERRIDE(OP): Performed by: HOSPITALIST

## 2018-04-29 PROCEDURE — 700102 HCHG RX REV CODE 250 W/ 637 OVERRIDE(OP): Performed by: INTERNAL MEDICINE

## 2018-04-29 PROCEDURE — 85025 COMPLETE CBC W/AUTO DIFF WBC: CPT

## 2018-04-29 PROCEDURE — 85610 PROTHROMBIN TIME: CPT

## 2018-04-29 PROCEDURE — A9270 NON-COVERED ITEM OR SERVICE: HCPCS | Performed by: HOSPITALIST

## 2018-04-29 PROCEDURE — 770020 HCHG ROOM/CARE - TELE (206)

## 2018-04-29 PROCEDURE — 80053 COMPREHEN METABOLIC PANEL: CPT

## 2018-04-29 PROCEDURE — A9270 NON-COVERED ITEM OR SERVICE: HCPCS | Performed by: STUDENT IN AN ORGANIZED HEALTH CARE EDUCATION/TRAINING PROGRAM

## 2018-04-29 RX ORDER — HYDROMORPHONE HYDROCHLORIDE 2 MG/ML
4 INJECTION, SOLUTION INTRAMUSCULAR; INTRAVENOUS; SUBCUTANEOUS
Status: DISCONTINUED | OUTPATIENT
Start: 2018-04-29 | End: 2018-04-30

## 2018-04-29 RX ORDER — LORAZEPAM 2 MG/ML
1-2 INJECTION INTRAMUSCULAR
Status: DISCONTINUED | OUTPATIENT
Start: 2018-04-29 | End: 2018-04-30

## 2018-04-29 RX ORDER — LORAZEPAM 2 MG/ML
5 INJECTION INTRAMUSCULAR
Status: DISCONTINUED | OUTPATIENT
Start: 2018-04-29 | End: 2018-04-30

## 2018-04-29 RX ORDER — LORAZEPAM 2 MG/ML
1-5 INJECTION INTRAMUSCULAR
Status: DISCONTINUED | OUTPATIENT
Start: 2018-04-29 | End: 2018-04-29 | Stop reason: CLARIF

## 2018-04-29 RX ORDER — LORAZEPAM 2 MG/ML
3-4 INJECTION INTRAMUSCULAR
Status: DISCONTINUED | OUTPATIENT
Start: 2018-04-29 | End: 2018-04-30

## 2018-04-29 RX ORDER — POLYVINYL ALCOHOL 14 MG/ML
2 SOLUTION/ DROPS OPHTHALMIC EVERY 6 HOURS PRN
Status: DISCONTINUED | OUTPATIENT
Start: 2018-04-29 | End: 2018-05-01 | Stop reason: HOSPADM

## 2018-04-29 RX ORDER — LORAZEPAM 2 MG/ML
1 CONCENTRATE ORAL
Status: DISCONTINUED | OUTPATIENT
Start: 2018-04-29 | End: 2018-04-29 | Stop reason: CLARIF

## 2018-04-29 RX ORDER — LORAZEPAM 2 MG/ML
1 CONCENTRATE ORAL
Status: DISCONTINUED | OUTPATIENT
Start: 2018-04-29 | End: 2018-05-01 | Stop reason: HOSPADM

## 2018-04-29 RX ORDER — OMEPRAZOLE 20 MG/1
20 CAPSULE, DELAYED RELEASE ORAL DAILY
Status: DISCONTINUED | OUTPATIENT
Start: 2018-04-29 | End: 2018-04-30

## 2018-04-29 RX ORDER — ATROPINE SULFATE 10 MG/ML
2 SOLUTION/ DROPS OPHTHALMIC EVERY 4 HOURS PRN
Status: DISCONTINUED | OUTPATIENT
Start: 2018-04-29 | End: 2018-05-01 | Stop reason: HOSPADM

## 2018-04-29 RX ADMIN — OMEPRAZOLE 20 MG: 20 CAPSULE, DELAYED RELEASE ORAL at 11:22

## 2018-04-29 RX ADMIN — MIDODRINE HYDROCHLORIDE 10 MG: 5 TABLET ORAL at 07:57

## 2018-04-29 RX ADMIN — TIOTROPIUM BROMIDE 1 CAPSULE: 18 CAPSULE ORAL; RESPIRATORY (INHALATION) at 07:57

## 2018-04-29 RX ADMIN — BUDESONIDE AND FORMOTEROL FUMARATE DIHYDRATE 2 PUFF: 160; 4.5 AEROSOL RESPIRATORY (INHALATION) at 21:11

## 2018-04-29 RX ADMIN — THERA TABS 1 TABLET: TAB at 09:00

## 2018-04-29 RX ADMIN — DIGOXIN 125 MCG: 125 TABLET ORAL at 17:40

## 2018-04-29 RX ADMIN — PREDNISOLONE 40 MG: 15 SOLUTION ORAL at 07:57

## 2018-04-29 RX ADMIN — LACTULOSE 30 ML: 20 SOLUTION ORAL at 07:57

## 2018-04-29 RX ADMIN — LACTULOSE 30 ML: 20 SOLUTION ORAL at 17:39

## 2018-04-29 RX ADMIN — Medication 100 MG: at 07:57

## 2018-04-29 RX ADMIN — MIDODRINE HYDROCHLORIDE 10 MG: 5 TABLET ORAL at 11:22

## 2018-04-29 RX ADMIN — MIDODRINE HYDROCHLORIDE 10 MG: 5 TABLET ORAL at 17:40

## 2018-04-29 RX ADMIN — FOLIC ACID 1 MG: 1 TABLET ORAL at 07:57

## 2018-04-29 RX ADMIN — BUDESONIDE AND FORMOTEROL FUMARATE DIHYDRATE 2 PUFF: 160; 4.5 AEROSOL RESPIRATORY (INHALATION) at 07:58

## 2018-04-29 ASSESSMENT — LIFESTYLE VARIABLES
TOTAL SCORE: 4
HAVE PEOPLE ANNOYED YOU BY CRITICIZING YOUR DRINKING: YES
CONSUMPTION TOTAL: INCOMPLETE
TOTAL SCORE: 4
HAVE YOU EVER FELT YOU SHOULD CUT DOWN ON YOUR DRINKING: YES
EVER FELT BAD OR GUILTY ABOUT YOUR DRINKING: YES
EVER HAD A DRINK FIRST THING IN THE MORNING TO STEADY YOUR NERVES TO GET RID OF A HANGOVER: YES
TOTAL SCORE: 4
DO YOU DRINK ALCOHOL: YES

## 2018-04-29 ASSESSMENT — ENCOUNTER SYMPTOMS
SEIZURES: 0
NERVOUS/ANXIOUS: 0
PND: 0
DIAPHORESIS: 0
HALLUCINATIONS: 0
COUGH: 1
ORTHOPNEA: 0
WEAKNESS: 1
SPUTUM PRODUCTION: 0
ROS GI COMMENTS: ANOREXIA
CHILLS: 0
VOMITING: 0
DIARRHEA: 0
SHORTNESS OF BREATH: 0
HEARTBURN: 0
STRIDOR: 0
POLYDIPSIA: 0
PHOTOPHOBIA: 0
SPEECH CHANGE: 0
SORE THROAT: 0
HEADACHES: 0
MUSCULOSKELETAL NEGATIVE: 1
HEMOPTYSIS: 0
ABDOMINAL PAIN: 0
MEMORY LOSS: 0
EYE PAIN: 0
DEPRESSION: 0
PALPITATIONS: 0
WHEEZING: 0
BLOOD IN STOOL: 0
FOCAL WEAKNESS: 0
NAUSEA: 1
COUGH: 0
FALLS: 0
WEIGHT LOSS: 0
FEVER: 0
BRUISES/BLEEDS EASILY: 1
LOSS OF CONSCIOUSNESS: 0
TREMORS: 0

## 2018-04-29 ASSESSMENT — PATIENT HEALTH QUESTIONNAIRE - PHQ9
SUM OF ALL RESPONSES TO PHQ9 QUESTIONS 1 AND 2: 0
2. FEELING DOWN, DEPRESSED, IRRITABLE, OR HOPELESS: NOT AT ALL
1. LITTLE INTEREST OR PLEASURE IN DOING THINGS: NOT AT ALL
2. FEELING DOWN, DEPRESSED, IRRITABLE, OR HOPELESS: NOT AT ALL
SUM OF ALL RESPONSES TO PHQ9 QUESTIONS 1 AND 2: 0
1. LITTLE INTEREST OR PLEASURE IN DOING THINGS: NOT AT ALL

## 2018-04-29 ASSESSMENT — PAIN SCALES - GENERAL
PAINLEVEL_OUTOF10: 0

## 2018-04-29 ASSESSMENT — COPD QUESTIONNAIRES
HAVE YOU SMOKED AT LEAST 100 CIGARETTES IN YOUR ENTIRE LIFE: YES
DURING THE PAST 4 WEEKS HOW MUCH DID YOU FEEL SHORT OF BREATH: SOME OF THE TIME
DO YOU EVER COUGH UP ANY MUCUS OR PHLEGM?: NO/ONLY WITH OCCASIONAL COLDS OR INFECTIONS
COPD SCREENING SCORE: 6

## 2018-04-29 NOTE — DISCHARGE PLANNING
Medical Social Work    This  spoke with Palliative Care RN Elaina who states that pt's  would like a list of group homes.    This  provided list to pt's .

## 2018-04-29 NOTE — PROGRESS NOTES
[facility] ICU Progress Note      Admit Date: 4/22/2018  ICU Day: 4    Resident(s): Liborio Oliver   Attending: HAZEL AMOR/ Dr. Salazar Ha // Dr. GONDA, JEREMY M    Date & Time:   4/29/2018   9:16 AM       Patient ID:    Name:             Rox Valdez   YOB: 1940   Age:                 77 y.o.  female   MRN:               [id number]    HPI:  77 year old female with PMH ox of Atrial fibrillationon apixaban, moderate alcohol intake, COPD not on home oxygen admitted 4/22/2018 after two days history of fatigue, dizziness, and jaundice, two months of generalized pruritus. Found to be hypotensive, and having BLAYNE, elevated liver enzymes, and bilirubin [, , alk phos 73, total bili 19.2, albumin 2.1, total protein 5.3, ammonia 51, INR 6.03, PTT 53.7, APTT 53]. Creatinine 2.08, GFR 23,  LA 4.4-> 2.3, improved with IV fluid.GI consulted, recommended basic supportive care and to IV Vit K. Infection panels were unremarkable, SUMAN was positive at a titier of 1:80, F actin elevated 40, anti-giancarlo 9.0 (WNL), liver biopsy done 4/27/2018, not consistent with autoimmune hepatitis. GI thinks this is more with Severe alcoholic hepatitis (Maddrey Discriminant Function [MDF] score ?32). The patient has been started on prednisolone PO 40 mg daily on 4/28/2018 to complete 28 days, to be followed by a 2-week taper. Palliative care consulted for advanced care planning.     Consultants:  PMA: Jasper Milton   GI: Jose Raul Dexter     Procedures:  CT guided liver biopsy 4/26    Interval Events:    4/29/2018   Heart rate and blood pressure improving, AIFB, 80s-90's, off levo and vasso.    Cont digoxin 250 micros PO, monitor level given her renal function  Cont prednisolone for alcoholic hepatitis, omeprazole for stress ulcer Px.  Palliative care consulted for advanced care planning, appreciate recommendations.  Mostly transfer to the floor as she is off IV pressors.     4/28/2018 More alert  today, AFIB 90s-130's. Cont digoxin 250 micros PO, monitor level given her renal function, will give her albumin 25 gm X 2 to improve her intravascular volume.  GI thinks this is more with Severe alcoholic hepatitis (Maddrey Discriminant Function [MDF] score ?32). Starting 4/28/2018 prednisolone PO 40 mg daily for 28 days, to be followed by a 2-week taper. Consulting palliative care for advanced care planning.  4/27/2018 Tolerated the liver Bx yesterday, back on low dose pressors [Nor epi 3, Vasso 0.03 ml/min] after getting pain medications. AFIB 's, starting digoxin load, check level tomorrow.   4/27/2018 No acute events overnight A&O x 3 this am - off pressors this monring- continue midodrine and stress-dose steroids, CVP 9-16- A.fib on monitor 's (130's with movement) asymptomatic - adequate urine output, non-bloody bowel movement.      Review of Systems   Constitutional: Positive for malaise/fatigue. Negative for chills and fever.   HENT: Negative for sore throat.    Eyes: Negative for photophobia and pain.        Jaundice    Respiratory: Negative for cough, hemoptysis, shortness of breath and stridor.    Cardiovascular: Positive for leg swelling. Negative for chest pain and palpitations.   Gastrointestinal: Negative for abdominal pain, diarrhea and vomiting.        Anorexia   Genitourinary: Negative for urgency.   Musculoskeletal: Negative for falls.   Skin:        Diffuse bruising  upper and lower extremities. Jaundice    Neurological: Positive for weakness. Negative for seizures and loss of consciousness.   Endo/Heme/Allergies: Bruises/bleeds easily.   Psychiatric/Behavioral: Negative for hallucinations.     Physical Exam   Constitutional: She is oriented to person, place, and time. She appears well-developed. No distress.   markedly jaundiced    HENT:   Head: Normocephalic and atraumatic.   Eyes: Pupils are equal, round, and reactive to light. Right eye exhibits no discharge. Left eye exhibits no  discharge. Scleral icterus is present.   Neck: Normal range of motion. No JVD present.   Right IJ   Cardiovascular: Normal heart sounds.  Exam reveals no gallop and no friction rub.    No murmur heard.  Distant heart sounds, irregular rhythm     Pulmonary/Chest: Effort normal. No stridor. No respiratory distress. She has no wheezes.   Faint bibasal crackles, more on the right    Abdominal: Soft. Bowel sounds are normal. She exhibits no distension. There is no tenderness. There is no rebound and no guarding.   Musculoskeletal: Normal range of motion. She exhibits edema (B/L lower extremity ). She exhibits no tenderness or deformity.   Neurological: She is alert and oriented to person, place, and time. No cranial nerve deficit.   Skin: Skin is warm and dry. Capillary refill takes less than 2 seconds. Rash: Diffuse bruising and marked jaundice  She is not diaphoretic.   Jaundice    Psychiatric: She has a normal mood and affect. Her behavior is normal.     Respiratory:     Respiration: 18, Pulse Oximetry: 97 %    Chest Tube Drains:  NA        Invalid input(s): [id number]    HemoDynamics:  Pulse: 93, Heart Rate (Monitored): 99 Blood Pressure : 115/61, NIBP: 136/66 CVP (mm Hg): 6 MM HG      Date 04/29/18 0700 - 04/30/18 0659   Shift 9455-2854 3283-0890 9778-5849 24 Hour Total   I  N  T  A  K  E   P.O. 60   60      P.O. 60   60    Shift Total 60   60   O  U  T  P  U  T   Urine 80   80      Output (mL) (Urinary Catheter Coudé;Indwelling Catheter 12F) 80   80    Shift Total 80   80   NET -20   -20          Intake/Output Summary (Last 24 hours) at 04/29/18 0916  Last data filed at 04/29/18 0800   Gross per 24 hour   Intake            165.6 ml   Output             1165 ml   Net           -999.4 ml       Weight: 71.4 kg (157 lb 6.5 oz)  Body mass index is 28.78 kg/m².    Recent Labs      04/27/18   0415  04/28/18   0510  04/29/18   0515   SODIUM  138  139  144   POTASSIUM  4.0  4.0  3.7   CHLORIDE  111  111  113*   CO2  19*   21  22   BUN  45*  51*  51*   CREATININE  1.45*  1.69*  1.58*   CALCIUM  9.1  9.0  9.8       GI/Nutrition:  Renal/hepatic diet   Recent Labs      18   0510  18   0515   ALTSGPT  192*  149*  106*   ASTSGOT  174*  117*  85*   ALKPHOSPHAT  68  65  46   TBILIRUBIN  21.5*  19.6*  19.8*   GLUCOSE  129*  136*  102*     Heme:  Platelets decreased, TEG shows decreased reaction time   INR elevated 3.10, improved from admission, H/H stable   Recent Labs      18   0510  18   0515   RBC  3.27*  3.12*  2.75*   HEMOGLOBIN  11.3*  10.8*  9.5*   HEMATOCRIT  32.7*  31.3*  27.4*   PLATELETCT  100*  71*  53*   PROTHROMBTM   --    --   38.1*   INR   --    --   3.91*     Infectious Disease:    Ceftriaxone and doxy   Temp  Av.2 °C (97.1 °F)  Min: 35.8 °C (96.5 °F)  Max: 36.7 °C (98.1 °F)  Recent Labs      18   0510  18   0515   WBC  9.2  9.0  7.5   NEUTSPOLYS  88.20*  89.40*  87.90*   LYMPHOCYTES  2.80*  2.30*  2.80*   MONOCYTES  8.20  7.00  8.00   EOSINOPHILS  0.00  0.00  0.00   BASOPHILS  0.10  0.10  0.10   ASTSGOT  174*  117*  85*   ALTSGPT  192*  149*  106*   ALKPHOSPHAT  68  65  46   TBILIRUBIN  21.5*  19.6*  19.8*       Meds:  • omeprazole  20 mg     • thiamine  100 mg      And   • folic acid  1 mg      And   • multivitamin  1 Tab     • digoxin  125 mcg     • prednisoLONE  40 mg     • lactulose  30 mL     • midodrine  10 mg     • vasopressin (PITRESSIN) infusion  0.03 Units/min Stopped (18)   • traZODone  100 mg     • NORepinephrine (LEVOPHED) infusion  0.5-30 mcg/min Stopped (18)   • Respiratory Care per Protocol       • budesonide-formoterol  2 Puff     • tiotropium  1 Cap        Problem and Plan:  Hepatic failure (HCC)  Drinks 3 glasses of wine a day for many years.  Jaundice, fatigue, poor PO intake.    No fever, chills, abdominal pain, or signs/symptoms of GI bleeding.   Marked juandice and bruising. Mental status  within normal limits.   No asterixis. No hepatic encephalopathy.      Initial labs , , alk phos 73, total bili 19.2, albumin 2.1, total protein 5.3, ammonia 51  INR 6.03, PTT 53.7, APTT 53. Ferritin very high, transferrin low. Acute hepatitis panel negative. HIV negative. TSH WNL.   Hep B Core total antibody, Hep C viral RNA QUALITATIVE,  Hep B surface antibody Neg.   TEG study unremarkable.  anti-giancarlo 9.0 (WNL)      SUMAN titier [1:80] and F-actin antibody [40], and smooth muscle AB are Positive consistent with autoimmune hepatitis [GI think this is false positive]. However liver biopsies do not show significantly increased numbers of plasma cells which can be associated with autoimmune hepatitis. [Path comment: Liver Bx: rule out alcoholic hepatitis withassociated cirrhosis. ]     Abdominal ultrasound suggests cirrhosis.    MELD score 40, MELD sodium 40, mortality 71.3 %  Child Valenzuela C  GI thinks this is more with Severe alcoholic hepatitis rather than autoimmune hepatitis      - AST, ALT improving, T. anni still high  - continue to hold home apixaban due to child valenzuela C and BLAYNE   - Renal/hepatic diet.   - GI thinks this is more with Severe alcoholic hepatitis (Maddrey Discriminant Function [MDF] score ?32).   - Starting 4/28/2018 prednisolone PO 40 mg daily for 28 days, to be followed by a 2-week taper.  - Omeprazole for stress ulcer prophylaxis. Has coagulopathy, low platelet count, INR) >1.5, and she is on steroids.        Acute kidney injury (HCC)  Poor PO intake, high BUN/Cr ratio, likely from dehydration.   Cr 2.08 (baseline Cr 0.85).    BUN/Cr consistent with pre-renal, Improving with re-hydration      Jese 2.3 %, however patient was on diuretics, UA hyaline casts, no active sediment    Renal ultrasound unremarkable.       Improving with re-hydration & pressors.   - Cont IV hydration & wean pressor as able.   - holding home spironolactone, losartan.    - renally dose meds.      Lactic acidosis  LA  4.4, improved to 1.6 with IV fluid.  -Resolved.        Hypotension  S/p IV fluid and pressor ( levo & vasopressin ).   Relative adrenal insufficiency, dehydration, liver failure.   -Hold home BP meds [atenolol 50 mg, losartan 25 mg, spironolactone 25 mg]  -Cont Midodrine, currently on prednisone for alcoholic hepatitis, was on Hydrocortisone,   -Cont digoxin 250 micros PO, monitor level given her renal function.        Abnormal chest x-ray  - ? CAP,  unusual wedge-shaped opacification noted in right lower hemithorax adjacent to the right heart border. This could represent collapse of a portion of the right lung vs PNA. Underlying carcinoma is a possibility. Fibrosis pneumonia or loculated fluid are also possibilities. Minimal blunting of costophrenic angles bilaterally.    - received ceftriaxone x1 and doxycycline in ED   - Does not look infectious clinically however given low BP & elevated procalcitonin, there is concern for CAP and hence ceftriaxone & doxycycline has been restarted, will plan for 5 days total, thru 4/27/2018    - supportive IS, O2, RT    COPD (chronic obstructive pulmonary disease) (HCC)  Not on home oxygen  PFT Aug 2017 Ratio 61% FEV1 63%, DLCO WNL   - O2, RT, continue home spiriva, symbicort       Compression fracture of L1 lumbar vertebra (HCC)  Moderate anterior wedge compression fracture on X ray  Hx of fall many days ago, patient does not remember when  - Fall precaution     - can use lidocaine patch for pain if needed [avoid NSAIDs]     Atrial fibrillation (HCC)  Chronic afib, on atenolol 50 mg, currently holding given her hypotension & apixaban at home.     - holding apixaban due to liver failure, elevated INR and BLAYNE  - Cont digoxin 250 micros PO, monitor level given her renal function.     Coagulopathy (HCC)  No signs of active bleeding   Mostly related to liver disease   -Omeprazole for stress ulcer prophylaxis. Has coagulopathy, low platelet count, INR) >1.5, and she is on steroids.     -CTM for signs of bleeding     Anemia  Hb stable around 9.5-10.5  No signs of active bleeding     -Omeprazole for stress ulcer prophylaxis. Has coagulopathy, low platelet count, INR) >1.5, and she is on steroids.    -Cont to monitor for signs of bleeding    Quality Measures:  Lines:   Right IJ, PIV  Sanchez Catheter: Yes, critically ill for I&O   DVT Prophylaxis:  SCDs, high risk of bleeding   Ulcer Prophylaxis: Omeprazole.  Antibiotics:   s/p Ceftriaxone/ Doxy, thru 4/27/2018     CODE STATUS:  FULL  DISPO:   ICU

## 2018-04-29 NOTE — ASSESSMENT & PLAN NOTE
Hb stable around 9.5-10.5  No signs of active bleeding     -Omeprazole was given for stress ulcer prophylaxis. Has coagulopathy, low platelet count, INR) >1.5.  - comfort care since 4/29/18 per patient and family

## 2018-04-29 NOTE — PROGRESS NOTES
[facility] ICU Transfer Note    Admit Date:    4/22/2018  Transfer Date:    4/29/2018     Primary Diagnosis:  Severe Alcoholic Hepatitis      ICU Course Summary (Brief Narrative):    77 year old female with PMH ox of Atrial fibrillationon apixaban, moderate alcohol intake, COPD not on home oxygen admitted 4/22/2018 after two days history of fatigue, dizziness, and jaundice, two months of generalized pruritus. Found to be hypotensive, and having acute liver failure, and BLAYNE, elevated liver enzymes, and bilirubin [, , alk phos 73, total bili 19.2, albumin 2.1, total protein 5.3, ammonia 51, INR 6.03, PTT 53.7, APTT 53]. Creatinine 2.08, GFR 23,  LA 4.4-> 2.3, improved with IV fluid.GI consulted, recommended basic supportive care and to IV Vit K. The patient was also treated with ceftriaxone & doxycycline for 5 days for sepsis secondary to pneumonia. Infection panels were unremarkable, SUMAN was positive at a titier of 1:80, F actin elevated 40, anti smooth muscle Ab was positive with a high titre [1:640], anti-giancarlo 9.0 (WNL), liver biopsy done 4/27/2018, not consistent with autoimmune hepatitis. GI thinks this is more with Severe alcoholic hepatitis (Maddrey Discriminant Function [MDF] score ?32). The patient has been started on prednisolone PO 40 mg daily on 4/28/2018 to complete 28 days, to be followed by a 2-week taper. Palliative care consulted for advanced care planning     Labs and imaging studies to be continued daily after transfer and Indication:  Daily CBC, low PLT, Hb, WBC  Digoxin level, renal impairment can get toxicity   PT, INR  Daily CMP, liver function, renal function     Things to follow:   Palliative care consultation, goals of care.  Digoxin level, renal impairment can get toxicity.   Alcoholic Hepatitis response to prednisolone.   GI recommendations.

## 2018-04-29 NOTE — CARE PLAN
Problem: Venous Thromboembolism (VTW)/Deep Vein Thrombosis (DVT) Prevention:  Goal: Patient will participate in Venous Thrombosis (VTE)/Deep Vein Thrombosis (DVT)Prevention Measures  Outcome: PROGRESSING AS EXPECTED      Problem: Bowel/Gastric:  Goal: Normal bowel function is maintained or improved  Outcome: PROGRESSING AS EXPECTED      Problem: Pain Management  Goal: Pain level will decrease to patient's comfort goal  Outcome: PROGRESSING AS EXPECTED

## 2018-04-29 NOTE — CARE PLAN
Problem: Infection  Goal: Will remain free from infection  Outcome: PROGRESSING AS EXPECTED    Intervention: Implement standard precautions and perform hand washing before and after patient contact  Hand hygiene performed before and after assessing patient. Daily CHG bath completed. Scrub the hub prior to accessing central line. Linens changed daily and PRN when soiled.       Problem: Skin Integrity  Goal: Risk for impaired skin integrity will decrease  Outcome: PROGRESSING SLOWER THAN EXPECTED  Patient turned Q2H. Pillows used for support and repositioning.Draw sheet used to decrease friction. Mepilex in place on sacrum. Edematous extremities elevated on pillows. Heel floated. Appropriate wound protocols in place.

## 2018-04-29 NOTE — PROGRESS NOTES
Pulmonary Critical Care Progress Note        Admit:  4/22/2018    Chief Complaint:  Weakness/fatigue - low Bp/hepatic and renal failure    History of Present Illness:    This is a 77-year-old lady   with an extensive past medical history.  She comes in to the emergency room   complaining of weakness and fatigue.  She has been having problems with   generalized pruritus for quite some time, but it has become worse for the last   several days.  She has been getting dizzy when she stands for the last 3 days   and about 3 days ago her  noted that she was becoming jaundiced.  She   came in to the emergency department.  In the field, her blood pressure was in   60s systolic.  She is receiving intravenous crystalloid solution.     She admits to drinking 3 glasses of white wine a day, which she has done for   many years.  She occasionally will take acetaminophen for pain, but does not   abuse it.  She has no known history of liver disease.    Review of Systems   Constitutional: Positive for malaise/fatigue. Negative for diaphoresis, fever and weight loss.   HENT: Negative.    Respiratory: Positive for cough. Negative for hemoptysis, sputum production, shortness of breath and wheezing.    Cardiovascular: Positive for leg swelling. Negative for chest pain, palpitations, orthopnea and PND.   Gastrointestinal: Positive for nausea. Negative for abdominal pain, blood in stool, heartburn and vomiting.   Genitourinary: Negative.    Musculoskeletal: Negative.    Skin: Negative.         Persistent jaundice   Neurological: Positive for weakness. Negative for tremors, speech change, focal weakness, loss of consciousness and headaches.   Endo/Heme/Allergies: Negative for polydipsia. Bruises/bleeds easily.   Psychiatric/Behavioral: Negative for depression and memory loss. The patient is not nervous/anxious.        Interval Events:  24 hour interval history reviewed    - Off pressors since yesterday morning, Adequate blood pressure  on Midodrine, CVP 10-12   - Biopsy resulted in cirrhosis likely all secondary to alcoholism   - Slowly dropping cell counts again without signs of bleeding   - Improving acute kidney injury, low potassium    - bilirubin continues to climb slightly but improvement in AST/ALT, INR increasing   -Heart rate better controlled with additional digoxin    PFSH:  No change.    Physical Exam   Constitutional: She is oriented to person, place, and time. No distress.   Frail, elderly, very weak appearing   HENT:   Head: Normocephalic and atraumatic.   Mouth/Throat: No oropharyngeal exudate.   Dry oral mucosa membranes   Eyes: EOM are normal. Pupils are equal, round, and reactive to light. Scleral icterus is present.   Neck: Neck supple. No JVD present. No tracheal deviation present.   Cardiovascular: Intact distal pulses.  An irregularly irregular rhythm present. Frequent extrasystoles are present. PMI is displaced.  Exam reveals distant heart sounds.    No murmur heard.  Pulmonary/Chest: Effort normal. No stridor. No respiratory distress. She has no wheezes. She has rales.   Abdominal: Soft. She exhibits distension. There is no tenderness. There is no guarding.   Fluid shift   Musculoskeletal: She exhibits edema. She exhibits no tenderness.   Neurological: She is oriented to person, place, and time. No cranial nerve deficit or sensory deficit.   Drowsy with generalized weakness   Skin: Skin is dry. Capillary refill takes 2 to 3 seconds. She is not diaphoretic. There is pallor.   Jaundiced, cool   Psychiatric: She has a normal mood and affect. Her behavior is normal. Judgment and thought content normal.   Nursing note and vitals reviewed.      Respiratory:    2 lpm n/c, BID treatments per home regimen, IS 1100mL  Pulse Oximetry: 96 %          ImagingCXR  I have personally reviewed the chest x-ray my impression is  (compared to prior film) none again today           Invalid input(s): [id number]    HemoDynamics:  Pulse: 92,  Heart Rate (Monitored): 99  Blood Pressure : 115/61, NIBP: 127/70  CVP (mm Hg): 5 MM HG CVP 5-12    Imaging: Available data reviewed    Echocardiogram 4/24: CONCLUSIONS  No prior study is available for comparison.   Left ventricular ejection fraction is visually estimated to be 70%.  Severely dilated left atrium.  Enlarged right atrium.  Estimated right ventricular systolic pressure  is 35 mmHg.        Neuro:  GCS Total Arrington Coma Score: 15       Imaging: Available data reviewed    Fluids:  Intake/Output       04/27/18 0700 - 04/28/18 0659 04/28/18 0700 - 04/29/18 0659 04/29/18 0700 - 04/30/18 0659      8807-4005 8333-0234 Total 8902-0443 2133-4982 Total 0414-2563 6599-4372 Total       Intake    P.O.  460  100 560  100  -- 100  --  -- --    P.O. 460 100 560 100 -- 100 -- -- --    I.V.  168.1  118.9 286.9  105.2  22.2 127.4  --  -- --    Vasopressin Volume 108 108 216 90 22.2 112.2 -- -- --    Norepinephrine Volume 60.1 10.9 70.9 15.2 -- 15.2 -- -- --    Total Intake 628.1 218.9 846.9 205.2 22.2 227.4 -- -- --       Output    Urine  400  1350 1750  510  610 1120  --  -- --    Number of Times Voided -- 0 x 0 x -- -- -- -- -- --    Number of Times Incontinent of Urine -- 0 x 0 x -- -- -- -- -- --    Urine Void (mL) (non-catheter) 400 0 400 -- -- -- -- -- --    Output (mL) (Urinary Catheter Coudé;Indwelling Catheter 12F) -- 1350 1350  -- -- --    Stool  --  0 0  --  0 0  --  -- --    Number of Times Stooled 1 x 2 x 3 x 1 x 0 x 1 x -- -- --    Measurable Stool (mL) -- 0 0 -- 0 0 -- -- --    Total Output 400 1350 1750  -- -- --       Net I/O     228.1 -1131.1 -903.1 -304.8 -587.8 -892.6 -- -- --        Weight: 71.4 kg (157 lb 6.5 oz)  Recent Labs      04/27/18   0415  04/28/18   0510  04/29/18   0515   SODIUM  138  139  144   POTASSIUM  4.0  4.0  3.7   CHLORIDE  111  111  113*   CO2  19*  21  22   BUN  45*  51*  51*   CREATININE  1.45*  1.69*  1.58*   CALCIUM  9.1  9.0  9.8        GI/Nutrition:    Imaging: Available data reviewed    Abdominal ultrasound :  1.  Lobular liver surface and heterogeneous liver echogenicity are consistent with cirrhosis.    2.  Post cholecystectomy.    3.  No biliary ductal dilatation appreciated.  taking PO - minimal appetite    Liver Function  Recent Labs      18   0510  18   ALTSGPT  192*  149*  106*   ASTSGOT  174*  117*  85*   ALKPHOSPHAT  68  65  46   TBILIRUBIN  21.5*  19.6*  19.8*   GLUCOSE  129*  136*  102*       Heme:  Recent Labs      18   0510  18   RBC  3.27*  3.12*  2.75*   HEMOGLOBIN  11.3*  10.8*  9.5*   HEMATOCRIT  32.7*  31.3*  27.4*   PLATELETCT  100*  71*  53*   PROTHROMBTM   --    --   38.1*   INR   --    --   3.91*       Infectious Disease:  Temp  Av.2 °C (97.1 °F)  Min: 35.8 °C (96.5 °F)  Max: 36.7 °C (98.1 °F)  Micro: reviewed negative  Serology: Positive smooth muscle antibody, F-actin , SUMAN titer   Cytology: Cirrhotic liver with fibrosis, no hemosiderin, no malignancy  Recent Labs      18   0510  04/29/18   0515   WBC  9.2  9.0  7.5   NEUTSPOLYS  88.20*  89.40*  87.90*   LYMPHOCYTES  2.80*  2.30*  2.80*   MONOCYTES  8.20  7.00  8.00   EOSINOPHILS  0.00  0.00  0.00   BASOPHILS  0.10  0.10  0.10   ASTSGOT  174*  117*  85*   ALTSGPT  192*  149*  106*   ALKPHOSPHAT  68  65  46   TBILIRUBIN  21.5*  19.6*  19.8*     Current Facility-Administered Medications   Medication Dose Frequency Provider Last Rate Last Dose   • thiamine tablet 100 mg  100 mg DAILY Liborio Oliver M.D.   100 mg at 18    And   • folic acid (FOLVITE) tablet 1 mg  1 mg DAILY Liborio Oliver M.D.   1 mg at 18    And   • multivitamin (THERAGRAN) tablet 1 Tab  1 Tab DAILY Liborio Oliver M.D.   1 Tab at 18 0802   • digoxin (LANOXIN) tablet 125 mcg  125 mcg DAILY AT 1800 Liborio Oliver M.D.   125 mcg at 18 1009   • prednisoLONE  (PRELONE) 15 MG/5ML syrup 40 mg  40 mg DAILY Liborio Oliver M.D.   40 mg at 04/28/18 1546   • lactulose 20 GM/30ML solution 30 mL  30 mL TID Marilyn Gupta M.D.   30 mL at 04/28/18 2028   • midodrine (PROAMATINE) tablet 10 mg  10 mg TID WITH MEALS Jeremy M Gonda, M.D.   10 mg at 04/28/18 1657   • vasopressin (VASOSTRICT) 20 Units in  mL Infusion  0.03 Units/min Continuous Salazar Ha M.D.   Stopped at 04/28/18 2028   • traZODone (DESYREL) tablet 100 mg  100 mg QHS Jasper Torres M.D.   Stopped at 04/27/18 2100   • norepinephrine (LEVOPHED) 8 mg in  mL Infusion  0.5-30 mcg/min Continuous Jeremy M Gonda, M.D.   Stopped at 04/28/18 1600   • Respiratory Care per Protocol   Continuous RT Marie Panchal M.D.       • budesonide-formoterol (SYMBICORT) 160-4.5 MCG/ACT inhaler 2 Puff  2 Puff BID Marie Panchal M.D.   2 Puff at 04/28/18 2028   • tiotropium (SPIRIVA) 18 MCG inhalation capsule 1 Cap  1 Cap DAILY Marie Panchal M.D.   1 Cap at 04/28/18 1321     Last reviewed on 4/22/2018  9:51 PM by Rachna Smith R.N.    Quality  Measures:  Labs reviewed, Radiology images reviewed and Medications reviewed  Sanchez catheter: Urinary Tract Retention or Urinary Tract Obstruction  Central line in place: Need for access    DVT Prophylaxis: Contraindicated - High bleeding risk  DVT prophylaxis - mechanical: SCDs  Ulcer prophylaxis: Yes    Assessed for rehab: Patient was assess for and/or received rehabilitation services during this hospitalization      Problems/plan:  Undifferentiated shock, likely combined vasodistributive from sepsis plus liver disease, ?QUAN -resolved   - MAP goal> 55   - Continue midodrine at current dose   -Frequently becomes intravascularly depleted but okay for today , prn albumin   - s/p Antibiotics/sepsis protocol   - Continue stress dose steroids without weaning yet  Acute on chronic liver failure - ETOH s/p biopsy (serology false (+))   - GI following,  elevated discriminatory function --> prednisolone   - Biopsy with cirrhosis   - Poor prognosis, palliative care consultation ongoing  Acute kidney injury - unchanged   - Avoid nephrotoxins, monitor urine output, maintain perfusion  Hyponatremia - improved  Intravascular volume depletion - resolved S/P resuscitation  Abnormal chest x-ray concerning for pneumonia -cultures negative   -Completed antibiotics 4/27  Anemia   - Monitor   - Conservative transfusion strategy  Moderate stage II COPD with an FEV1 of 1.11 liters, 63% of predicted.  There is no acute exacerbation   - O2/RT protocols, bronchodilators as needed  History of systemic arterial hypertension - currently hypotensive   - Continue holding medications  Chronic Atrial fibrillation with RVR -improved rate again today   - Rate control, hold on anticoagulants for now,  continue with digoxin, monitoring levels  Cor pulmonale  Pancytopenia    - conservative transfusion strategy  Prophylaxis, diet, goals of care discussions ongoing    Ok to transfer patient out of ICU today. [facility] will sign off at transfer. Please call with questions.    Discussed patient condition and risk of morbidity and/or mortality with Family, RN, RT, Pharmacy, [facility] resident, Charge nurse / hot rounds, Patient and GI,  palliative care

## 2018-04-29 NOTE — PALLIATIVE CARE
"Reason for PC Consult: Advance Care Planning    Consulted by: Liborio Oliver MD    Assessment:  General: 78yo lady BIB EMS from home and admitted through ED 4/22 to ICU with acute liver and renal failure, seen by GI - severe alcoholic hepatitis, not a transplant candidate, MELD-Na 35 (90 day mortality 66%).  PMH: Afib, HTN, COPD, former smoker, ETOH - 3 glasses wine/day    Dyspnea: No-    Last BM: 04/28/18-    Pain: No-    Depression: Unable to determine-      Spiritual:  Is Oriental orthodox or spirituality important for coping with this illness? No-    Has a  or spiritual provider visit been requested? No    Palliative Performance Scale: 30    Advanced Directive: None-    DPOA:  -    POLST:No-      Code Status: Full, transitioned to Comfort Measures at this encounter      Outcome:  Pt with eyes closed, , markedly jaundice.   Ramsey stated she is sleeping and he wanted his son to be present for conversation.  Introduced self and role of Palliative Care to pt's  and son, Ramsey sanabria, and moved to family room.    Dr. Alejandro provided overview of hospital course, lack of interventions available (including that steroids have not shown to help in ETOH hepatitis) and grim prognosis.  Spouse specifically asked about a TF for nutrition - this was discussed in detail when looking at GOC.  Encounter continued after Dr. Alejandro's departure.    Explored family's/pt's expressed values, beliefs, and preferences in order to identify GOC.  Pt and spouse have been  for 56 years \"and we always lived life to the fullest and taught our kids and grandkids that I hope.\"  Spouse initially stated that per their previous conversations, pt would want everything done -- \"aggressive care, whatever is available.\"  Discussed that definitive care is not available and that pt has terminal condition, suggested to focus on GOC for the pt's time remaining.  Spouse stated that pt never wanted to be in a nursing home and that she would " "want to be at home.      Detailed hospice support at home with possibly extra caregivers, GH and SNF, and comfort care while inpt.  Many questions answered.  Spouse admittedly nervous about having pt at home and being unable to care for her \"the way the nurses do,\" asking \"What if something goes wrong?  Or I can't physically turn her to clean a bowel movement?\"  Encouraged family to speak with a hospice provider and/or their neighbor who apparently has something to do with hospice, to help in decision-making of location of pt for hospice.      Spouse and son agreed to comfort care at this time, including no steroids/no TF, as pt is set to transfer out of ICU.  Offered family list of GHs which SW provided (thank you!).    Active listening, reflection, reminiscing, and empathic support utilized throughout this encounter.  All questions answered.  PC contact information given.  (Note:  Spouse does not like to be \"pat pat pat on my arm\" or asked how he is 'holding up' - validated and normalized these feelings.)    Discussed with/Updated: Dr. Gonda, ICU RN RODRIGUEZ Ruth     Plan: comfort care, d/c hospice to home or GH, family needs to do some research into facilities and speak with neighbor who works in hospice    Recommendations:  I recommend a hospice consult.    Thank you for allowing Palliative Care to participate in this patient's care. Please feel free to call [phone] with any questions or concerns.  "

## 2018-04-29 NOTE — CARE PLAN
Problem: Safety  Goal: Will remain free from injury  Outcome: PROGRESSING AS EXPECTED  Pt remained free from injury this shift  Intervention: Provide assistance with mobility   04/28/18 2343   OTHER   Assistance / Tolerance Assistance of Two or More;Tolerates Well     Intervention: Collaborate with Interdisciplinary Team for safe transfer and mobilization techniques   04/28/18 2343   OTHER   Assistive Devices Hand held assist       Goal: Will remain free from falls  Outcome: PROGRESSING AS EXPECTED  Pt remained free from falls and injury this shift  Intervention: Assess risk factors for falls   04/28/18 2343   OTHER   Fall Risk High Risk to Fall - 2 or more points    Mobility Status Assessment 2-2 Healthcare Providers Required for Assistance with Ambulation & Transfer   History of fall 0   Pt Calls for Assistance Yes     Intervention: Implement fall precautions   04/28/18 2343   OTHER   Environmental Precautions Treaded Slipper Socks on Patient;Personal Belongings, Wastebasket, Call Bell etc. in Easy Reach;Report Given to Other Health Care Providers Regarding Fall Risk;Bed in Low Position;Mobility Assessed & Appropriate Sign Placed   IV Pole on Same Side of Bed as Bathroom Yes   Bed Alarm Yes - Alarm On         Problem: Infection  Goal: Will remain free from infection  Outcome: PROGRESSING AS EXPECTED  Pt remains from from infection at this time  Intervention: Assess signs and symptoms of infection  No signs and symptoms noted of infection at this time  Intervention: Implement standard precautions and perform hand washing before and after patient contact  Handwashing performed

## 2018-04-30 LAB
ALBUMIN SERPL BCP-MCNC: 2.7 G/DL (ref 3.2–4.9)
ALBUMIN/GLOB SERPL: 1.1 G/DL
ALP SERPL-CCNC: 65 U/L (ref 30–99)
ALT SERPL-CCNC: 116 U/L (ref 2–50)
ANION GAP SERPL CALC-SCNC: 6 MMOL/L (ref 0–11.9)
AST SERPL-CCNC: 101 U/L (ref 12–45)
BASOPHILS # BLD AUTO: 0.3 % (ref 0–1.8)
BASOPHILS # BLD: 0.03 K/UL (ref 0–0.12)
BILIRUB SERPL-MCNC: 24.5 MG/DL (ref 0.1–1.5)
BUN SERPL-MCNC: 53 MG/DL (ref 8–22)
CALCIUM SERPL-MCNC: 10.6 MG/DL (ref 8.5–10.5)
CHLORIDE SERPL-SCNC: 113 MMOL/L (ref 96–112)
CO2 SERPL-SCNC: 23 MMOL/L (ref 20–33)
CREAT SERPL-MCNC: 1.59 MG/DL (ref 0.5–1.4)
DIGOXIN SERPL-MCNC: 0.4 NG/ML (ref 0.8–2)
EOSINOPHIL # BLD AUTO: 0 K/UL (ref 0–0.51)
EOSINOPHIL NFR BLD: 0 % (ref 0–6.9)
ERYTHROCYTE [DISTWIDTH] IN BLOOD BY AUTOMATED COUNT: 67.7 FL (ref 35.9–50)
GLOBULIN SER CALC-MCNC: 2.4 G/DL (ref 1.9–3.5)
GLUCOSE SERPL-MCNC: 127 MG/DL (ref 65–99)
HCT VFR BLD AUTO: 34.9 % (ref 37–47)
HGB BLD-MCNC: 11.6 G/DL (ref 12–16)
IMM GRANULOCYTES # BLD AUTO: 0.19 K/UL (ref 0–0.11)
IMM GRANULOCYTES NFR BLD AUTO: 1.8 % (ref 0–0.9)
LYMPHOCYTES # BLD AUTO: 0.28 K/UL (ref 1–4.8)
LYMPHOCYTES NFR BLD: 2.6 % (ref 22–41)
MCH RBC QN AUTO: 33.5 PG (ref 27–33)
MCHC RBC AUTO-ENTMCNC: 33.2 G/DL (ref 33.6–35)
MCV RBC AUTO: 100.9 FL (ref 81.4–97.8)
MONOCYTES # BLD AUTO: 1.25 K/UL (ref 0–0.85)
MONOCYTES NFR BLD AUTO: 11.5 % (ref 0–13.4)
NEUTROPHILS # BLD AUTO: 9.08 K/UL (ref 2–7.15)
NEUTROPHILS NFR BLD: 83.8 % (ref 44–72)
NRBC # BLD AUTO: 0.2 K/UL
NRBC BLD-RTO: 1.8 /100 WBC
PLATELET # BLD AUTO: 71 K/UL (ref 164–446)
PMV BLD AUTO: 11.9 FL (ref 9–12.9)
POTASSIUM SERPL-SCNC: 3.3 MMOL/L (ref 3.6–5.5)
PROT SERPL-MCNC: 5.1 G/DL (ref 6–8.2)
RBC # BLD AUTO: 3.46 M/UL (ref 4.2–5.4)
SODIUM SERPL-SCNC: 142 MMOL/L (ref 135–145)
WBC # BLD AUTO: 10.8 K/UL (ref 4.8–10.8)

## 2018-04-30 PROCEDURE — 700111 HCHG RX REV CODE 636 W/ 250 OVERRIDE (IP): Performed by: HOSPITALIST

## 2018-04-30 PROCEDURE — A9270 NON-COVERED ITEM OR SERVICE: HCPCS | Performed by: INTERNAL MEDICINE

## 2018-04-30 PROCEDURE — A9270 NON-COVERED ITEM OR SERVICE: HCPCS | Performed by: STUDENT IN AN ORGANIZED HEALTH CARE EDUCATION/TRAINING PROGRAM

## 2018-04-30 PROCEDURE — 770001 HCHG ROOM/CARE - MED/SURG/GYN PRIV*

## 2018-04-30 PROCEDURE — 700102 HCHG RX REV CODE 250 W/ 637 OVERRIDE(OP): Performed by: STUDENT IN AN ORGANIZED HEALTH CARE EDUCATION/TRAINING PROGRAM

## 2018-04-30 PROCEDURE — 302146: Performed by: HOSPITALIST

## 2018-04-30 PROCEDURE — 302255 BARRIER CREAM MOISTURE BAZA PROTECT (ZINC) 5OZ: Performed by: HOSPITALIST

## 2018-04-30 PROCEDURE — 700102 HCHG RX REV CODE 250 W/ 637 OVERRIDE(OP): Performed by: INTERNAL MEDICINE

## 2018-04-30 PROCEDURE — 80053 COMPREHEN METABOLIC PANEL: CPT

## 2018-04-30 PROCEDURE — 85025 COMPLETE CBC W/AUTO DIFF WBC: CPT

## 2018-04-30 PROCEDURE — 80162 ASSAY OF DIGOXIN TOTAL: CPT

## 2018-04-30 PROCEDURE — 700111 HCHG RX REV CODE 636 W/ 250 OVERRIDE (IP): Performed by: INTERNAL MEDICINE

## 2018-04-30 PROCEDURE — 700102 HCHG RX REV CODE 250 W/ 637 OVERRIDE(OP): Performed by: HOSPITALIST

## 2018-04-30 PROCEDURE — A9270 NON-COVERED ITEM OR SERVICE: HCPCS | Performed by: HOSPITALIST

## 2018-04-30 RX ORDER — MORPHINE SULFATE 100 MG/5ML
10 SOLUTION ORAL
Status: DISCONTINUED | OUTPATIENT
Start: 2018-04-30 | End: 2018-05-01 | Stop reason: HOSPADM

## 2018-04-30 RX ORDER — ONDANSETRON 2 MG/ML
4 INJECTION INTRAMUSCULAR; INTRAVENOUS EVERY 4 HOURS PRN
Status: DISCONTINUED | OUTPATIENT
Start: 2018-04-30 | End: 2018-04-30

## 2018-04-30 RX ORDER — FOLIC ACID 1 MG/1
1 TABLET ORAL DAILY
Status: DISCONTINUED | OUTPATIENT
Start: 2018-04-30 | End: 2018-04-30

## 2018-04-30 RX ORDER — ONDANSETRON 4 MG/1
4 TABLET, ORALLY DISINTEGRATING ORAL EVERY 4 HOURS PRN
Status: DISCONTINUED | OUTPATIENT
Start: 2018-04-30 | End: 2018-05-01 | Stop reason: HOSPADM

## 2018-04-30 RX ORDER — THIAMINE MONONITRATE (VIT B1) 100 MG
100 TABLET ORAL DAILY
Status: DISCONTINUED | OUTPATIENT
Start: 2018-04-30 | End: 2018-04-30

## 2018-04-30 RX ORDER — FAMOTIDINE 20 MG/1
20 TABLET, FILM COATED ORAL
Status: DISCONTINUED | OUTPATIENT
Start: 2018-04-30 | End: 2018-05-01 | Stop reason: HOSPADM

## 2018-04-30 RX ORDER — POTASSIUM CHLORIDE 20 MEQ/1
40 TABLET, EXTENDED RELEASE ORAL ONCE
Status: COMPLETED | OUTPATIENT
Start: 2018-04-30 | End: 2018-04-30

## 2018-04-30 RX ADMIN — BUDESONIDE AND FORMOTEROL FUMARATE DIHYDRATE 2 PUFF: 160; 4.5 AEROSOL RESPIRATORY (INHALATION) at 08:06

## 2018-04-30 RX ADMIN — OMEPRAZOLE 20 MG: 20 CAPSULE, DELAYED RELEASE ORAL at 08:05

## 2018-04-30 RX ADMIN — PREDNISOLONE 40 MG: 15 SOLUTION ORAL at 08:07

## 2018-04-30 RX ADMIN — POTASSIUM CHLORIDE 40 MEQ: 1500 TABLET, EXTENDED RELEASE ORAL at 08:05

## 2018-04-30 RX ADMIN — TIOTROPIUM BROMIDE 1 CAPSULE: 18 CAPSULE ORAL; RESPIRATORY (INHALATION) at 08:06

## 2018-04-30 RX ADMIN — FOLIC ACID 1 MG: 1 TABLET ORAL at 08:05

## 2018-04-30 RX ADMIN — ONDANSETRON 4 MG: 2 INJECTION, SOLUTION INTRAMUSCULAR; INTRAVENOUS at 08:29

## 2018-04-30 RX ADMIN — THERA TABS 1 TABLET: TAB at 08:06

## 2018-04-30 RX ADMIN — MIDODRINE HYDROCHLORIDE 10 MG: 5 TABLET ORAL at 08:05

## 2018-04-30 RX ADMIN — LACTULOSE 30 ML: 20 SOLUTION ORAL at 22:07

## 2018-04-30 RX ADMIN — LACTULOSE 30 ML: 20 SOLUTION ORAL at 08:04

## 2018-04-30 RX ADMIN — Medication 100 MG: at 08:05

## 2018-04-30 ASSESSMENT — PAIN SCALES - GENERAL
PAINLEVEL_OUTOF10: 0

## 2018-04-30 ASSESSMENT — ENCOUNTER SYMPTOMS
PALPITATIONS: 0
BRUISES/BLEEDS EASILY: 1
ROS GI COMMENTS: ANOREXIA
PND: 0
HEMOPTYSIS: 0
SPUTUM PRODUCTION: 0
EYE PAIN: 0
DEPRESSION: 0
CHILLS: 0
LOSS OF CONSCIOUSNESS: 0
STRIDOR: 0
COUGH: 0
HALLUCINATIONS: 0
WEAKNESS: 1
FOCAL WEAKNESS: 0
FEVER: 0
ABDOMINAL PAIN: 0
SHORTNESS OF BREATH: 0
NAUSEA: 1
COUGH: 1
WEIGHT LOSS: 0
VOMITING: 0
PHOTOPHOBIA: 0
HEADACHES: 0
SORE THROAT: 0
SEIZURES: 0
FALLS: 0
DIARRHEA: 0

## 2018-04-30 ASSESSMENT — COPD QUESTIONNAIRES
HAVE YOU SMOKED AT LEAST 100 CIGARETTES IN YOUR ENTIRE LIFE: YES
COPD SCREENING SCORE: 6
DO YOU EVER COUGH UP ANY MUCUS OR PHLEGM?: NO/ONLY WITH OCCASIONAL COLDS OR INFECTIONS
DURING THE PAST 4 WEEKS HOW MUCH DID YOU FEEL SHORT OF BREATH: SOME OF THE TIME

## 2018-04-30 NOTE — THERAPY
PT order received; Per medical chart, pt is now on comfort care. Will cancel PT eval order at this time as pt POC has changed. If pt POC is to change again, please re-order as needed.

## 2018-04-30 NOTE — DISCHARGE PLANNING
Anticipated Discharge Disposition: Home with Hospice     Action: [facility] has been arranged for 1030am for pt to d/c home with [facility] services for 5/1/18. AJ at [facility] notified of the discharge time.     Barriers to Discharge: None      Plan: Pt to discharge via [facility] at 1030am via [facility] 5/1/18 with [facility].

## 2018-04-30 NOTE — DIETARY
Nutrition Services: Brief Update    RD previously following pt for PO adequacy, pt is now Comfort Care status and nutrition intervention is no longer appropriate. Liberalizing diet to Regular, ok per MD.     Consult RD as needed, available PRN ad to rescreen per department policy.

## 2018-04-30 NOTE — PROGRESS NOTES
[facility] ICU Transfer Note    Admit Date:    4/22/2018    Primary Diagnosis:  Severe Alcoholic Hepatitis      ICU Course Summary (Brief Narrative):    77 year old female with PMH ox of Atrial fibrillationon apixaban, moderate alcohol intake, COPD not on home oxygen admitted 4/22/2018 after two days history of fatigue, dizziness, and jaundice, two months of generalized pruritus. Found to be hypotensive, and having acute liver failure, and BLAYNE, elevated liver enzymes, and bilirubin [, , alk phos 73, total bili 19.2, albumin 2.1, total protein 5.3, ammonia 51, INR 6.03, PTT 53.7, APTT 53]. Creatinine 2.08, GFR 23,  LA 4.4-> 2.3, improved with IV fluid.GI consulted, recommended basic supportive care and to IV Vit K. The patient was also treated with ceftriaxone & doxycycline for 5 days for sepsis secondary to pneumonia. Infection panels were unremarkable, SUMAN was positive at a titier of 1:80, F actin elevated 40, anti smooth muscle Ab was positive with a high titre [1:640], anti-giancarlo 9.0 (WNL), liver biopsy done 4/27/2018, not consistent with autoimmune hepatitis. GI thinks this is more with Severe alcoholic hepatitis (Maddrey Discriminant Function [MDF] score ?32). The patient has been started on prednisolone PO 40 mg daily on 4/28/2018. Palliative care consulted for advanced care planning. Patient and family want comfort care. They do not want prednisolone. Ok for lactulose to help with her elevated ammonia and hepatic encephalopathy. Hospice consulted. Patient and family want home hospice. Discussed with Dr. Azevedo and Dr. Wade.    Things to follow:   Recommendations from hospice consult.  Pending home hospice set up.  Removal of central line as appropriate.

## 2018-04-30 NOTE — DISCHARGE PLANNING
Discussed pt during AM Rounds. Pt has been transitioned to CC. Treating physician, Dr. Pearson. Pt has been transferred off the ICU floor to Ortho. TC to,Ortho [id number].  Informed them of the need for a Hospice referral.

## 2018-04-30 NOTE — PROGRESS NOTES
Gastroenterology Progress Note     Author: Mike Alejandro   Date & Time Created: 4/29/2018 5:02 PM    Chief Complaint:  Jaundice      Interval History:  Pt becoming more confused. Spoke to hospice nurse and family today. Sounds like they are leaning towards comfort care. N/V today in the afternoon. No abdominal pain, fever, chills, chest pain, SOB.     Review of Systems:  ROS    Physical Exam:  Physical Exam   Constitutional:   Pt appears very acutely ill    Eyes: Scleral icterus is present.   Cardiovascular: Normal rate and regular rhythm.    Pulmonary/Chest: Effort normal and breath sounds normal.   Abdominal: Soft. She exhibits no distension. There is no tenderness. There is no rebound and no guarding.   Musculoskeletal: She exhibits edema.   Neurological:   arousable but confused   Skin: Skin is warm.   jaundiced       Labs:        Invalid input(s): [id number]      Recent Labs      04/27/18 0415 04/28/18   0510  04/29/18   0515   SODIUM  138  139  144   POTASSIUM  4.0  4.0  3.7   CHLORIDE  111  111  113*   CO2  19*  21  22   BUN  45*  51*  51*   CREATININE  1.45*  1.69*  1.58*   CALCIUM  9.1  9.0  9.8     Recent Labs      04/27/18 0415 04/28/18   0510  04/29/18   0515   ALTSGPT  192*  149*  106*   ASTSGOT  174*  117*  85*   ALKPHOSPHAT  68  65  46   TBILIRUBIN  21.5*  19.6*  19.8*   GLUCOSE  129*  136*  102*     Recent Labs      04/27/18 0415 04/28/18   0510  04/29/18   0515   RBC  3.27*  3.12*  2.75*   HEMOGLOBIN  11.3*  10.8*  9.5*   HEMATOCRIT  32.7*  31.3*  27.4*   PLATELETCT  100*  71*  53*   PROTHROMBTM   --    --   38.1*   INR   --    --   3.91*     Recent Labs      04/27/18 0415 04/28/18   0510  04/29/18   0515   WBC  9.2  9.0  7.5   NEUTSPOLYS  88.20*  89.40*  87.90*   LYMPHOCYTES  2.80*  2.30*  2.80*   MONOCYTES  8.20  7.00  8.00   EOSINOPHILS  0.00  0.00  0.00   BASOPHILS  0.10  0.10  0.10   ASTSGOT  174*  117*  85*   ALTSGPT  192*  149*  106*   ALKPHOSPHAT  68  65  46   TBILIRUBIN   21.5*  19.6*  19.8*     Hemodynamics:  Temp (24hrs), Av.2 °C (97.1 °F), Min:35.8 °C (96.5 °F), Max:36.7 °C (98.1 °F)  Temperature: 36 °C (96.8 °F)  Pulse  Av.1  Min: 42  Max: 138Heart Rate (Monitored): (!) 107  NIBP: 144/82  CVP (mm Hg): (!) 11 MM HG  Respiratory:    Respiration: 18, Pulse Oximetry: 99 %        RUL Breath Sounds: Diminished, RML Breath Sounds: Diminished, RLL Breath Sounds: Diminished, ERICK Breath Sounds: Diminished, LLL Breath Sounds: Diminished  Fluids:    Intake/Output Summary (Last 24 hours) at 18 1702  Last data filed at 18 1600   Gross per 24 hour   Intake            130.2 ml   Output             1250 ml   Net          -1119.8 ml     Weight: 71.4 kg (157 lb 6.5 oz)  GI/Nutrition:  Orders Placed This Encounter   Procedures   • Diet Order     Standing Status:   Standing     Number of Occurrences:   1     Order Specific Question:   Diet:     Answer:   Hepatic [9]     Medical Decision Making, by Problem:  Active Hospital Problems    Diagnosis   • *Hepatic failure (HCC) [K72.90]   • Acute kidney injury (HCC) [N17.9]   • Lactic acidosis [E87.2]   • Hypotension [I95.9]   • Abnormal chest x-ray [R93.8]   • Atrial fibrillation (HCC) [I48.91]   • Compression fracture of L1 lumbar vertebra (HCC) [S32.010A]   • COPD (chronic obstructive pulmonary disease) (HCC) [J44.9]   • Anemia [D64.9]   • Coagulopathy (HCC) [D68.9]       Plan:  1.  EtOH hepatitis - MELD Na 37 (was 35 previously). Very high 90 d mortality rate 66% at least.   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 Spoke to Dr Higgins of [facility] - transplant hepatology, he agrees, only steroids if MDF >32, for EtOH hepatitis, not for AIH. Biopsies not diagnostic of AIH. Poor transplant candidate because of age & recent EtOH use.  Recommend comfort care, sounds like they are agreeing to that. We will sign off.   2. Transaminitis - see above  3. Acute liver failure - see above   4. Hyperbilirubinemia - see  above  5. Coagulopathy - see above  6. Call with any questions/concerns they may have.   Quality-Core Measures

## 2018-04-30 NOTE — PROGRESS NOTES
Hourly rounding, call bell within reach. Patient educated about plan of care, pain management, call bell use, and mobility. Patient turned q2h while in bed, very week. Barrier cream applied to red backside, mepilex in place. Family met with hospice today and patient to go home with hospice tomorrow after equipment is delivered. Central line to be removed prior to discharge per MD and hospice. Sanchez to be left in place for comfort care. Patient incontinent of bowel, cleansed PRN.

## 2018-04-30 NOTE — PROGRESS NOTES
[facility] Progress Note      Admit Date: 4/22/2018    Resident(s): Abdias Panchal  Attending: HAZEL AMOR/ Dr. Pearson    Date & Time:   4/30/2018   6:59 AM       Patient ID:    Name:             Rox Valdez   YOB: 1940   Age:                 77 y.o.  female   MRN:               [id number]    HPI:  77 year old female with PMH ox of Atrial fibrillationon apixaban, moderate alcohol intake, COPD not on home oxygen admitted 4/22/2018 after two days history of fatigue, dizziness, and jaundice, two months of generalized pruritus. Found to be hypotensive, and having BLAYNE, elevated liver enzymes, and bilirubin [, , alk phos 73, total bili 19.2, albumin 2.1, total protein 5.3, ammonia 51, INR 6.03, PTT 53.7, APTT 53]. Creatinine 2.08, GFR 23,  LA 4.4-> 2.3, improved with IV fluid.GI consulted, recommended basic supportive care and to IV Vit K. Infection panels were unremarkable, SUMAN was positive at a titier of 1:80, F actin elevated 40, anti-giancarlo 9.0 (WNL), liver biopsy done 4/27/2018, not consistent with autoimmune hepatitis. GI thinks this is more with Severe alcoholic hepatitis (Maddrey Discriminant Function [MDF] score ?32). The patient has been started on prednisolone PO 40 mg daily on 4/28/2018. Patient is a poor candidate for liver transplant. Palliative care consulted: patient and family wants to go for comfort care and hospice on 4/29/18. Pending Hospice consult.      Consultants:  PMA: Dr. Milton, Dr. Gonda, Dr. Pearson  GI: Jose Raul Dexter     Procedures:  CT guided liver biopsy 4/26    Interval Events:    4/30/18  Nausea, vomited after taking lactulose.   BLAYNE: Cr. 1.59, GFR 31  T bili 24.5  ,   Replace K.  Pending hospice consult extension [phone]  Keep lactulose to help with her mentation  Comfort care, reconciliate medications for comfort care, keep lactulose for her mentation  Remove central line  Pending floor transfer    4/29/2018   Heart rate  and blood pressure improving, AIFB, 80s-90's, off levo and vasso.    Cont digoxin 250 micros PO, monitor level given her renal function  Cont prednisolone for alcoholic hepatitis, omeprazole for stress ulcer Px.  Palliative care consulted: DNR/ comfort care, recommend hospice consult  Mostly transfer to the floor as she is off IV pressors. (no floor bed available)     4/28/2018 More alert today, AFIB 90s-130's. Cont digoxin 250 micros PO, monitor level given her renal function, will give her albumin 25 gm X 2 to improve her intravascular volume.  GI thinks this is more with Severe alcoholic hepatitis (Maddrey Discriminant Function [MDF] score ?32). Starting 4/28/2018 prednisolone PO 40 mg daily for 28 days, to be followed by a 2-week taper. Consulting palliative care for advanced care planning.    4/27/2018 Tolerated the liver Bx yesterday, back on low dose pressors [Nor epi 3, Vasso 0.03 ml/min] after getting pain medications. AFIB 's, starting digoxin load, check level tomorrow.     4/27/2018 No acute events overnight A&O x 3 this am - off pressors this monring- continue midodrine and stress-dose steroids, CVP 9-16- A.fib on monitor 's (130's with movement) asymptomatic - adequate urine output, non-bloody bowel movement.      Review of Systems   Constitutional: Positive for malaise/fatigue. Negative for chills and fever.   HENT: Negative for sore throat.    Eyes: Negative for photophobia and pain.        Jaundice    Respiratory: Negative for cough, hemoptysis, shortness of breath and stridor.    Cardiovascular: Positive for leg swelling. Negative for chest pain and palpitations.   Gastrointestinal: Negative for abdominal pain, diarrhea and vomiting.        Anorexia   Genitourinary: Negative for urgency.   Musculoskeletal: Negative for falls.   Skin:        Diffuse bruising  upper and lower extremities. Jaundice    Neurological: Positive for weakness. Negative for seizures and loss of consciousness.    Endo/Heme/Allergies: Bruises/bleeds easily.   Psychiatric/Behavioral: Negative for hallucinations.     Physical Exam   Constitutional: She is oriented to person, place, and time. She appears well-developed. No distress.   markedly jaundiced    HENT:   Head: Normocephalic and atraumatic.   Eyes: Pupils are equal, round, and reactive to light. Right eye exhibits no discharge. Left eye exhibits no discharge. Scleral icterus is present.   Neck: Normal range of motion. No JVD present.   Right IJ   Cardiovascular: Normal heart sounds.  Exam reveals no gallop and no friction rub.    No murmur heard.  Distant heart sounds, irregular rhythm     Pulmonary/Chest: Effort normal. No stridor. No respiratory distress. She has no wheezes.   Faint bibasal crackles, more on the right    Abdominal: Soft. Bowel sounds are normal. She exhibits no distension. There is no tenderness. There is no rebound and no guarding.   Musculoskeletal: Normal range of motion. She exhibits edema (B/L lower extremity ). She exhibits no tenderness or deformity.   Neurological: She is alert and oriented to person, place, and time. No cranial nerve deficit.   Skin: Skin is warm and dry. Capillary refill takes less than 2 seconds. Rash: Diffuse bruising and marked jaundice  She is not diaphoretic.   Jaundice    Psychiatric: She has a normal mood and affect. Her behavior is normal.     Respiratory:     Respiration: 18, Pulse Oximetry: 96 %    Chest Tube Drains:  NA        Invalid input(s): [id number]    HemoDynamics:  Pulse: 79, Heart Rate (Monitored): (!) 112 NIBP: 143/72 CVP (mm Hg): (!) 265 MM HG            Intake/Output Summary (Last 24 hours) at 04/30/18 0659  Last data filed at 04/30/18 0600   Gross per 24 hour   Intake              150 ml   Output             1165 ml   Net            -1015 ml       Weight: 71.2 kg (156 lb 15.5 oz)  Body mass index is 28.7 kg/m².    Recent Labs      04/28/18   0510  04/29/18   0515  04/30/18   0425   SODIUM  139   144  142   POTASSIUM  4.0  3.7  3.3*   CHLORIDE  111  113*  113*   CO2  21  22  23   BUN  51*  51*  53*   CREATININE  1.69*  1.58*  1.59*   CALCIUM  9.0  9.8  10.6*       GI/Nutrition:  Renal/hepatic diet   Recent Labs      18   0510  18   0515  18   0425   ALTSGPT  149*  106*  116*   ASTSGOT  117*  85*  101*   ALKPHOSPHAT  65  46  65   TBILIRUBIN  19.6*  19.8*  24.5*   GLUCOSE  136*  102*  127*     Heme:  Platelets decreased, TEG shows decreased reaction time   INR elevated 3.10, improved from admission, H/H stable   Recent Labs      18   0510  04/29/18   0515  18   042   RBC  3.12*  2.75*  3.46*   HEMOGLOBIN  10.8*  9.5*  11.6*   HEMATOCRIT  31.3*  27.4*  34.9*   PLATELETCT  71*  53*  71*   PROTHROMBTM   --   38.1*   --    INR   --   3.91*   --      Infectious Disease:    Ceftriaxone and doxy   Temp  Av.1 °C (96.9 °F)  Min: 35.9 °C (96.6 °F)  Max: 36.2 °C (97.2 °F)  Recent Labs      18   0515  04/30/18   0425   WBC  9.0  7.5  10.8   NEUTSPOLYS  89.40*  87.90*  83.80*   LYMPHOCYTES  2.30*  2.80*  2.60*   MONOCYTES  7.00  8.00  11.50   EOSINOPHILS  0.00  0.00  0.00   BASOPHILS  0.10  0.10  0.30   ASTSGOT  117*  85*  101*   ALTSGPT  149*  106*  116*   ALKPHOSPHAT  65  46  65   TBILIRUBIN  19.6*  19.8*  24.5*       Meds:  • potassium chloride SA  40 mEq     • multivitamin  1 Tab      And   • thiamine  100 mg      And   • folic acid  1 mg     • omeprazole  20 mg     • MD ALERT...adult comfort care       • HYDROmorphone  4 mg     • atropine  2 Drop     • artificial tears  2 Drop     • LORazepam  1 mg      Or   • LORazepam  1-2 mg      Or   • LORazepam  3-4 mg      Or   • LORazepam  5 mg     • digoxin  125 mcg     • prednisoLONE  40 mg     • lactulose  30 mL     • midodrine  10 mg     • traZODone  100 mg     • Respiratory Care per Protocol       • budesonide-formoterol  2 Puff     • tiotropium  1 Cap        Problem and Plan:  Hepatic failure (HCC)  Drinks 3  glasses of wine a day for many years.  Jaundice, fatigue, poor PO intake.    No fever, chills, abdominal pain, or signs/symptoms of GI bleeding.   Marked juandice and bruising. Mental status within normal limits.   No asterixis. No hepatic encephalopathy.      Initial labs , , alk phos 73, total bili 19.2, albumin 2.1, total protein 5.3, ammonia 51  INR 6.03, PTT 53.7, APTT 53. Ferritin very high, transferrin low. Acute hepatitis panel negative. HIV negative. TSH WNL.   Hep B Core total antibody, Hep C viral RNA QUALITATIVE,  Hep B surface antibody Neg.   TEG study unremarkable.  anti-giancarlo 9.0 (WNL)      SUMAN titier [1:80] and F-actin antibody [40], and smooth muscle AB are Positive consistent with autoimmune hepatitis [GI think this is false positive]. However liver biopsies do not show significantly increased numbers of plasma cells which can be associated with autoimmune hepatitis. [Path comment: Liver Bx: rule out alcoholic hepatitis withassociated cirrhosis. ]     Abdominal ultrasound suggests cirrhosis.    MELD score 40, MELD sodium 40, mortality 71.3 %  Child Valenzuela C  GI thinks this is more with Severe alcoholic hepatitis rather than autoimmune hepatitis      - AST, ALT improving, T. anni still high  - continue to hold home apixaban due to child valenzuela C and BLAYNE   - Renal/hepatic diet.   - GI thinks this is more with Severe alcoholic hepatitis (Maddrey Discriminant Function [MDF] score ?32).   -  prednisolone PO 40 mg daily started on 4/28/18-4/29/18 for ETOH hepatitis  - Omeprazole for stress ulcer prophylaxis. Has coagulopathy, low platelet count, INR) >1.5, and she is on steroids.    - Comfort care, patient and family does not want to continue steroid, but ok for lactulose to help with her mentation.      Acute kidney injury (HCC)  Poor PO intake, high BUN/Cr ratio, likely from dehydration.   Cr 2.08 (baseline Cr 0.85).    BUN/Cr consistent with pre-renal, Improving with re-hydration      Jese  2.3 %, however patient was on diuretics, UA hyaline casts, no active sediment    Renal ultrasound unremarkable.       Improving with re-hydration. Now off pressor  - holding home spironolactone, losartan.    - renally dose meds.    - comfort care since 4/29/18 per patient and family    Lactic acidosis  LA 4.4, improved to 1.6 with IV fluid.  -Resolved.        Hypotension  S/p IV fluid and pressor ( levo & vasopressin ).   Relative adrenal insufficiency, dehydration, liver failure.   -home BP meds [atenolol 50 mg, losartan 25 mg, spironolactone 25 mg] held  -was on prednisone for alcoholic hepatitis, was on Hydrocortisone, midodrine  -was on digoxin 250 micros PO given A fib.  -Comfort care since 4/29/18    Abnormal chest x-ray  - ? CAP,  unusual wedge-shaped opacification noted in right lower hemithorax adjacent to the right heart border. This could represent collapse of a portion of the right lung vs PNA. Underlying carcinoma is a possibility. Fibrosis pneumonia or loculated fluid are also possibilities. Minimal blunting of costophrenic angles bilaterally.    - received ceftriaxone x1 and doxycycline in ED   - Does not look infectious clinically however given low BP & elevated procalcitonin, there is concern for CAP and completed ceftriaxone and doxycycline 5 days total on 4/27/2018    - supportive   - comfort care per patient and family    COPD (chronic obstructive pulmonary disease) (HCC)  Not on home oxygen  PFT Aug 2017 Ratio 61% FEV1 63%, DLCO WNL   - O2, RT, continue home spiriva, symbicort       Compression fracture of L1 lumbar vertebra (HCC)  Moderate anterior wedge compression fracture on X ray  Hx of fall many days ago, patient does not remember when  - Fall precaution     - can use lidocaine patch for pain if needed [avoid NSAIDs]     Atrial fibrillation (HCC)  Chronic afib, on atenolol 50 mg, currently holding given her hypotension & apixaban at home.     - holding apixaban due to liver failure,  elevated INR and BLAYNE  - digoxin was given.  - comfort care since 4/29/18 per patient and family      Coagulopathy (HCC)  No signs of active bleeding   Mostly related to liver disease   -Omeprazole for stress ulcer prophylaxis. Has coagulopathy, low platelet count, INR) >1.5, and she is on steroids.    -CTM for signs of bleeding     Anemia  Hb stable around 9.5-10.5  No signs of active bleeding     -Omeprazole was given for stress ulcer prophylaxis. Has coagulopathy, low platelet count, INR) >1.5.  - comfort care since 4/29/18 per patient and family      Quality Measures:  Lines:   Right IJ, PIV  Sanchez Catheter: Yes, critically ill for I&O   DVT Prophylaxis:  SCDs, high risk of bleeding   Ulcer Prophylaxis: Omeprazole.  Antibiotics:   s/p Ceftriaxone/ Doxy, thru 4/27/2018     CODE STATUS:  DNR / Comfort care, hospice  DISPO:   ICU

## 2018-04-30 NOTE — PROGRESS NOTES
Pulmonary Critical Care Progress Note        Admit:  4/22/2018    Chief Complaint:  Weakness/fatigue - low Bp/hepatic and renal failure    History of Present Illness:    This is a 77-year-old lady   with an extensive past medical history.  She comes in to the emergency room   complaining of weakness and fatigue.  She has been having problems with   generalized pruritus for quite some time, but it has become worse for the last   several days.  She has been getting dizzy when she stands for the last 3 days   and about 3 days ago her  noted that she was becoming jaundiced.  She   came in to the emergency department.  In the field, her blood pressure was in   60s systolic.  She is receiving intravenous crystalloid solution.     She admits to drinking 3 glasses of white wine a day, which she has done for   many years.  She occasionally will take acetaminophen for pain, but does not   abuse it.  She has no known history of liver disease.    Review of Systems   Constitutional: Positive for malaise/fatigue. Negative for fever and weight loss.   Respiratory: Positive for cough. Negative for sputum production and shortness of breath.    Cardiovascular: Positive for leg swelling. Negative for chest pain, palpitations and PND.   Gastrointestinal: Positive for nausea. Negative for abdominal pain and vomiting.   Skin:        Persistent jaundice   Neurological: Positive for weakness. Negative for focal weakness and headaches.   Endo/Heme/Allergies: Bruises/bleeds easily.   Psychiatric/Behavioral: Negative for depression.   All other systems reviewed and are negative.      Interval Events:  24 hour interval history reviewed   Tm 97.2  -1L over last 24hr  No cxr this am  Pain controlled at present  Family concerned why no private room  Pt still getting lab draws and unnecessary meds      PFSH:  No change.    Physical Exam   Constitutional: She is oriented to person, place, and time. No distress.   HENT:   Head: Normocephalic  and atraumatic.   Mouth/Throat: No oropharyngeal exudate.   Eyes: EOM are normal. Pupils are equal, round, and reactive to light. Scleral icterus is present.   Neck: Neck supple. No JVD present.   Cardiovascular: Normal rate and intact distal pulses.  An irregularly irregular rhythm present.   No murmur heard.  Pulmonary/Chest: Effort normal. No stridor. No respiratory distress. She has no wheezes. She has rales.   Abdominal: She exhibits distension. There is no tenderness.   Musculoskeletal: She exhibits edema. She exhibits no tenderness.   Neurological: She is oriented to person, place, and time. No cranial nerve deficit.   Skin: Skin is dry. She is not diaphoretic. There is pallor.   Jaundiced, cool   Psychiatric: She has a normal mood and affect. Her behavior is normal.   Nursing note and vitals reviewed.      Respiratory:      Pulse Oximetry: 96 %                   Invalid input(s): [id number]    HemoDynamics:  Pulse: 79, Heart Rate (Monitored): (!) 112  NIBP: 143/72  CVP (mm Hg): (!) 265 MM HG     Imaging: Available data reviewed    Echocardiogram 4/24: CONCLUSIONS  No prior study is available for comparison.   Left ventricular ejection fraction is visually estimated to be 70%.  Severely dilated left atrium.  Enlarged right atrium.  Estimated right ventricular systolic pressure  is 35 mmHg.        Neuro:  GCS Total [place] Coma Score: 14       Imaging: Available data reviewed    Fluids:  Intake/Output       04/28/18 0700 - 04/29/18 0659 04/29/18 0700 - 04/30/18 0659 04/30/18 0700 - 05/01/18 0659      7045-8705 3827-3162 Total 6277-1070 1795-4679 Total 4679-4082 1325-3349 Total       Intake    P.O.  100  -- 100  90  60 150  --  -- --    P.O. 100 -- 100 90 60 150 -- -- --    I.V.  105.2  22.2 127.4  --  -- --  --  -- --    Vasopressin Volume 90 22.2 112.2 -- -- -- -- -- --    Norepinephrine Volume 15.2 -- 15.2 -- -- -- -- -- --    Total Intake 205.2 22.2 227.4 90 60 150 -- -- --       Output    Urine  510   610 1120  520  545 1065  --  -- --    Output (mL) (Urinary Catheter Coudé;Indwelling Catheter 12F)   -- -- --    Emesis  --  -- --  --  -- --  --  -- --    Emesis - Number of Times -- -- -- 1 x -- 1 x -- -- --    Other  --  -- --  100  -- 100  --  -- --    Other -- -- -- 100 -- 100 -- -- --    Stool  --  0 0  --  -- --  --  -- --    Number of Times Stooled 1 x 0 x 1 x -- 1 x 1 x -- -- --    Measurable Stool (mL) -- 0 0 -- -- -- -- -- --    Total Output   -- -- --       Net I/O     -304.8 -587.8 -892.6 -530 -485 -1015 -- -- --        Weight: 71.2 kg (156 lb 15.5 oz)  Recent Labs      18   0510  04/29/18   0515  18   0425   SODIUM  139  144  142   POTASSIUM  4.0  3.7  3.3*   CHLORIDE  111  113*  113*   CO2  21  22  23   BUN  51*  51*  53*   CREATININE  1.69*  1.58*  1.59*   CALCIUM  9.0  9.8  10.6*       GI/Nutrition:    Imaging: Available data reviewed    Abdominal ultrasound :  1.  Lobular liver surface and heterogeneous liver echogenicity are consistent with cirrhosis.    2.  Post cholecystectomy.    3.  No biliary ductal dilatation appreciated.      Liver Function  Recent Labs      18   0510  04/29/18   0515  18   0425   ALTSGPT  149*  106*  116*   ASTSGOT  117*  85*  101*   ALKPHOSPHAT  65  46  65   TBILIRUBIN  19.6*  19.8*  24.5*   GLUCOSE  136*  102*  127*       Heme:  Recent Labs      18   0510  18   0515  18   0425   RBC  3.12*  2.75*  3.46*   HEMOGLOBIN  10.8*  9.5*  11.6*   HEMATOCRIT  31.3*  27.4*  34.9*   PLATELETCT  71*  53*  71*   PROTHROMBTM   --   38.1*   --    INR   --   3.91*   --        Infectious Disease:  Temp  Av.1 °C (96.9 °F)  Min: 35.9 °C (96.6 °F)  Max: 36.2 °C (97.2 °F)  Micro: reviewed     Recent Labs      18   0510  18   0515  18   0425   WBC  9.0  7.5  10.8   NEUTSPOLYS  89.40*  87.90*  83.80*   LYMPHOCYTES  2.30*  2.80*  2.60*   MONOCYTES  7.00  8.00  11.50   EOSINOPHILS   0.00  0.00  0.00   BASOPHILS  0.10  0.10  0.30   ASTSGOT  117*  85*  101*   ALTSGPT  149*  106*  116*   ALKPHOSPHAT  65  46  65   TBILIRUBIN  19.6*  19.8*  24.5*     Current Facility-Administered Medications   Medication Dose Frequency Provider Last Rate Last Dose   • potassium chloride SA (Kdur) tablet 40 mEq  40 mEq Once May Wright M.D.       • multivitamin (THERAGRAN) tablet 1 Tab  1 Tab DAILY Marie Panchal M.D.        And   • thiamine tablet 100 mg  100 mg DAILY Marie Panchal M.D.        And   • folic acid (FOLVITE) tablet 1 mg  1 mg DAILY Marie Panchal M.D.       • omeprazole (PRILOSEC) capsule 20 mg  20 mg DAILY Liborio Oliver M.D.   20 mg at 04/29/18 1122   • MD ALERT...adult comfort care   PRN Jeremy M Gonda, M.D.       • HYDROmorphone (DILAUDID) injection 4 mg  4 mg Q HOUR PRN Jeremy M Gonda, M.D.       • atropine 1 % ophthalmic solution 2 Drop  2 Drop Q4HRS PRN Jeremy M Gonda, M.D.       • artificial tears 1.4 % ophthalmic solution 2 Drop  2 Drop Q6HRS PRN Jeremy M Gonda, M.D.       • LORazepam (ATIVAN) 2 MG/ML oral conc 1 mg  1 mg Q HOUR PRN Jeremy M Gonda, M.D.        Or   • LORazepam (ATIVAN) injection 1-2 mg  1-2 mg Q HOUR PRN Jeremy M Gonda, M.D.        Or   • LORazepam (ATIVAN) injection 3-4 mg  3-4 mg Q HOUR PRN Jeremy M Gonda, M.D.        Or   • LORazepam (ATIVAN) injection 5 mg  5 mg Q HOUR PRN Jeremy M Gonda, M.D.       • digoxin (LANOXIN) tablet 125 mcg  125 mcg DAILY AT 1800 Liborio Oliver M.D.   125 mcg at 04/29/18 1740   • prednisoLONE (PRELONE) 15 MG/5ML syrup 40 mg  40 mg DAILY Liborio Oliver M.D.   40 mg at 04/29/18 0757   • lactulose 20 GM/30ML solution 30 mL  30 mL TID Marilyn Gupta M.D.   30 mL at 04/29/18 1739   • midodrine (PROAMATINE) tablet 10 mg  10 mg TID WITH MEALS Jeremy M Gonda, M.D.   10 mg at 04/29/18 1740   • traZODone (DESYREL) tablet 100 mg  100 mg QHS Jasper Torres M.D.   Stopped at 04/27/18 2100   • Respiratory Care  per Protocol   Continuous RT Marie Panchal M.D.       • budesonide-formoterol (SYMBICORT) 160-4.5 MCG/ACT inhaler 2 Puff  2 Puff BID Marie Panchal M.D.   2 Puff at 04/29/18 2111   • tiotropium (SPIRIVA) 18 MCG inhalation capsule 1 Cap  1 Cap DAILY Marie Panchal M.D.   1 Cap at 04/29/18 0757     Last reviewed on 4/22/2018  9:51 PM by Rachna Smith R.N.    Quality  Measures:  Labs reviewed, Radiology images reviewed and Medications reviewed                      Problems/plan:  Undifferentiated shock   - resolved   - Continue midodrine at current dose   - s/p Antibiotics/sepsis protocol    Acute on chronic liver failure - ETOH s/p biopsy (serology false (+))   - GI following, elevated discriminatory function --> prednisolone   - Biopsy with cirrhosis    Acute kidney injury - unchanged   - Avoid nephrotoxins, monitor urine output, maintain perfusion    Hyponatremia - improved    Intravascular volume depletion - resolved S/P resuscitation    Abnormal chest x-ray concerning for pneumonia -cultures negative   -Completed antibiotics 4/27    Anemia   - no further lab draws    Moderate stage II COPD w/o acute exacerbation   - O2/RT protocols, bronchodilators as needed    History of systemic arterial hypertension - currently hypotensive   - Continue holding medications    Chronic Atrial fibrillation with RVR -improved rate again today   - Rate controlled     - continue with digoxin    Cor pulmonale    Pancytopenia    - no further labs    Discussed patient condition and risk of morbidity and/or mortality with RN, RT, Pharmacy, [employer] resident, Charge nurse / hot rounds and Patient

## 2018-04-30 NOTE — PROGRESS NOTES
Patient arrived to floor, vital signs stable. Patient arouseable but very lethargic. Family at bedside. 2 RN skin check: patient has generalized pitting (+2-3) edema over entire body. Scattered bruises and blisters, open blisters to inside of thighs, no drainage. Sacrum is red and barely blanchable, mepilex in place. Central line to right chest, clean and without sign of infection. Dressing to mid upper abdomen were biopsy was done. Small abrasion to left ear lobe, scabbed over. Overall jaundice.

## 2018-04-30 NOTE — DISCHARGE PLANNING
Anticipated Discharge Disposition: Home with Hospice    Action: SW met with pt's spouse and family at bedside. Pt's spouse was provided hospice choice form and he chose [facility]. SW left a message for AJ their liaison per pt's spouse request as he and his family have questions for them. [facility] will send hospice referral to [facility] per request.     Barriers to Discharge: None     Plan: SW to monitor for hospice acceptance

## 2018-04-30 NOTE — DISCHARGE SUMMARY
PATIENT SUMMARY     Admit Date:  4/22/2018       Discharge Date:   5/01/2018    Service:   [facility] Internal Medicine Blue Team  Attending Physician(s):   Dr. Buck, Dr. Pearson     Senior Resident(s):   Dr. Ansley Ignacio  [place] Resident(s):   Dr. Azevedo      Primary Diagnosis:   Severe alcoholic hepatitis  Acute kidney injury  Hypotension    Secondary Diagnoses:                Chronic obstructive pulmonary disease  Community acquired pneumonia  Compression fracture of L1 lumbar vertebra  Atrial fibrillation  Coagulopathy  Macrocytic anemia     Hospital Summary (Brief Narrative):         77 year old female with PMH ox of Atrial fibrillation on apixaban, moderate alcohol intake, COPD not on home oxygen admitted 4/22/2018 after two days history of fatigue, dizziness, and jaundice, two months of generalized pruritus. Found to be hypotensive, and having BLAYNE, elevated liver enzymes, and bilirubin [, , alk phos 73, total bili 19.2, albumin 2.1, total protein 5.3, ammonia 51, INR 6.03, PTT 53.7, APTT 53]. Creatinine 2.08, GFR 23,  LA 4.4-> 2.3, improved with IV fluid.GI consulted, recommended basic supportive care and IV Vit K given. Infection panels were unremarkable, SUMAN was positive at a titier of 1:80, F actin elevated 40, anti-giancarlo 9.0 (WNL), liver biopsy done 4/27/2018, not consistent with autoimmune hepatitis. GI thinks this is more with severe alcoholic hepatitis (Maddrey Discriminant Function [MDF] score ?32). The patient was started on prednisolone PO 40 mg daily on 4/28/2018. However, patient does not improve. Patient is a poor candidate for liver transplant and patient was still drinking wine before the admission. Palliative care consulted: patient and family wants to go for comfort care and hospice on 4/29/18. Patient is accepted for hospice at home.     Patient /Hospital Summary (Details -- Problem Oriented) :        * Hepatic failure (HCC)- (present on admission)   Assessment & Plan    Drinks 3  glasses of wine a day for many years.  Jaundice, fatigue, poor PO intake.    No fever, chills, abdominal pain, or signs/symptoms of GI bleeding.   Marked juandice and bruising. Mental status within normal limits.   No asterixis. No hepatic encephalopathy.      Initial labs , , alk phos 73, total bili 19.2, albumin 2.1, total protein 5.3, ammonia 51  INR 6.03, PTT 53.7, APTT 53. Ferritin very high, transferrin low. Acute hepatitis panel negative. HIV negative. TSH WNL.   Hep B Core total antibody, Hep C viral RNA QUALITATIVE,  Hep B surface antibody Neg.   TEG study unremarkable.  anti-giancarlo 9.0 (WNL)      SUMAN titier [1:80] and F-actin antibody [40], and smooth muscle AB are Positive consistent with autoimmune hepatitis [GI think this is false positive]. However liver biopsies do not show significantly increased numbers of plasma cells which can be associated with autoimmune hepatitis. [Path comment: Liver Bx: rule out alcoholic hepatitis withassociated cirrhosis. ]     Abdominal ultrasound suggests cirrhosis.    MELD score 40, MELD sodium 40, mortality 71.3 %  Child Valenzuela C  GI thinks this is more with Severe alcoholic hepatitis rather than autoimmune hepatitis      - AST, ALT improving, T. anni still high  - continue to hold home apixaban due to child valenzuela C and BLAYNE   - Renal/hepatic diet.   - GI thinks this is more with Severe alcoholic hepatitis (Maddrey Discriminant Function [MDF] score ?32).   -  prednisolone PO 40 mg daily started on 4/28/18-4/29/18 for ETOH hepatitis  - Omeprazole for stress ulcer prophylaxis. Has coagulopathy, low platelet count, INR) >1.5, and she is on steroids.    - Comfort care, patient and family does not want to continue steroid, but ok for lactulose to help with her mentation.          Acute kidney injury (HCC)- (present on admission)   Assessment & Plan    Poor PO intake, high BUN/Cr ratio, likely from dehydration.   Cr 2.08 (baseline Cr 0.85).    BUN/Cr consistent with  pre-renal, Improving with re-hydration      Jese 2.3 %, however patient was on diuretics, UA hyaline casts, no active sediment    Renal ultrasound unremarkable.       Improving with re-hydration. Now off pressor  - holding home spironolactone, losartan.    - renally dose meds.    - comfort care since 4/29/18 per patient and family        Abnormal chest x-ray- (present on admission)   Assessment & Plan    - ? CAP,  unusual wedge-shaped opacification noted in right lower hemithorax adjacent to the right heart border. This could represent collapse of a portion of the right lung vs PNA. Underlying carcinoma is a possibility. Fibrosis pneumonia or loculated fluid are also possibilities. Minimal blunting of costophrenic angles bilaterally.    - received ceftriaxone x1 and doxycycline in ED   - Does not look infectious clinically however given low BP & elevated procalcitonin, there is concern for CAP and completed ceftriaxone and doxycycline 5 days total on 4/27/2018    - supportive   - comfort care per patient and family        Hypotension   Assessment & Plan    S/p IV fluid and pressor ( levo & vasopressin ).   Relative adrenal insufficiency, dehydration, liver failure.   -home BP meds [atenolol 50 mg, losartan 25 mg, spironolactone 25 mg] held  -was on prednisone for alcoholic hepatitis, was on Hydrocortisone, midodrine  -was on digoxin 250 micros PO given A fib.  -Comfort care since 4/29/18        Lactic acidosis- (present on admission)   Assessment & Plan    LA 4.4, improved to 1.6 with IV fluid.  -Resolved.            Atrial fibrillation (HCC)- (present on admission)   Assessment & Plan    Chronic afib, on atenolol 50 mg, currently holding given her hypotension & apixaban at home.     - holding apixaban due to liver failure, elevated INR and BLAYNE  - digoxin was given.  - comfort care since 4/29/18 per patient and family          Coagulopathy (HCC)- (present on admission)   Assessment & Plan    No signs of active  bleeding   Mostly related to liver disease   -Omeprazole for stress ulcer prophylaxis. Has coagulopathy, low platelet count, INR) >1.5, and she is on steroids.    -CTM for signs of bleeding         Anemia- (present on admission)   Assessment & Plan    Hb stable around 9.5-10.5  No signs of active bleeding     -Omeprazole was given for stress ulcer prophylaxis. Has coagulopathy, low platelet count, INR) >1.5.  - comfort care since 4/29/18 per patient and family          Compression fracture of L1 lumbar vertebra (HCC)- (present on admission)   Assessment & Plan    Moderate anterior wedge compression fracture on X ray  Hx of fall many days ago, patient does not remember when  - Fall precaution     - can use lidocaine patch for pain if needed [avoid NSAIDs]         COPD (chronic obstructive pulmonary disease) (HCC)- (present on admission)   Assessment & Plan    Not on home oxygen  PFT Aug 2017 Ratio 61% FEV1 63%, DLCO WNL   - O2, RT, continue home spiriva, symbicort               Consultants:      PMA: Dr. Milton, Dr. Gonda, Dr. Pearson  GI: Dr. Jose Raul Dexter, Dr. Mike Alejandro          Procedures:        CT guided liver biopsy on 4/26/18  Central line placement on 4/23/2018    Imaging/ Testing:      Liver Biopsy  A. Liver mass biopsy cores:         Three core biopsies of benign liver tissue demonstrating          cirrhosis with moderate inflammatory activity and demonstrating          the following features:         -There are several cirrhotic nodules surrounded by fibrous bands          showing proliferating bile duct structures. There is associated          moderate chronic inflammation within the fibrous bands showing          predominantly small lymphocytes along with a few plasma cells          and neutrophils. Several pigmented histiocytes are also noted.          There is mild chiefly macrosteatosis within the nodules of          hepatocytes and is estimated at approximately 3-5%. Cholestasis          is  noted within the hepatocytes.         -A trichrome stained slide highlights the fibrous bands          surrounding the cirrhotic nodules.         -A reticulin stained slide highlights the lobular architecture          and increased areas of fibrosis.         -An iron stained slide demonstrates absence of iron staining.         -No malignancy is seen.  CT-NEEDLE BX-LIVER   Final Result      CT GUIDED LIVER BIOPSY.      ECHOCARDIOGRAM COMP W/O CONT   Final Result      DX-CHEST-PORTABLE (1 VIEW)   Final Result      Right internal jugular line tip overlies the SVC with tip at the cavoatrial juncture. No pneumothorax.   Increasing lung base interstitial edema.      US-RENAL   Final Result      No hydronephrosis      DX-CHEST-PORTABLE (1 VIEW)   Final Result      Mild lung base atelectasis/interstitial edema.      US-GALLBLADDER   Final Result      1.  Lobular liver surface and heterogeneous liver echogenicity are consistent with cirrhosis.      2.  Post cholecystectomy.      3.  No biliary ductal dilatation appreciated.         DX-CHEST-PORTABLE (1 VIEW)   Final Result         1.  Unusual wedge-shaped opacification noted in right lower hemithorax adjacent to the right heart border. This could represent collapse of a portion of the right lung. Underlying carcinoma is a possibility. Fibrosis pneumonia or loculated fluid are also    possibilities.      2.  Minimal blunting of costophrenic angles bilaterally.      DX-LUMBAR SPINE-2 OR 3 VIEWS   Final Result      1.  Moderate anterior wedge compression fracture mainly affecting the superior endplate of L1 is identified with some retropulsion as described above. This could potentially represent a chronic or old traumatic fracture or insufficiency fracture. Acute    fracture is probably less likely given smoothly marginated deformed superior endplate.      2.  Moderate degenerative disc disease and facet arthropathy.      CT-HEAD W/O   Final Result      No acute intracranial  abnormality is identified.      Atrophy      There are mild periventricular and subcortical white matter changes present.  This finding is nonspecific and could be from previous small vessel ischemia, demyelination, or gliosis.            Discharge Medications:        Medication Reconciliation Completed       Medication List      START taking these medications      Instructions   artificial tears 1.4 % Soln   Place 2 Drops in both eyes every 6 hours as needed (Dry eyes   ).  Dose:  2 Drop     atropine 1 % Soln   2 Drops every four hours as needed (for excessive secretions)     Divalproex Sodium 125 MG Csdr  Commonly known as:  DEPAKOTE   Doctor's comments:  For agitation  Take 1 Cap by mouth 2 Times a Day.  Dose:  125 mg     famotidine 20 MG Tabs  Commonly known as:  PEPCID   Take 1 Tab by mouth 1 time daily as needed (heart burn).  Dose:  20 mg     lactulose 20 GM/30ML Soln   Take 30 mL by mouth 3 times a day.  Dose:  30 mL     ondansetron 4 MG Tbdp  Commonly known as:  ZOFRAN ODT   Take 1 Tab by mouth every four hours as needed for Nausea for up to 30 days.  Dose:  4 mg        CONTINUE taking these medications      Instructions   budesonide-formoterol 160-4.5 MCG/ACT Aero  Commonly known as:  SYMBICORT   Inhale 2 Puffs by mouth 2 Times a Day.  Dose:  2 Puff     diphenhydrAMINE 25 MG Tabs  Commonly known as:  BENADRYL   Take 25 mg by mouth every 6 hours as needed for Sleep.  Dose:  25 mg     Melatonin 1 MG Tabs   Take  by mouth.     tiotropium 18 MCG Caps  Commonly known as:  SPIRIVA HANDIHALER   Use 1 capsule inhaled from handihaler every day.     TYLENOL PM EXTRA STRENGTH PO   Take  by mouth every evening.        STOP taking these medications    atenolol 50 MG Tabs  Commonly known as:  TENORMIN     ELIQUIS 5mg Tabs  Generic drug:  apixaban     losartan 25 MG Tabs  Commonly known as:  COZAAR     spironolactone 25 MG Tabs  Commonly known as:  ALDACTONE            Disposition:   Home with hospice    Diet:   Hepatic,  "renal diet preferably. Given comfort care, she could have what she prefers.    Activity:   As tolerated    Instructions:      Comfort care  Home with hospice    The patient was instructed to return to the ER in the event of worsening symptoms. I have counseled the patient on the importance of compliance and the patient has agreed to proceed with all medical recommendations and follow up plan indicated above.   The patient understands that all medications come with benefits and risks. Risks may include permanent injury or death and these risks can be minimized with close reassessment and monitoring.        Primary Care Provider:    Pastor Lopez M.D.    Follow up appointment details :      none    Pending Studies:        none    Time spent on discharge day patient visit, preparing discharge paperwork and arranging for patient follow up.    (yes)  Greater than 30 minutes      ______________________________________________________________________    Interval history/exam for day of discharge:    \"I'm fine\"    Vitals:    04/30/18 0400 04/30/18 0500 04/30/18 0600 04/30/18 0800   BP:       Pulse: 98 90 79 79   Resp:       Temp:       SpO2: 96% 96% 96% 97%   Weight: 71.2 kg (156 lb 15.5 oz)      Height:         Weight/BMI: Body mass index is 28.7 kg/m².  Pulse Oximetry: 97 %, O2 (LPM): 0, O2 Delivery: None (Room Air)    General: somnolent, jaundiced  CVS: tachycardic, regular rhythm  PULM: no respiratory distress    Most Recent Labs:    Lab Results   Component Value Date/Time    WBC 10.8 04/30/2018 04:25 AM    RBC 3.46 (L) 04/30/2018 04:25 AM    HEMOGLOBIN 11.6 (L) 04/30/2018 04:25 AM    HEMATOCRIT 34.9 (L) 04/30/2018 04:25 AM    .9 (H) 04/30/2018 04:25 AM    MCH 33.5 (H) 04/30/2018 04:25 AM    MCHC 33.2 (L) 04/30/2018 04:25 AM    MPV 11.9 04/30/2018 04:25 AM    NEUTSPOLYS 83.80 (H) 04/30/2018 04:25 AM    LYMPHOCYTES 2.60 (L) 04/30/2018 04:25 AM    MONOCYTES 11.50 04/30/2018 04:25 AM    EOSINOPHILS 0.00 " 04/30/2018 04:25 AM    BASOPHILS 0.30 04/30/2018 04:25 AM    ANISOCYTOSIS 2+ 04/28/2018 05:10 AM      Lab Results   Component Value Date/Time    SODIUM 142 04/30/2018 04:25 AM    POTASSIUM 3.3 (L) 04/30/2018 04:25 AM    CHLORIDE 113 (H) 04/30/2018 04:25 AM    CO2 23 04/30/2018 04:25 AM    GLUCOSE 127 (H) 04/30/2018 04:25 AM    BUN 53 (H) 04/30/2018 04:25 AM    CREATININE 1.59 (H) 04/30/2018 04:25 AM      Lab Results   Component Value Date/Time    ALTSGPT 116 (H) 04/30/2018 04:25 AM    ASTSGOT 101 (H) 04/30/2018 04:25 AM    ALKPHOSPHAT 65 04/30/2018 04:25 AM    TBILIRUBIN 24.5 (H) 04/30/2018 04:25 AM    DBILIRUBIN 0.5 07/28/2017 09:35 AM    LIPASE 29 08/01/2017 12:07 PM    ALBUMIN 2.7 (L) 04/30/2018 04:25 AM    ALBUMIN 3.26 (L) 03/26/2018 01:19 PM    GLOBULIN 2.4 04/30/2018 04:25 AM    INR 3.91 (H) 04/29/2018 05:15 AM    MACROCYTOSIS 2+ 04/28/2018 05:10 AM     Lab Results   Component Value Date/Time    PROTHROMBTM 38.1 (H) 04/29/2018 05:15 AM    INR 3.91 (H) 04/29/2018 05:15 AM

## 2018-04-30 NOTE — PROGRESS NOTES
Report called to Lizett yee T3, RN denies any questions at this time, family at bedside and updated on plan of care and denies any questions. Pt belongings placed with family an pt and pt awaits transport.

## 2018-04-30 NOTE — CARE PLAN
Problem: Communication  Goal: The ability to communicate needs accurately and effectively will improve  Oriented patient and patient's family members to staff, environment and current plan of care. Discussed possibility of patient transferring out of the ICU throughout the night and discussed expectations. Questions/concerns addressed     Problem: Pain Management  Goal: Pain level will decrease to patient's comfort goal  Comfort measures in place. Discussed interventions to decrease pain including pain meds per MAR, relaxation techniques and repositioning. Assessing and monitoring pain level Q2h. Established comfort levels and goals with patient.

## 2018-04-30 NOTE — DISCHARGE PLANNING
Received PCS form, spoke to Efrem at [facility], transportation set up for 5/1/18 for 1030 via [facility]. [facility] auth # [id number]

## 2018-04-30 NOTE — PALLIATIVE CARE
PALLIATIVE CARE FOLLOW-UP:    Pt's spouse and son meeting with Geronimo CATHERINE.  Provided materials regarding child and teen grief to Ramsey Hunter.  Ramsey Beltran. Signed DNR/comfort-focused POLST.  POLST placed on chart for [employer] MD completion.      Pt still has central line and PRN IV meds ordered.    Discussed with/updated:  , RN Ama    Plan:  Home with [facility]    Thank you for allowing Palliative Care to support this pt and her family.  Contact [email or web address] for additional assistance, questions or concerns.

## 2018-05-01 VITALS
TEMPERATURE: 97.5 F | RESPIRATION RATE: 14 BRPM | SYSTOLIC BLOOD PRESSURE: 116 MMHG | HEART RATE: 110 BPM | BODY MASS INDEX: 28.89 KG/M2 | HEIGHT: 62 IN | WEIGHT: 156.97 LBS | DIASTOLIC BLOOD PRESSURE: 75 MMHG | OXYGEN SATURATION: 91 %

## 2018-05-01 LAB — SOLUBLE LIVER IGG SER IA-ACNC: 2.5 U (ref 0–24.9)

## 2018-05-01 PROCEDURE — 99239 HOSP IP/OBS DSCHRG MGMT >30: CPT | Performed by: INTERNAL MEDICINE

## 2018-05-01 PROCEDURE — 700102 HCHG RX REV CODE 250 W/ 637 OVERRIDE(OP): Performed by: STUDENT IN AN ORGANIZED HEALTH CARE EDUCATION/TRAINING PROGRAM

## 2018-05-01 PROCEDURE — A9270 NON-COVERED ITEM OR SERVICE: HCPCS | Performed by: STUDENT IN AN ORGANIZED HEALTH CARE EDUCATION/TRAINING PROGRAM

## 2018-05-01 RX ORDER — POLYVINYL ALCOHOL 14 MG/ML
2 SOLUTION/ DROPS OPHTHALMIC EVERY 6 HOURS PRN
Qty: 1 BOTTLE | Refills: 3 | Status: SHIPPED | OUTPATIENT
Start: 2018-05-01

## 2018-05-01 RX ORDER — ATROPINE SULFATE 10 MG/ML
SOLUTION/ DROPS OPHTHALMIC
Qty: 1 BOTTLE | Refills: 0 | Status: SHIPPED | OUTPATIENT
Start: 2018-05-01

## 2018-05-01 RX ORDER — FAMOTIDINE 20 MG/1
20 TABLET, FILM COATED ORAL
Qty: 60 TAB | Refills: 0 | Status: SHIPPED | OUTPATIENT
Start: 2018-05-01

## 2018-05-01 RX ORDER — DIVALPROEX SODIUM 125 MG/1
125 CAPSULE, COATED PELLETS ORAL 2 TIMES DAILY
Qty: 120 CAP | Refills: 0 | Status: SHIPPED | OUTPATIENT
Start: 2018-05-01

## 2018-05-01 RX ORDER — LACTULOSE 20 G/30ML
30 SOLUTION ORAL 3 TIMES DAILY
Qty: 30 EACH | Refills: 0 | Status: SHIPPED | OUTPATIENT
Start: 2018-05-01

## 2018-05-01 RX ORDER — ONDANSETRON 4 MG/1
4 TABLET, ORALLY DISINTEGRATING ORAL EVERY 4 HOURS PRN
Qty: 120 TAB | Refills: 0 | Status: SHIPPED | OUTPATIENT
Start: 2018-05-01 | End: 2018-05-31

## 2018-05-01 RX ADMIN — LACTULOSE 30 ML: 20 SOLUTION ORAL at 09:48

## 2018-05-01 ASSESSMENT — PAIN SCALES - GENERAL: PAINLEVEL_OUTOF10: 0

## 2018-05-01 NOTE — DISCHARGE INSTRUCTIONS
Discharge Instructions    Discharged to home by ambulance with escort. Discharged via ambulance, hospital escort: Yes.  Special equipment needed: Not Applicable    Be sure to schedule a follow-up appointment with your primary care doctor or any specialists as instructed.     Discharge Plan:   Diet Plan: Discussed  Activity Level: Discussed  Confirmed Follow up Appointment: No (Comments)  Confirmed Symptoms Management: Discussed  Medication Reconciliation Updated: Yes  Pneumococcal Vaccine Administered/Refused: Not given - Patient refused pneumococcal vaccine  Influenza Vaccine Indication: Patient Refuses  Influenza Vaccine Given - only chart on this line when given:  (Refused influenza information )    I understand that a diet low in cholesterol, fat, and sodium is recommended for good health. Unless I have been given specific instructions below for another diet, I accept this instruction as my diet prescription.   Other diet: Regular as tolerated    Special Instructions: None    · Is patient discharged on Warfarin / Coumadin?   No     Hospice  Hospice is a service that is designed to provide people who are terminally ill and their families with medical, spiritual, and psychological support. Its aim is to improve your quality of life by keeping you as alert and comfortable as possible. Hospice is performed by a team of health care professionals and volunteers who:  · Help keep you comfortable. Hospice can be provided in your home or in a homelike setting. The hospice staff works with your family and friends to help meet your needs. You will enjoy the support of loved ones by receiving much of your basic care from family and friends.  · Provide pain relief and manage your symptoms. The staff supply all necessary medicines and equipment.  · Provide companionship when you are alone.  · Allow you and your family to rest. They may do light housekeeping, prepare meals, and run errands.  · Provide counseling. They will  make sure your emotional, spiritual, and social needs and those of your family are being met.  · Provide spiritual care. Spiritual care is individualized to meet your needs and your family's needs. It may involve helping you look at what death means to you, say goodbye, or perform a specific Jewish ceremony or ritual.  Hospice teams often include:  · A nurse.  · A doctor.  · Social workers.  · Confucianism leaders (such as a ).  · Trained volunteers.  WHEN SHOULD HOSPICE CARE BEGIN?  Most people who use hospice are believed to have fewer than 6 months to live. Your family and health care providers can help you decide when hospice services should begin. If your condition improves, you may discontinue the program.  WHAT SHOULD I CONSIDER BEFORE SELECTING A PROGRAM?  Most hospice programs are run by nonprofit, independent organizations. Some are affiliated with hospitals, nursing homes, or home health care agencies. Hospice programs can take place in the home or at a hospice center, hospital, or skilled nursing facility. When choosing a hospice program, ask the following questions:  · What services are available to me?  · What services are offered to my loved ones?  · How involved are my loved ones?  · How involved is my health care provider?  · Who makes up the hospice care team? How are they trained or screened?  · How will my pain and symptoms be managed?  · If my circumstances change, can the services be provided in a different setting, such as my home or in the hospital?  · Is the program reviewed and licensed by the state or certified in some other way?  WHERE CAN I LEARN MORE ABOUT HOSPICE?  You can learn about existing hospice programs in your area from your health care providers. You can also read more about hospice online. The websites of the following organizations contain helpful information:  · The National Hospice and Palliative Care Organization (NHPCO).  · The Hospice Association of [place]  (GERARDO).  · The Hospice Education [place].  · The American Cancer Society (ACS).  · Hospice Net.  This information is not intended to replace advice given to you by your health care provider. Make sure you discuss any questions you have with your health care provider.  Document Released: 04/05/2005 Document Revised: 12/23/2014 Document Reviewed: 10/28/2014  [employer] Interactive Patient Education © 2017 [employer]        Depression / Suicide Risk    As you are discharged from this [facility] facility, it is important to learn how to keep safe from harming yourself.    Recognize the warning signs:  · Abrupt changes in personality, positive or negative- including increase in energy   · Giving away possessions  · Change in eating patterns- significant weight changes-  positive or negative  · Change in sleeping patterns- unable to sleep or sleeping all the time   · Unwillingness or inability to communicate  · Depression  · Unusual sadness, discouragement and loneliness  · Talk of wanting to die  · Neglect of personal appearance   · Rebelliousness- reckless behavior  · Withdrawal from people/activities they love  · Confusion- inability to concentrate     If you or a loved one observes any of these behaviors or has concerns about self-harm, here's what you can do:  · Talk about it- your feelings and reasons for harming yourself  · Remove any means that you might use to hurt yourself (examples: pills, rope, extension cords, firearm)  · Get professional help from the community (Mental Health, Substance Abuse, psychological counseling)  · Do not be alone:Call your Safe Contact- someone whom you trust who will be there for you.  · Call your local CRISIS HOTLINE 518-7728 or 379-499-1624  · Call your local Children's Mobile Crisis Response Team [place] (178) 549-4249 or www.[employer]  · Call the toll free National Suicide Prevention Hotlines   · National Suicide Prevention Lifeline 029-725-VQAQ  (3105)  · [facility] 800-SUICIDE (780-9341)

## 2018-05-01 NOTE — PROGRESS NOTES
Discharge to home with home hospice with [facility] . Pt signed a copy of the discharge papers and confirms all questions have been answered by the MD or the RN. Second copy of d/c papers in chart. All prescriptions transferred to home with home hospice. IV discontinued. Pt states all person belongings are in possession. [facility] without incident. Pt room has been stripped and is ready to be cleaned. No belongings left behind.

## 2018-05-01 NOTE — PROGRESS NOTES
Pt resting comfortably in bed. Family at bedside. Pt declines pain. Pt and family updated with POC to discharge with hospice.

## 2018-05-01 NOTE — PROGRESS NOTES
While assessing the patient, it was explained to her and her family member Jany that she has morphine for pain, ativan for anxiety, and pepcid for heartburn every hour if she needs it. She was instructed and demonstrated understanding that she should either ask me for these when I round on her every hour, or that she can press her call light to notify me of her need for any of these medications. Because her bottom is pink and slow to ryan, I asked her if she would still like to be turned every hour. She requested that we let her sleep and not be turned for the time being.

## 2018-05-01 NOTE — CARE PLAN
Problem: Skin Integrity  Goal: Skin Integrity is maintained  Outcome: PROGRESSING AS EXPECTED  Patient states she would not like to be turned while she is sleeping    Problem: Pain  Goal: Alleviation of Pain or a reduction in pain to the patient's comfort goal  Outcome: PROGRESSING AS EXPECTED  Patient understands she has medications available to alleviate any pain. She states she does not need anything for pain so far and has been able to sleep a majority of this shift

## 2018-08-16 DIAGNOSIS — I48.91 ATRIAL FIBRILLATION, UNSPECIFIED TYPE (HCC): ICD-10-CM

## 2018-08-23 NOTE — CARE PLAN
Date of Service: 08/22/2018    ORTHOPEDIC CLINIC NOTE-Isabela    The patient presents today for evaluation of a new issue.  She had right hip and groin pain that started fairly acutely in June.  She denies any true radicular symptoms.  Over the last few weeks her pain is really gone.  She tried Tylenol and Ibuprofen.  Denies any injury.    PHYSICAL EXAMINATION:  Examination today of her gait is normal.  Negative log roll.  Calves are soft.  Homans sign is negative.  Motion of her right hip is from 0-100 degrees of flexion, internal rotation to 20 degrees, external rotation to 40 degrees. Her greater trochanter is nontender.  Knees and ankles are nontender.      AP pelvis and a lateral x-ray of her right hip was reviewed from 07/26/2018.  She has mild arthritis of both hips.    ASSESSMENT AND PLAN:  Right hip pain, now resolved. At this point, I explained to the patient I think her pain is likely an arthritic flare of her right hip.  She may have had some trochanter bursitis; however, her pain is 99% gone per her report and her exam is completely benign.  I recommend observation only.  Otherwise, I will see her back if her pain recurs.      Dictated By: Fabrice Allred MD  Signing Provider: Fabrice Allred MD    [id number] ([id number])  DD: 08/22/2018 12:22:37 TD: 08/23/2018 11:36:49    Copy Sent To:     cc: Marilyn Rogers MD   Problem: Nutritional:  Goal: Achieve adequate nutritional intake  Patient will consume >50% of meals   Outcome: PROGRESSING SLOWER THAN EXPECTED    -Per chart review of ADL's, this pt has been consuming 25-50% of meals. Pt was NPO yesterday for Liver biopsy.  -Pt remains very confused. Note pt + for BM last night.  -She is receiving Boost Glucose Control TID with meals. Please continue to encourage PO intake.  -It pt's mentation does not improve and PO remains poor, consider alternate source of nutrition.    RD following.

## 2018-10-04 LAB — EKG IMPRESSION: NORMAL
